# Patient Record
Sex: FEMALE | Race: BLACK OR AFRICAN AMERICAN | NOT HISPANIC OR LATINO | Employment: OTHER | URBAN - METROPOLITAN AREA
[De-identification: names, ages, dates, MRNs, and addresses within clinical notes are randomized per-mention and may not be internally consistent; named-entity substitution may affect disease eponyms.]

---

## 2017-01-03 ENCOUNTER — TELEPHONE (OUTPATIENT)
Dept: ENDOCRINOLOGY | Facility: CLINIC | Age: 67
End: 2017-01-03

## 2017-01-03 DIAGNOSIS — E05.90 HYPERTHYROIDISM: Primary | ICD-10-CM

## 2017-01-03 RX ORDER — METHIMAZOLE 10 MG/1
10 TABLET ORAL DAILY
Qty: 30 TABLET | Refills: 11 | Status: SHIPPED | OUTPATIENT
Start: 2017-01-03 | End: 2017-08-08 | Stop reason: DRUGHIGH

## 2017-01-03 NOTE — TELEPHONE ENCOUNTER
----- Message from Venu Jacob II, MD sent at 1/3/2017  3:59 PM CST -----  Contact: Robin Stein: [phone]  You saw her last week.  ----- Message -----     From: Paz Angelo LPN     Sent: 2016   3:51 PM       To: Venu Jacob II, MD    Patient has not seen you since last year in November and the medication she is requesting for refill has  so I did not want to call in the medication because did not know what dose she should be on also patient is requesting a call back from the doctor with lab results please advise   ----- Message -----     From: Lelia Higgins     Sent: 2016   2:39 PM       To: [facility] Staff    Patient is needing a refill on Methimazole.  Patient has 1 left.    Pharmacy used is [facility] [address] [phone] (Phone) [phone] (Fax).    Please call in.  Patient can be reached at [phone], patient would also like to speak with Dr. Jacob, concerns over lab work that was seen, patient stated that no one has called her.  Thanks

## 2017-01-03 NOTE — TELEPHONE ENCOUNTER
Called patient regarding labs/medication refill. Dr. Jacob left message for patient on 12/21/16 to decrease Methimazole to 10 mg daily. This was reinforced at her visit with me on 12/29/16. Refill for Methimazole sent to pharmacy per her request. Message left for her to return my call. She did not answer.

## 2017-01-04 ENCOUNTER — TELEPHONE (OUTPATIENT)
Dept: INTERNAL MEDICINE | Facility: CLINIC | Age: 67
End: 2017-01-04

## 2017-01-04 NOTE — TELEPHONE ENCOUNTER
Left message for pt requested call back regarding medicaiton concerns restarting asa 81 mg message to Dr. Sonja Wells who performed biopsy to advise if ok to restart

## 2017-01-04 NOTE — TELEPHONE ENCOUNTER
----- Message from Brissa Reese sent at 1/4/2017  8:34 AM CST -----  Contact: Self   X  1st Request  _  2nd Request  _  3rd Request        Who: LICHA CHENEY [[id number]]    Why: Pt is calling to find out when can she begin taking her low dose of Asprin 81 mg.  Pt says that she stopped taking due to a biopsy on 12/23/16. Please contact pt to further discuss and advise.    What Number to Call Back: [phone]    When to Expect a call back: (Before the end of the day)   -- if call after 3:00 call back will be tomorrow.

## 2017-01-05 NOTE — TELEPHONE ENCOUNTER
Pt has been informed of advice of ok to resume ASA per PCP but needs to await the ok from Dr. Wells's office. Pt states verbal understanding and Patient has no further questions or concerns.

## 2017-01-05 NOTE — TELEPHONE ENCOUNTER
----- Message from Shelby Powers sent at 1/4/2017  4:17 PM CST -----  Contact: pt  _  1st Request  _  2nd Request  _  3rd Request        Who: pt    Why: pt returned nurse's phone call     What Number to Call Back:[phone]    When to Expect a call back: (Before the end of the day)   -- if call after 3:00 call back will be tomorrow.

## 2017-01-25 ENCOUNTER — LAB VISIT (OUTPATIENT)
Dept: LAB | Facility: HOSPITAL | Age: 67
End: 2017-01-25
Attending: INTERNAL MEDICINE
Payer: MEDICARE

## 2017-01-25 DIAGNOSIS — E05.90 HYPERTHYROIDISM: ICD-10-CM

## 2017-01-25 LAB
T4 FREE SERPL-MCNC: 0.77 NG/DL
TSH SERPL DL<=0.005 MIU/L-ACNC: 7.26 UIU/ML

## 2017-01-25 PROCEDURE — 36415 COLL VENOUS BLD VENIPUNCTURE: CPT

## 2017-01-25 PROCEDURE — 84439 ASSAY OF FREE THYROXINE: CPT

## 2017-01-25 PROCEDURE — 84443 ASSAY THYROID STIM HORMONE: CPT

## 2017-02-17 RX ORDER — ENALAPRIL MALEATE 20 MG/1
TABLET ORAL
Qty: 90 TABLET | Refills: 3 | Status: SHIPPED | OUTPATIENT
Start: 2017-02-17 | End: 2018-02-12 | Stop reason: SDUPTHER

## 2017-03-23 ENCOUNTER — LAB VISIT (OUTPATIENT)
Dept: LAB | Facility: OTHER | Age: 67
End: 2017-03-23
Attending: NURSE PRACTITIONER
Payer: MEDICARE

## 2017-03-23 DIAGNOSIS — E10.42 TYPE 1 DIABETES MELLITUS WITH DIABETIC POLYNEUROPATHY: ICD-10-CM

## 2017-03-23 PROCEDURE — 83036 HEMOGLOBIN GLYCOSYLATED A1C: CPT

## 2017-03-23 PROCEDURE — 36415 COLL VENOUS BLD VENIPUNCTURE: CPT

## 2017-03-23 PROCEDURE — 82652 VIT D 1 25-DIHYDROXY: CPT

## 2017-03-24 LAB
ESTIMATED AVG GLUCOSE: 217 MG/DL
HBA1C MFR BLD HPLC: 9.2 %

## 2017-03-27 LAB — 1,25(OH)2D3 SERPL-MCNC: 53 PG/ML

## 2017-03-28 ENCOUNTER — TELEPHONE (OUTPATIENT)
Dept: ENDOCRINOLOGY | Facility: CLINIC | Age: 67
End: 2017-03-28

## 2017-03-28 ENCOUNTER — OFFICE VISIT (OUTPATIENT)
Dept: ENDOCRINOLOGY | Facility: CLINIC | Age: 67
End: 2017-03-28
Payer: MEDICARE

## 2017-03-28 VITALS
HEART RATE: 62 BPM | HEIGHT: 64 IN | WEIGHT: 116 LBS | SYSTOLIC BLOOD PRESSURE: 120 MMHG | DIASTOLIC BLOOD PRESSURE: 76 MMHG | BODY MASS INDEX: 19.81 KG/M2

## 2017-03-28 DIAGNOSIS — M81.0 OSTEOPOROSIS, POST-MENOPAUSAL: Chronic | ICD-10-CM

## 2017-03-28 DIAGNOSIS — E78.2 MIXED HYPERLIPIDEMIA: Chronic | ICD-10-CM

## 2017-03-28 DIAGNOSIS — E10.40 TYPE 1 DIABETES MELLITUS WITH DIABETIC NEUROPATHY: ICD-10-CM

## 2017-03-28 DIAGNOSIS — I10 ESSENTIAL HYPERTENSION: Chronic | ICD-10-CM

## 2017-03-28 DIAGNOSIS — E05.90 HYPERTHYROIDISM: ICD-10-CM

## 2017-03-28 DIAGNOSIS — E10.42 TYPE 1 DIABETES MELLITUS WITH DIABETIC POLYNEUROPATHY: Primary | ICD-10-CM

## 2017-03-28 PROCEDURE — 99999 PR PBB SHADOW E&M-EST. PATIENT-LVL III: CPT | Mod: PBBFAC,,, | Performed by: NURSE PRACTITIONER

## 2017-03-28 PROCEDURE — 1157F ADVNC CARE PLAN IN RCRD: CPT | Mod: S$GLB,,, | Performed by: NURSE PRACTITIONER

## 2017-03-28 PROCEDURE — 3078F DIAST BP <80 MM HG: CPT | Mod: S$GLB,,, | Performed by: NURSE PRACTITIONER

## 2017-03-28 PROCEDURE — 4010F ACE/ARB THERAPY RXD/TAKEN: CPT | Mod: S$GLB,,, | Performed by: NURSE PRACTITIONER

## 2017-03-28 PROCEDURE — 3046F HEMOGLOBIN A1C LEVEL >9.0%: CPT | Mod: S$GLB,,, | Performed by: NURSE PRACTITIONER

## 2017-03-28 PROCEDURE — 1159F MED LIST DOCD IN RCRD: CPT | Mod: S$GLB,,, | Performed by: NURSE PRACTITIONER

## 2017-03-28 PROCEDURE — 99214 OFFICE O/P EST MOD 30 MIN: CPT | Mod: S$GLB,,, | Performed by: NURSE PRACTITIONER

## 2017-03-28 PROCEDURE — 1160F RVW MEDS BY RX/DR IN RCRD: CPT | Mod: S$GLB,,, | Performed by: NURSE PRACTITIONER

## 2017-03-28 PROCEDURE — 1126F AMNT PAIN NOTED NONE PRSNT: CPT | Mod: S$GLB,,, | Performed by: NURSE PRACTITIONER

## 2017-03-28 PROCEDURE — 3074F SYST BP LT 130 MM HG: CPT | Mod: S$GLB,,, | Performed by: NURSE PRACTITIONER

## 2017-03-28 RX ORDER — PANTOPRAZOLE SODIUM 40 MG/1
40 TABLET, DELAYED RELEASE ORAL DAILY PRN
COMMUNITY
Start: 2017-02-26 | End: 2018-04-05 | Stop reason: SDUPTHER

## 2017-03-28 RX ORDER — INSULIN GLARGINE 100 [IU]/ML
20 INJECTION, SOLUTION SUBCUTANEOUS DAILY
Qty: 2 BOX | Refills: 3
Start: 2017-03-28 | End: 2017-10-24 | Stop reason: SDUPTHER

## 2017-03-28 NOTE — PROGRESS NOTES
CC: The primary encounter diagnosis was Type 1 diabetes mellitus with diabetic polyneuropathy. Diagnoses of Essential hypertension, Mixed hyperlipidemia, Hyperthyroidism, Osteoporosis, post-menopausal, and Type 1 diabetes mellitus with diabetic neuropathy were also pertinent to this visit.     HPI: Ms. Sarita Alvarez is a 67 y.o. Black or  female who was diagnosed with gestational DM at age 42. In 1997, she was diagnosed with T2DM, however workup revealed NILAY.     Last seen in clinic in December 2016. She had a breast biopsy in December 2016, which was benign.   Her house was affected by the [place] in [facility] this year. She is currently staying with family while her house is repaired.      Arrives alone. She brought her Relion Meter with her today. The date and time are incorrect in the meter. BG is 254 today in clinic.   Noted variability in BG readings low of 74 and high of 432. BG mostly in the 160's-200's.   14 day average- 194  30 day average-200    Monitoring readings frequently, she reports 8-10x/day. She reports that she is concerned about her BG reading being high or low. She doesn't eat anything unless her BG is <180. She reports if her BG is in the 140-150 's she tries to eat bc otherwise her BG will start to drop.    FBG- ~100-160's      Counts carbs, but does not also follow the carb ratios prescribed. She is taking 3-5 units of Novolog with meals. She reports that if she takes the Novolog prescribed with the CHO ratio, her BG readings will drop too fast because she is too active. She will only take the Novolog with meals if her BG is >150.     No interest in insulin pumps. She may be interested in a personal CGM- such as a Dexcom.      No exercise.     She admits to dietary indiscretions. Eats 3 meals per day. Denies snacking. Drinks coffee in the AM with 1 sugar packet and creamer.     CURRENT DIABETIC MEDS: Lantus 20 units QAM, Novolog 1:13 I:C ratio (3-5 units with  "Novolog), goal 100, ISF 45  Uses Vials or Pens: Pens  Type of Glucose Meter: Relion Meter    Last Eye Exam: April 2016  Last Podiatry Exam: None    REVIEW OF SYSTEMS  General: no weight changes, fatigue, or fever.   Eyes: denies blurry vision, eye pain, or redness.  Cardiac: no palpitations or chest pain.   Respiratory: no cough or dyspnea.  GI: good appetite, no nausea or abdominal pain.   Skin: no rashes; occasional bruising at insulin injection sites.   Neuro: no numbness, dizziness, or tingling.    Vital Signs  /76  Pulse 62  Ht 5' 4" (1.626 m)  Wt 52.6 kg (116 lb)  BMI 19.91 kg/m2    Hemoglobin A1C   Date Value Ref Range Status   03/23/2017 9.2 (H) 4.5 - 6.2 % Final     Comment:     According to ADA guidelines, hemoglobin A1C <7.0% represents  optimal control in non-pregnant diabetic patients.  Different  metrics may apply to specific populations.   Standards of Medical Care in Diabetes - 2016.  For the purpose of screening for the presence of diabetes:  <5.7%     Consistent with the absence of diabetes  5.7-6.4%  Consistent with increasing risk for diabetes   (prediabetes)  >or=6.5%  Consistent with diabetes  Currently no consensus exists for use of hemoglobin A1C  for diagnosis of diabetes for children.     12/21/2016 9.1 (H) 4.5 - 6.2 % Final     Comment:     According to ADA guidelines, hemoglobin A1C <7.0% represents  optimal control in non-pregnant diabetic patients.  Different  metrics may apply to specific populations.   Standards of Medical Care in Diabetes - 2016.  For the purpose of screening for the presence of diabetes:  <5.7%     Consistent with the absence of diabetes  5.7-6.4%  Consistent with increasing risk for diabetes   (prediabetes)  >or=6.5%  Consistent with diabetes  Currently no consensus exists for use of hemoglobin A1C  for diagnosis of diabetes for children.     09/22/2016 9.4 (H) 4.5 - 6.2 % Final     Comment:     According to ADA guidelines, hemoglobin A1C <7.0% " represents  optimal control in non-pregnant diabetic patients.  Different  metrics may apply to specific populations.   Standards of Medical Care in Diabetes - 2016.  For the purpose of screening for the presence of diabetes:  <5.7%     Consistent with the absence of diabetes  5.7-6.4%  Consistent with increasing risk for diabetes   (prediabetes)  >or=6.5%  Consistent with diabetes  Currently no consensus exists for use of hemoglobin A1C  for diagnosis of diabetes for children.       Lab Results   Component Value Date    CREATININE 1.2 12/21/2016      Lab Results   Component Value Date    LDLCALC 86.6 12/21/2016      Lab Results   Component Value Date    TSH 7.256 (H) 01/25/2017     Lab Results   Component Value Date    MICALBCREAT 4.3 09/29/2016     Component      Latest Ref Rng 9/28/2015   Vit D, 1,25-Dihydroxy      20-79 pg/mL 21     PHYSICAL EXAMINATION  Constitutional: Appears well, no distress  Neck: Supple, trachea midline.   Respiratory: CTA, even and unlabored.  Cardiovascular: RRR, S1 and S2 with no murmurs or extra sounds; no carotid bruits.  Lymph: DP pulses  2+ bilaterally; no edema.   Skin: warm and dry; no rash or insulin reactions observed  Diabetes Foot Exam: Appropriate footwear.     Assessment/Plan  1. Diabetic peripheral neuropathy associated with type 1 diabetes mellitus:     Uncontrolled. A1c above goal.     Continue current insulin doses as documented above. Instructed to continue to take Novolog in tandem with meals and avoid waiting until her BG are high to take insulin. Encouraged to CHO count with meals and snacking and administer inuslin based on the ICR instead of set doses of 2-5 units.     Insulin safety tips reviewed.  Skip meal insulin if meal is skipped.  Do not take meal insulin any more often than every 4 hours.  Take basal insulin at the same time every day - do not increase or decrease doses based on current blood glucose reading. Use Rule of 15 for treatment of hypoglycemia.   Maintain blood glucose logs to look for patterns and make insulin adjustments.  Lower insulin dose prior to exercise or snack prior to exercise. Call between visits if BG has been running high or low on several occasions with no known reason.      Information provided on Dexcom- believe she would benefit from a personal CGMS as she is checking her BG 8-10 x/day and waking up at 0200 to check. She is afraid of hypoglycemia and will not always administer her Novolog with meals due to this fear.     Monitor readings at least 4x/day and document. Time and date changed in meter.     DM education for CHO refresher and MNT.      Reviewed DM diet, low CHO snacks, and exercise.      2. Essential hypertension: goal <140/90. BP at goal. Continue Amlodipine, Enalapril, HCTZ, and Metoprolol      3.  Hyperlipidemia: LDL goal <100. LDL at goal. Continue Simvastatin. LFTs WNL.      4.  Hyperthyroidism: Continue Tapazole, which has been decreased to 10 mg daily. Managed by Dr. Jacob, last seen in November 2015. F/u with staff for RTC with TSH.       5.  Osteoporosis: On calcium and Vitamin D supplement.        FOLLOW UP  Return in about 3 months with staff for hyperthyroidism.         Orders Placed This Encounter   Procedures    Hemoglobin A1c     Standing Status:   Future     Standing Expiration Date:   5/27/2018    TSH     Standing Status:   Future     Standing Expiration Date:   5/27/2018    Ambulatory Referral to Diabetes Education     Referral Priority:   Routine     Referral Type:   Consultation     Referral Reason:   Specialty Services Required     Requested Specialty:   Diabetes     Number of Visits Requested:   1     Pt seen in conjunction with XUAN Krueger

## 2017-03-28 NOTE — PATIENT INSTRUCTIONS
Snacks can be an important part of a balanced, healthy meal plan. They allow you to eat more frequently, feeling full and satisfied throughout the day. Also, they allow you to spread carbohydrates evenly, which may stabilize blood sugars.  Plus, snacks are enjoyable!     The amount of carbohydrate needed at snacks varies. Generally, about 15-30 grams of carbohydrate per snack is recommended.  Below you will find some tasty treats.       0-5 gm carb   Crystal Light   Vitamin Water Zero   Herbal tea, unsweetened   2 tsp peanut butter on celery   1./2 cup sugar-free jell-o   1 sugar-free popsicle   ¼ cup blueberries   8oz Blue T[place]ka unsweetened almond milk   5 baby carrots & celery sticks, cucumbers, bell peppers dipped in ¼ cup salsa, 2Tbsp light ranch dressing or 2Tbsp plain Greek yogurt   10 Goldfish crackers   ½ oz low-fat cheese or string cheese   1 closed handful of nuts, unsalted   1 Tbsp of sunflower seeds, unsalted   1 cup Smart Pop popcorn   1 whole grain brown rice cake        15 gm carb   1 small piece of fruit or ½ banana or 1/2 cup lite canned fruit   3 orin cracker squares   3 cups Smart Pop popcorn, top spray butter, Farfan lite salt or cinnamon and Truvia   5 Vanilla Wafers   ½ cup low fat, no added sugar ice cream or frozen yogurt (Blue bell, Blue Bunny, Weight Watchers, Skinny Cow)   ½ turkey, ham, or chicken sandwich   ½ c fruit with ½ c Cottage cheese   4-6 unsalted wheat crackers with 1 oz low fat cheese or 1 tbsp peanut butter    30-45 goldfish crackers (depending on flavor)    7-8 Mandaen mini brown rice cakes (caramel, apple cinnamon, chocolate)    12 Mandaen mini brown rice cakes (cheddar, bbq, ranch)    1/3 cup hummus dip with raw veg   1/2 whole wheat katerine, 1Tbsp hummus   Mini Pizza (1/2 whole wheat English muffin, low-fat  cheese, tomato sauce)   100 calorie snack pack (Oreo, Chips Ahoy, Ritz Mix, Baked Cheetos)   4-6 oz. light or Greek Style yogurt  (Chonatyi, Yoplait, Okdianelys, [facility])   ½ cup sugar-free pudding     6 in. wheat tortilla or katerine oven toasted chips (topped with spray butter flavoring, cinnamon, Truvia OR spray butter, garlic powder, chili powder)    18 BBQ Popchips (available at [employer], Whole Foods, Fresh Market)                   Hypoglycemia (Low Blood Sugar)     Fast-acting sugar includes a cup of nonfat milk.     Too little sugar (glucose) in your blood is called hypoglycemia or low blood sugar. Low blood sugar usually means anything lower than 70 mg/dL. Talk with your healthcare provider about your target range and what level is too low for you. Diabetes itself doesnt cause low blood sugar. But some of the treatments for diabetes, such as pills or insulin, may raise your risk for it. Low blood sugar may cause you to pass out or have a seizure. So always treat low blood sugar right away, but don't overeat.  Special note: Always carry a source of fast-acting sugar and a snack in case of hypoglycemia.   What you may notice  If you have low blood sugar, you may have one or more of these symptoms:  · Shakiness or dizziness  · Cold, clammy skin or sweating  · Feelings of hunger  · Headache  · Nervousness  · A hard, fast heartbeat  · Weakness  · Confusion or irritability  · Blurred vision  · Having nightmares or waking up confused or sweating  · Numbness or tingling in the lips or tongue  What you should do  Here are tips to follow if you have hypoglycemia:   · First check your blood sugar. If it is too low (out of your target range), eat or drink 15 to 20 grams of fast-acting sugar. This may be 3 to 4 glucose tablets, 4 ounces (half a cup) of fruit juice or regular (nondiet) soda, 8 ounces (1 cup) of fat-free milk, or 1 tablespoon of honey. Dont take more than this, or your blood sugar may go too high.  · Wait 15 minutes. Then recheck your blood sugar if you can.  · If your blood sugar is still too low, repeat the steps above and check your  blood sugar again. If your blood sugar still has not returned to your target range, contact your healthcare provider or seek emergency care.  · Once your blood sugar returns to target range, eat a snack or meal.  Preventing low blood sugar  Things you can do include the following:   · If your condition needs a strict treatment plan, eat your meals and snacks at the same times each day. Dont skip meals!  · If your treatment plan lets you change when you eat and what you eat, learn how to change the time and dose of your rapid-acting insulin to match this.   · Ask your healthcare provider if it is safe for you to drink alcohol. Never drink on an empty stomach.  · Take your medicine at the prescribed times.  · Always carry a source of fast-acting sugar and a snack when youre away from home.  Other things to do  Additional tips include the following:  · Carry a medical ID card, a compact USB drive, or wear a medical alert bracelet or necklace. It should say that you have diabetes. It should also say what to do if you pass out or have a seizure.  · Make sure your family, friends, and coworkers know the signs of low blood sugar. Tell them what to do if your blood sugar falls very low and you cant treat yourself.  · Keep a glucagon emergency kit handy. Be sure your family, friends, and coworkers know how and when to use it. Check it regularly and replace the glucagon before it expires.  · Talk with your health care team about other things you can do to prevent low blood sugar.     If you have unexplained hypoglycemia or hypoglycemia several times, call your healthcare provider.   Date Last Reviewed: 5/1/2016 © 2000-2016 [employer]. [address] [place] [address], [place] [address]. All rights reserved. This information is not intended as a substitute for professional medical care. Always follow your healthcare professional's instructions.        Understanding Carbohydrates, Fats, and Protein  Food is a source of  fuel and nourishment for your body. Its also a source of pleasure. Having diabetes doesnt mean you have to eat special foods or give up desserts. Instead, your dietitian can show you how to plan meals to suit your body. To start, learn how different foods affect blood sugar.  Carbohydrates  Carbohydrates are the main source of fuel for the body. Carbohydrates raise blood sugar. Many people think carbohydrates are only found in pasta or bread. But carbohydrates are actually in many kinds of foods:  · Sugars occur naturally in foods such as fruit, milk, honey, and molasses. Sugars can also be added to many foods, from cereals and yogurt to candy and desserts. Sugars raise blood sugar.  · Starches are found in bread, cereals, pasta, and dried beans. Theyre also found in corn, peas, potatoes, yam, acorn squash, and butternut squash. Starches also raise blood sugar.   · Fiber is found in foods such as vegetables, fruits, beans, and whole grains. Unlike other carbs, fiber isnt digested or absorbed. So it doesnt raise blood sugar. In fact, fiber can help keep blood sugar from rising too fast. It also helps keep blood cholesterol at a healthy level.  Did you know?  Even though carbohydrates raise blood sugar, its best to have some in every meal. They are an important part of a healthy diet.   Fat  Fat is an energy source that can be stored until needed. Fat does not raise blood sugar. However, it can raise blood cholesterol, increasing the risk of heart disease. Fat is also high in calories, which can cause weight gain. Not all types of fat are the same.  More Healthy:  · Monounsaturated fats are mostly found in vegetable oils, such as olive, canola, and peanut oils. They are also found in avocados and some nuts. Monounsaturated fats are healthy for your heart. Thats because they lower LDL (unhealthy) cholesterol.  · Polyunsaturated fats are mostly found in vegetable oils, such as corn, safflower, and soybean oils.  They are also found in some seeds, nuts, and fish. Polyunsaturated fats lower LDL (unhealthy) cholesterol. So, choosing them instead of saturated fats is healthy for your heart. Certain unsaturated fats can help lower triglycerides.   Less Healthy:  · Saturated fats are found in animal products, such as meat, poultry, whole milk, lard, and butter. Saturated fats raise LDL cholesterol and are not healthy for your heart.  · Hydrogenated oils and trans fats are formed when vegetable oils are processed into solid fats. They are found in many processed foods. Hydrogenated oils and trans fats raise LDL cholesterol and lower HDL (healthy) cholesterol. They are not healthy for your heart.  Protein  Protein helps the body build and repair muscle and other tissue. Protein has little or no effect on blood sugar. However, many foods that contain protein also contain saturated fat. By choosing low-fat protein sources, you can get the benefits of protein without the extra fat:  · Plant protein is found in dry beans and peas, nuts, and soy products, such as tofu and soymilk. These sources tend to be cholesterol-free and low in saturated fat.  · Animal protein is found in fish, poultry, meat, cheese, milk, and eggs. These contain cholesterol and can be high in saturated fat. Aim for lean, lower-fat choices.  Date Last Reviewed: 3/1/2016  © 7234-0530 [employer]. [address] [place] [address], [place] [address]. All rights reserved. This information is not intended as a substitute for professional medical care. Always follow your healthcare professional's instructions.        Diet: Diabetes  Food is an important tool that you can use to control diabetes and stay healthy. Eating well-balanced meals in the correct amounts will help you control your blood glucose levels and prevent low blood sugar reactions. It will also help you reduce the health risks of diabetes. There is no one specific diet that is right for everyone with  diabetes. But there are general guidelines to follow. A registered dietitian (RD) will create a tailored diet approach thats just right for you. He or she will also help you plan healthy meals and snacks. If you have any questions, call your dietitian for advice.    Guidelines for success  Talk with your healthcare provider before starting a diabetes diet or weight loss program. If you haven't talked with a dietitian yet, ask your provider for a referral. The following guidelines can help you succeed:  · Select foods from the 6 food groups below. Your dietitian will help you find food choices within each group. He or she will also show you serving sizes and how many servings you can have at each meal.  ¨ Grains, beans, and starchy vegetables  ¨ Vegetables  ¨ Fruit  ¨ Milk or yogurt  ¨ Meat, poultry, fish, or tofu  ¨ Healthy fats  · Check your blood sugar levels as directed by your provider. Take any medicine as prescribed by your provider.  · Learn to read food labels and pick the right portion sizes.  · Eat only the amount of food in your meal plan. Eat about the same amount of food at regular times each day. Dont skip meals. Eat meals 4 to 5 hours apart, with snacks in between.  · Limit alcohol. It raises blood sugar levels. Drink water or calorie-free diet drinks that use safe sweeteners.  · Eat less fat to help lower your risk of heart disease. Use nonfat or low-fat dairy products and lean meats. Avoid fried foods. Use cooking oils that are unsaturated, such as olive, canola, or peanut oil.  · Talk with your dietitian about safe sugar substitutes.  · Avoid added salt. It can contribute to high blood pressure, which can cause heart disease. People with diabetes already have a risk of high blood pressure and heart disease.  · Stay at a healthy weight. If you need to lose weight, cut down on your portion sizes. But dont skip meals. Exercise is an important part of any weight management program. Talk with your  provider about an exercise program thats right for you.  · For more information about the best diet plan for you, talk with a registered dietitian (RD). To find an RD in your area, contact:  ¨ Academy of Nutrition and Dietetics www.eatright.org  ¨ The American Diabetes Association 012-651-9882 www.diabetes.org  Date Last Reviewed: 8/1/2016  © 0649-8213 [employer]. [address] [place] [address], [place] [address]. All rights reserved. This information is not intended as a substitute for professional medical care. Always follow your healthcare professional's instructions.        Eating Out When You Have Diabetes  Eating right is an important part of keeping your blood sugar in your target range. You just need to make healthy choices.    Tips for restaurant meals  When you eat away from home try these tips:  · Try to schedule your dining-out meal at your normal meal time. Make a reservation if possible, so you don't have to wait to eat. If you can't make a reservation, try to arrive at the restaurant at a less-busy time to cut down your wait time. Eat a small fruit or starch snack at your regular mealtime if your restaurant meal is going to be later than usual.   · Call ahead to see if the restaurant can meet your dietary needs if you've never been there before. Or you can go online to see the menu ahead of time.  · Carry some crackers with you in case the restaurant needs you to wait until you can be served.  · Ask how foods are prepared before you order.  · Instead of fried, sautéed, or breaded foods, choose ones that are broiled, steamed, grilled, or baked.  · Ask for sauces, gravies, and dressings on the side.  · Only eat an amount that fits your meal plan. Remember: You can take home the leftovers.  · Save dessert for special occasions. Then choose a small dessert or share one with a friend or family member.  Make healthy choices  Fast food  · Garden salad with light dressing on the side  · Baked potato  with vegetables or herbs  · Broiled, roasted, or grilled chicken sandwich  · Sliced turkey or lean roast beef sandwich  Mexican  · Chicken enchilada, without cheese or sour cream   · Small burrito with whole beans and chicken  · Whole beans (not refried) and rice  · Chicken or fish fajitas  Steakhouse  · Grilled or broiled lean cuts of beef  · Baked potato with vegetables or herbs  · Broiled or baked chicken. Dont eat the skin.  · Steamed vegetables  Asian  · Steamed dumplings or potstickers  · Broiled, boiled, or steamed meats or fish  · Sushi or sashimi  · Steamed rice or boiled noodles. One serving is equal to 1/3 cup.  Date Last Reviewed: 6/1/2016  © 1961-5435 [email or web address]. [address] [place] [address]. All rights reserved. This information is not intended as a substitute for professional medical care. Always follow your healthcare professional's instructions.

## 2017-04-12 RX ORDER — AMLODIPINE BESYLATE 5 MG/1
5 TABLET ORAL DAILY
Qty: 90 TABLET | Refills: 0 | Status: SHIPPED | OUTPATIENT
Start: 2017-04-12 | End: 2018-01-07 | Stop reason: SDUPTHER

## 2017-04-12 NOTE — TELEPHONE ENCOUNTER
----- Message from Brissa Reese sent at 4/12/2017 10:57 AM CDT -----  Contact: Self  X  1st Request  _  2nd Request  _  3rd Request    Please refill the medication(s) listed below. Please call the patient when the prescription(s) is ready for  at the phone number ([phone]) .    Medication #1 amlodipine (NORVASC) 5 MG tablet    Preferred Pharmacy:  [facility] [address] [place] [address] [address]

## 2017-04-20 ENCOUNTER — OFFICE VISIT (OUTPATIENT)
Dept: INTERNAL MEDICINE | Facility: CLINIC | Age: 67
End: 2017-04-20
Payer: MEDICARE

## 2017-04-20 VITALS
OXYGEN SATURATION: 99 % | BODY MASS INDEX: 20.06 KG/M2 | HEIGHT: 64 IN | WEIGHT: 117.5 LBS | HEART RATE: 61 BPM | DIASTOLIC BLOOD PRESSURE: 55 MMHG | SYSTOLIC BLOOD PRESSURE: 118 MMHG

## 2017-04-20 DIAGNOSIS — I10 ESSENTIAL HYPERTENSION: Chronic | ICD-10-CM

## 2017-04-20 DIAGNOSIS — M81.0 OSTEOPOROSIS, POST-MENOPAUSAL: Chronic | ICD-10-CM

## 2017-04-20 DIAGNOSIS — E06.3 HASHIMOTO'S DISEASE: ICD-10-CM

## 2017-04-20 DIAGNOSIS — I87.2 VENOUS INSUFFICIENCY (CHRONIC) (PERIPHERAL): Chronic | ICD-10-CM

## 2017-04-20 DIAGNOSIS — E05.90 HYPERTHYROIDISM: ICD-10-CM

## 2017-04-20 DIAGNOSIS — E10.42 TYPE 1 DIABETES MELLITUS WITH DIABETIC POLYNEUROPATHY: ICD-10-CM

## 2017-04-20 DIAGNOSIS — E78.2 MIXED HYPERLIPIDEMIA: Chronic | ICD-10-CM

## 2017-04-20 DIAGNOSIS — E10.42 DIABETIC PERIPHERAL NEUROPATHY ASSOCIATED WITH TYPE 1 DIABETES MELLITUS: Primary | ICD-10-CM

## 2017-04-20 DIAGNOSIS — I47.10 SVT (SUPRAVENTRICULAR TACHYCARDIA): ICD-10-CM

## 2017-04-20 PROCEDURE — 99499 UNLISTED E&M SERVICE: CPT | Mod: S$PBB,,, | Performed by: INTERNAL MEDICINE

## 2017-04-20 PROCEDURE — 1126F AMNT PAIN NOTED NONE PRSNT: CPT | Mod: S$GLB,,, | Performed by: INTERNAL MEDICINE

## 2017-04-20 PROCEDURE — 99214 OFFICE O/P EST MOD 30 MIN: CPT | Mod: S$GLB,,, | Performed by: INTERNAL MEDICINE

## 2017-04-20 PROCEDURE — 1160F RVW MEDS BY RX/DR IN RCRD: CPT | Mod: S$GLB,,, | Performed by: INTERNAL MEDICINE

## 2017-04-20 PROCEDURE — 3074F SYST BP LT 130 MM HG: CPT | Mod: S$GLB,,, | Performed by: INTERNAL MEDICINE

## 2017-04-20 PROCEDURE — 3078F DIAST BP <80 MM HG: CPT | Mod: S$GLB,,, | Performed by: INTERNAL MEDICINE

## 2017-04-20 PROCEDURE — 99999 PR PBB SHADOW E&M-EST. PATIENT-LVL III: CPT | Mod: PBBFAC,,, | Performed by: INTERNAL MEDICINE

## 2017-04-20 PROCEDURE — 4010F ACE/ARB THERAPY RXD/TAKEN: CPT | Mod: S$GLB,,, | Performed by: INTERNAL MEDICINE

## 2017-04-20 PROCEDURE — 1159F MED LIST DOCD IN RCRD: CPT | Mod: S$GLB,,, | Performed by: INTERNAL MEDICINE

## 2017-04-20 PROCEDURE — 3046F HEMOGLOBIN A1C LEVEL >9.0%: CPT | Mod: S$GLB,,, | Performed by: INTERNAL MEDICINE

## 2017-04-20 NOTE — PROGRESS NOTES
Subjective:       Patient ID: Sarita Alvarez is a 67 y.o. female.    Chief Complaint: Medication Refill (follow up)    HPI Comments: Pt here for f/u. Pt has DM1 and is followed by endocrinology. She is on lantus 20 units and novolog 3-5 units per carb counting. Her last A1C was 9.2. She has complications of diabetes that includes neuropathy in feet. This is tolerable without meds. No recent lows. Her fasting sugars are 130s-140s and prandial sugars are under 150. Most recent labs reviewed. She reports skipping insulin at times if sugar is on low side. She also waits until after eating to give insulin. We reviewed proper use of insulin. She has DM educ scheduled in 3 wks.     She has varicose veins that cause some swelling at times but manages with compression hose. Has seen vascular in past.     She also sees endocrinology for hyperthyroidism/hashimotos. She is on tapazole and monitored regularly. She is clinically euthyroid.     Pt's BP is well controlled. Tolerating meds well. Pt denies cp/sob/ha/vision or neuro changes. Checking at home and is well controlled.     HLD is tx with simvastatin which she tolerates well.     Pt has osteoporosis. She has not tolerated actonel due to CP in past, presumably esophagitis. She is not willing to try another bisphosphonate either oral or IV. We discussed prolia but she declines. She understands r/b of this. She is engaged in resistance exercises.      Pt had symptomatic SVT s/p ablation and has not had recurrence.      Medication Refill   Pertinent negatives include no abdominal pain, chest pain, fatigue, headaches or numbness.   Hypertension   Pertinent negatives include no chest pain, headaches or shortness of breath.     Review of Systems   Constitutional: Negative for fatigue and unexpected weight change.   Eyes: Negative for visual disturbance.   Respiratory: Negative for shortness of breath.    Cardiovascular: Negative for chest pain.   Gastrointestinal: Negative  for abdominal pain and blood in stool.   Endocrine: Negative for polyuria.   Genitourinary: Negative for frequency.   Neurological: Negative for dizziness, light-headedness, numbness and headaches.   Psychiatric/Behavioral: Negative for dysphoric mood.       Objective:          Assessment:       1. Diabetic peripheral neuropathy associated with type 1 diabetes mellitus    2. Type 1 diabetes mellitus with diabetic polyneuropathy    3. Essential hypertension    4. SVT (supraventricular tachycardia)    5. Venous insufficiency (chronic) (peripheral)    6. Hashimoto's disease    7. Hyperthyroidism    8. Mixed hyperlipidemia    9. Osteoporosis, post-menopausal        Plan:       1. Reviewed recent labs    2. Keep f/u with endocrinology and DM educ; keep close log of sugars  3. Pt to consider reclast or prolia for osteoporosis  4. Keep f/u with specialists  5. Optometry referral          Physical Exam   Constitutional: She is oriented to person, place, and time. She appears well-developed and well-nourished.   HENT:   Head: Normocephalic and atraumatic.   Mouth/Throat: No oropharyngeal exudate or posterior oropharyngeal erythema.   Eyes: EOM are normal. Pupils are equal, round, and reactive to light.   Neck: Neck supple. Carotid bruit is not present. No thyroid mass and no thyromegaly present.   Cardiovascular: Normal rate, regular rhythm, S1 normal, S2 normal and normal heart sounds.    No murmur heard.  Pulses:       Radial pulses are 2+ on the right side, and 2+ on the left side.   Pulmonary/Chest: Effort normal and breath sounds normal. She has no wheezes.   Abdominal: Soft. Bowel sounds are normal. She exhibits no distension and no mass. There is no hepatosplenomegaly. There is no tenderness.   Musculoskeletal: She exhibits no edema.        Right foot: Normal.        Left foot: Normal.   Lymphadenopathy:     She has no cervical adenopathy.   Neurological: She is alert and oriented to person, place, and time. No  cranial nerve deficit.   Psychiatric: She has a normal mood and affect. Her behavior is normal.

## 2017-05-02 RX ORDER — HYDROCHLOROTHIAZIDE 25 MG/1
TABLET ORAL
Qty: 90 TABLET | Refills: 3 | Status: SHIPPED | OUTPATIENT
Start: 2017-05-02 | End: 2018-04-28 | Stop reason: SDUPTHER

## 2017-05-09 ENCOUNTER — CLINICAL SUPPORT (OUTPATIENT)
Dept: DIABETES | Facility: CLINIC | Age: 67
End: 2017-05-09
Payer: MEDICARE

## 2017-05-09 DIAGNOSIS — E10.42 TYPE 1 DIABETES MELLITUS WITH DIABETIC POLYNEUROPATHY: ICD-10-CM

## 2017-05-09 PROCEDURE — G0108 DIAB MANAGE TRN  PER INDIV: HCPCS | Mod: S$GLB,,, | Performed by: DIETITIAN, REGISTERED

## 2017-05-09 NOTE — Clinical Note
See chart note for full details, but basically I gave her an ICR of 1:100 to use when she will be active following a meal because that is basically what she is doing by just not giving as much as she is calculating. If this is not okay and you want me to contact the pt I will.
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
## 2017-07-31 RX ORDER — FLUTICASONE PROPIONATE 50 MCG
SPRAY, SUSPENSION (ML) NASAL
Qty: 16 G | Refills: 1 | Status: SHIPPED | OUTPATIENT
Start: 2017-07-31 | End: 2019-02-12 | Stop reason: SDUPTHER

## 2017-08-03 ENCOUNTER — TELEPHONE (OUTPATIENT)
Dept: INTERNAL MEDICINE | Facility: CLINIC | Age: 67
End: 2017-08-03

## 2017-08-04 ENCOUNTER — LAB VISIT (OUTPATIENT)
Dept: LAB | Facility: OTHER | Age: 67
End: 2017-08-04
Attending: INTERNAL MEDICINE
Payer: MEDICARE

## 2017-08-04 ENCOUNTER — OFFICE VISIT (OUTPATIENT)
Dept: INTERNAL MEDICINE | Facility: CLINIC | Age: 67
End: 2017-08-04
Payer: MEDICARE

## 2017-08-04 VITALS
WEIGHT: 117.31 LBS | BODY MASS INDEX: 20.03 KG/M2 | OXYGEN SATURATION: 97 % | SYSTOLIC BLOOD PRESSURE: 110 MMHG | HEIGHT: 64 IN | HEART RATE: 55 BPM | DIASTOLIC BLOOD PRESSURE: 70 MMHG

## 2017-08-04 DIAGNOSIS — I47.10 SVT (SUPRAVENTRICULAR TACHYCARDIA): ICD-10-CM

## 2017-08-04 DIAGNOSIS — E10.42 TYPE 1 DIABETES MELLITUS WITH DIABETIC POLYNEUROPATHY: ICD-10-CM

## 2017-08-04 DIAGNOSIS — E10.42 DIABETIC PERIPHERAL NEUROPATHY ASSOCIATED WITH TYPE 1 DIABETES MELLITUS: ICD-10-CM

## 2017-08-04 DIAGNOSIS — I10 ESSENTIAL HYPERTENSION: Primary | Chronic | ICD-10-CM

## 2017-08-04 DIAGNOSIS — E05.90 HYPERTHYROIDISM: ICD-10-CM

## 2017-08-04 DIAGNOSIS — E06.3 HASHIMOTO'S DISEASE: ICD-10-CM

## 2017-08-04 DIAGNOSIS — E78.2 MIXED HYPERLIPIDEMIA: Chronic | ICD-10-CM

## 2017-08-04 DIAGNOSIS — I10 ESSENTIAL HYPERTENSION: Chronic | ICD-10-CM

## 2017-08-04 DIAGNOSIS — I87.2 VENOUS INSUFFICIENCY (CHRONIC) (PERIPHERAL): Chronic | ICD-10-CM

## 2017-08-04 LAB
ALBUMIN SERPL BCP-MCNC: 3.8 G/DL
ALP SERPL-CCNC: 77 U/L
ALT SERPL W/O P-5'-P-CCNC: 22 U/L
ANION GAP SERPL CALC-SCNC: 10 MMOL/L
AST SERPL-CCNC: 21 U/L
BILIRUB SERPL-MCNC: 0.3 MG/DL
BUN SERPL-MCNC: 19 MG/DL
CALCIUM SERPL-MCNC: 9.8 MG/DL
CHLORIDE SERPL-SCNC: 101 MMOL/L
CO2 SERPL-SCNC: 27 MMOL/L
CREAT SERPL-MCNC: 1.1 MG/DL
EST. GFR  (AFRICAN AMERICAN): >60 ML/MIN/1.73 M^2
EST. GFR  (NON AFRICAN AMERICAN): 52 ML/MIN/1.73 M^2
ESTIMATED AVG GLUCOSE: 186 MG/DL
GLUCOSE SERPL-MCNC: 242 MG/DL
HBA1C MFR BLD HPLC: 8.1 %
POTASSIUM SERPL-SCNC: 3.7 MMOL/L
PROT SERPL-MCNC: 7.3 G/DL
SODIUM SERPL-SCNC: 138 MMOL/L
T4 FREE SERPL-MCNC: 0.46 NG/DL
TSH SERPL DL<=0.005 MIU/L-ACNC: 14.58 UIU/ML

## 2017-08-04 PROCEDURE — 1159F MED LIST DOCD IN RCRD: CPT | Mod: S$GLB,,, | Performed by: INTERNAL MEDICINE

## 2017-08-04 PROCEDURE — 84439 ASSAY OF FREE THYROXINE: CPT

## 2017-08-04 PROCEDURE — 90670 PCV13 VACCINE IM: CPT | Mod: S$GLB,,, | Performed by: INTERNAL MEDICINE

## 2017-08-04 PROCEDURE — 83036 HEMOGLOBIN GLYCOSYLATED A1C: CPT

## 2017-08-04 PROCEDURE — 84443 ASSAY THYROID STIM HORMONE: CPT

## 2017-08-04 PROCEDURE — 99214 OFFICE O/P EST MOD 30 MIN: CPT | Mod: 25,S$GLB,, | Performed by: INTERNAL MEDICINE

## 2017-08-04 PROCEDURE — 99499 UNLISTED E&M SERVICE: CPT | Mod: S$PBB,,, | Performed by: INTERNAL MEDICINE

## 2017-08-04 PROCEDURE — 4010F ACE/ARB THERAPY RXD/TAKEN: CPT | Mod: S$GLB,,, | Performed by: INTERNAL MEDICINE

## 2017-08-04 PROCEDURE — 80053 COMPREHEN METABOLIC PANEL: CPT

## 2017-08-04 PROCEDURE — 36415 COLL VENOUS BLD VENIPUNCTURE: CPT

## 2017-08-04 PROCEDURE — 99999 PR PBB SHADOW E&M-EST. PATIENT-LVL III: CPT | Mod: PBBFAC,,, | Performed by: INTERNAL MEDICINE

## 2017-08-04 PROCEDURE — G0009 ADMIN PNEUMOCOCCAL VACCINE: HCPCS | Mod: S$GLB,,, | Performed by: INTERNAL MEDICINE

## 2017-08-04 PROCEDURE — 1126F AMNT PAIN NOTED NONE PRSNT: CPT | Mod: S$GLB,,, | Performed by: INTERNAL MEDICINE

## 2017-08-04 PROCEDURE — 3046F HEMOGLOBIN A1C LEVEL >9.0%: CPT | Mod: S$GLB,,, | Performed by: INTERNAL MEDICINE

## 2017-08-04 NOTE — PROGRESS NOTES
Patient given Prevnar 13 IM in the LD. Patient tolerated well and Band-Aid was applied. Lot#[id number] Exp:5/2018. Patient advised to wait in the lobby for 15 min to make sure no adverse reactions occur. Patient states verbal understanding and has no further questions. Patient given VIS information sheet.

## 2017-08-04 NOTE — PROGRESS NOTES
Subjective:       Patient ID: Sarita Alvarez is a 67 y.o. female.    Chief Complaint: Facial Swelling; Facial Pain; and Otalgia    Pt here for f/u. She c/o 2 wks of R ear fullness and nasal congestion. No visible swelling but felt like face was swollen. Just started flonase 2 days ago but no relief. No cough/sore throat/fever. Minimal rhinorrhea. She feels, however, that this is related to past allergy issues.     Pt has DM1 and is followed by endocrinology. She is on lantus 20 units and novolog 3-5 units per carb counting. Her last A1C was 9.2. She has complications of diabetes that includes neuropathy in feet. This is tolerable without meds. No recent lows. Her fasting sugars are 130s-140s and prandial sugars are under 150. She reports skipping insulin at times if sugar is on low side. She also waits until after eating to give insulin. We reviewed proper use of insulin. She is overdue for f/u with endocrinology.    She has varicose veins that cause some swelling at times but manages with compression hose. Has seen vascular in past.     She also sees endocrinology for hyperthyroidism/hashimotos. She is on tapazole and monitored regularly. She is clinically euthyroid.     Pt's BP is well controlled. Tolerating meds well. Pt denies cp/sob/ha/vision or neuro changes. Checking at home and is well controlled.     HLD is tx with simvastatin which she tolerates well.     Pt has osteoporosis. She has not tolerated actonel due to CP in past, presumably esophagitis. She is not willing to try another bisphosphonate either oral or IV. We discussed prolia but she declines. She understands r/b of this. She is engaged in resistance exercises.      Pt had symptomatic SVT s/p ablation and has not had recurrence.        Medication Refill   Pertinent negatives include no abdominal pain, chest pain, fatigue, headaches or numbness.   Hypertension   Pertinent negatives include no chest pain, headaches or shortness of breath.    Facial Pain   Pertinent negatives include no abdominal pain, chest pain, fatigue, headaches or numbness.   Otalgia    Pertinent negatives include no abdominal pain or headaches.     Review of Systems   Constitutional: Negative for fatigue and unexpected weight change.   HENT: Positive for ear pain.    Eyes: Negative for visual disturbance.   Respiratory: Negative for shortness of breath.    Cardiovascular: Negative for chest pain.   Gastrointestinal: Negative for abdominal pain and blood in stool.   Endocrine: Negative for polyuria.   Genitourinary: Negative for frequency.   Neurological: Negative for dizziness, light-headedness, numbness and headaches.   Psychiatric/Behavioral: Negative for dysphoric mood.       Objective:          Assessment:       1. Essential hypertension    2. Mixed hyperlipidemia    3. Hyperthyroidism    4. Hashimoto's disease    5. Diabetic peripheral neuropathy associated with type 1 diabetes mellitus    6. SVT (supraventricular tachycardia)    7. Venous insufficiency (chronic) (peripheral)        Plan:       1. Reviewed recent labs    2. Keep f/u with endocrinology; keep close log of sugars  3. Add allegra to flonase; if symptoms persist, she will let me know  4. Keep f/u with specialists  5. Pt to schedule f/u with ophtho  6. Home BP and BS monitoring  7. prevnar-13          Physical Exam   Constitutional: She is oriented to person, place, and time. She appears well-developed and well-nourished.   HENT:   Head: Normocephalic and atraumatic.   Mouth/Throat: No oropharyngeal exudate or posterior oropharyngeal erythema.   Eyes: EOM are normal. Pupils are equal, round, and reactive to light.   Neck: Neck supple. Carotid bruit is not present. No thyroid mass and no thyromegaly present.   Cardiovascular: Normal rate, regular rhythm, S1 normal, S2 normal and normal heart sounds.    No murmur heard.  Pulses:       Radial pulses are 2+ on the right side, and 2+ on the left side.    Pulmonary/Chest: Effort normal and breath sounds normal. She has no wheezes.   Abdominal: Soft. Bowel sounds are normal. She exhibits no distension and no mass. There is no hepatosplenomegaly. There is no tenderness.   Musculoskeletal: She exhibits no edema.        Right foot: Normal.        Left foot: Normal.   Lymphadenopathy:     She has no cervical adenopathy.   Neurological: She is alert and oriented to person, place, and time. No cranial nerve deficit.   Psychiatric: She has a normal mood and affect. Her behavior is normal.

## 2017-08-07 DIAGNOSIS — E05.90 HYPERTHYROIDISM: Primary | ICD-10-CM

## 2017-08-07 NOTE — TELEPHONE ENCOUNTER
----- Message from Soo Marquez NP sent at 8/4/2017  3:45 PM CDT -----  Ita Rocha,     Can you have Dr. Jacob look at her thyroid results. I need some assistance with her hyperthyroidism. Thank you!     Sincerely,  CHRISTIANO Vizcaino

## 2017-08-08 RX ORDER — METHIMAZOLE 5 MG/1
5 TABLET ORAL DAILY
Qty: 30 TABLET | Refills: 11 | Status: SHIPPED | OUTPATIENT
Start: 2017-08-08 | End: 2017-10-10 | Stop reason: SDUPTHER

## 2017-08-08 RX ORDER — METHIMAZOLE 5 MG/1
5 TABLET ORAL DAILY
COMMUNITY
End: 2017-08-08 | Stop reason: SDUPTHER

## 2017-08-08 NOTE — TELEPHONE ENCOUNTER
Spoke with patient and she stated last time she stopped she felt really bad. Dr. Jacob said to cut it in half then. She will now take 5 mg daily. She will get labs done again in 6 weeks.

## 2017-08-10 DIAGNOSIS — I47.10 SVT (SUPRAVENTRICULAR TACHYCARDIA): ICD-10-CM

## 2017-08-10 RX ORDER — METOPROLOL SUCCINATE 25 MG/1
25 TABLET, EXTENDED RELEASE ORAL DAILY
Qty: 90 TABLET | Refills: 3 | Status: SHIPPED | OUTPATIENT
Start: 2017-08-10 | End: 2018-08-06 | Stop reason: SDUPTHER

## 2017-08-10 NOTE — TELEPHONE ENCOUNTER
----- Message from Margi Paredes sent at 8/10/2017  1:02 PM CDT -----  Contact: LICHA CHENEY [[id number]]  x_  1st Request  _  2nd Request  _  3rd Request    Please refill the medication(s) listed below.     Medication #1 metoprolol succinate (TOPROL-XL) 25 MG 24 hr tablet    Preferred Pharmacy:  [address] [address]  Phone: [phone] Fax: [phone]

## 2017-08-22 ENCOUNTER — TELEPHONE (OUTPATIENT)
Dept: INTERNAL MEDICINE | Facility: CLINIC | Age: 67
End: 2017-08-22

## 2017-08-22 NOTE — TELEPHONE ENCOUNTER
----- Message from Yris Schaefer sent at 8/22/2017 11:15 AM CDT -----  Contact: Self  X   1st Request  _  2nd Request  _  3rd Request        Who: LICHA CHENEY [[id number]]    Why: Pt states she would like her A1C results. Please call pt,thanks!    What Number to Call Back: [phone]    When to Expect a call back: (With in 24 hours)

## 2017-08-22 NOTE — TELEPHONE ENCOUNTER
Pt has been informed of results.  States verbal understanding. Patient has no further questions or concerns.

## 2017-08-25 DIAGNOSIS — E78.5 HYPERLIPIDEMIA, UNSPECIFIED HYPERLIPIDEMIA TYPE: ICD-10-CM

## 2017-08-25 RX ORDER — SIMVASTATIN 20 MG/1
TABLET, FILM COATED ORAL
Qty: 90 TABLET | Refills: 0 | Status: SHIPPED | OUTPATIENT
Start: 2017-08-25 | End: 2017-10-24 | Stop reason: SDUPTHER

## 2017-08-25 NOTE — TELEPHONE ENCOUNTER
----- Message from Suzanne Hogan sent at 8/25/2017 12:29 PM CDT -----  Contact: pt  _x  1st Request  _  2nd Request  _  3rd Request    Please refill the medication(s) listed below.     Medication #1simvastatin (ZOCOR) 20 MG tablet    Medication #2      Preferred Pharmacy:[facility] [id number]

## 2017-09-14 ENCOUNTER — HOSPITAL ENCOUNTER (OUTPATIENT)
Dept: RADIOLOGY | Facility: HOSPITAL | Age: 67
Discharge: HOME | End: 2017-09-14
Attending: OBSTETRICS & GYNECOLOGY
Payer: MEDICARE

## 2017-09-14 DIAGNOSIS — R92.8 ABNORMAL MAMMOGRAM: ICD-10-CM

## 2017-09-14 PROCEDURE — 77061 BREAST TOMOSYNTHESIS UNI: CPT | Mod: 26,LT,, | Performed by: RADIOLOGY

## 2017-09-14 PROCEDURE — 77061 BREAST TOMOSYNTHESIS UNI: CPT | Mod: TC,LT

## 2017-09-14 PROCEDURE — 77065 DX MAMMO INCL CAD UNI: CPT | Mod: 26,LT,, | Performed by: RADIOLOGY

## 2017-09-19 ENCOUNTER — LAB VISIT (OUTPATIENT)
Dept: LAB | Facility: OTHER | Age: 67
End: 2017-09-19
Attending: INTERNAL MEDICINE
Payer: MEDICARE

## 2017-09-19 ENCOUNTER — OFFICE VISIT (OUTPATIENT)
Dept: INTERNAL MEDICINE | Facility: CLINIC | Age: 67
End: 2017-09-19
Payer: MEDICARE

## 2017-09-19 VITALS
SYSTOLIC BLOOD PRESSURE: 122 MMHG | OXYGEN SATURATION: 99 % | HEIGHT: 63 IN | RESPIRATION RATE: 18 BRPM | WEIGHT: 119.25 LBS | BODY MASS INDEX: 21.13 KG/M2 | HEART RATE: 60 BPM | DIASTOLIC BLOOD PRESSURE: 50 MMHG

## 2017-09-19 DIAGNOSIS — E05.90 HYPERTHYROIDISM: ICD-10-CM

## 2017-09-19 DIAGNOSIS — I10 ESSENTIAL HYPERTENSION: Chronic | ICD-10-CM

## 2017-09-19 DIAGNOSIS — E06.3 HASHIMOTO'S DISEASE: ICD-10-CM

## 2017-09-19 DIAGNOSIS — Z00.00 ENCOUNTER FOR PREVENTIVE HEALTH EXAMINATION: Primary | ICD-10-CM

## 2017-09-19 DIAGNOSIS — H25.9 AGE-RELATED CATARACT OF BOTH EYES, UNSPECIFIED AGE-RELATED CATARACT TYPE: ICD-10-CM

## 2017-09-19 DIAGNOSIS — I47.10 SVT (SUPRAVENTRICULAR TACHYCARDIA): ICD-10-CM

## 2017-09-19 DIAGNOSIS — I70.0 AORTIC ATHEROSCLEROSIS: ICD-10-CM

## 2017-09-19 DIAGNOSIS — E10.42 DIABETIC PERIPHERAL NEUROPATHY ASSOCIATED WITH TYPE 1 DIABETES MELLITUS: ICD-10-CM

## 2017-09-19 DIAGNOSIS — M81.0 OSTEOPOROSIS, POST-MENOPAUSAL: Chronic | ICD-10-CM

## 2017-09-19 DIAGNOSIS — E78.2 MIXED HYPERLIPIDEMIA: Chronic | ICD-10-CM

## 2017-09-19 LAB — TSH SERPL DL<=0.005 MIU/L-ACNC: 3.13 UIU/ML

## 2017-09-19 PROCEDURE — 99397 PER PM REEVAL EST PAT 65+ YR: CPT | Mod: S$GLB,,, | Performed by: NURSE PRACTITIONER

## 2017-09-19 PROCEDURE — 99999 PR PBB SHADOW E&M-EST. PATIENT-LVL V: CPT | Mod: PBBFAC,,, | Performed by: NURSE PRACTITIONER

## 2017-09-19 PROCEDURE — 36415 COLL VENOUS BLD VENIPUNCTURE: CPT

## 2017-09-19 PROCEDURE — 99499 UNLISTED E&M SERVICE: CPT | Mod: S$PBB,,, | Performed by: NURSE PRACTITIONER

## 2017-09-19 PROCEDURE — 84443 ASSAY THYROID STIM HORMONE: CPT

## 2017-09-19 NOTE — PROGRESS NOTES
"Sarita Alvarez presented for a  Medicare AWV and comprehensive Health Risk Assessment today. The following components were reviewed and updated:    · Medical history  · Family History  · Social history  · Allergies and Current Medications  · Health Risk Assessment  · Health Maintenance  · Care Team     ** See Completed Assessments for Annual Wellness Visit within the encounter summary.**       The following assessments were completed:  · Living Situation  · CAGE  · Depression Screening  · Timed Get Up and Go  · Whisper Test  · Cognitive Function Screening    · Nutrition Screening  · ADL Screening  · PAQ Screening    Vitals:    09/19/17 0955   BP: (!) 122/50   BP Location: Left arm   Patient Position: Sitting   BP Method: Medium (Manual)   Pulse: 60   Resp: 18   SpO2: 99%   Weight: 54.1 kg (119 lb 4.3 oz)   Height: 5' 3" (1.6 m)     Body mass index is 21.13 kg/m².  Physical Exam   Constitutional: She is oriented to person, place, and time. She appears well-developed and well-nourished.   HENT:   Head: Normocephalic.   Right Ear: External ear normal.   Left Ear: External ear normal.   Mouth/Throat: Oropharynx is clear and moist.   Eyes: Pupils are equal, round, and reactive to light.   Cardiovascular: Normal rate, regular rhythm, normal heart sounds and intact distal pulses.    Pulmonary/Chest: Effort normal and breath sounds normal.   Abdominal: Soft. Bowel sounds are normal.   Musculoskeletal: Normal range of motion.   Neurological: She is alert and oriented to person, place, and time.   Skin: Skin is warm and dry.   Vitals reviewed.        Diagnoses and health risks identified today and associated recommendations/orders:    1. Encounter for preventive health examination  Annual Health Risk Assessment (HRA) visit today.  Counseling and referral of health maintenance and preventative health measures performed.  Patient given annual wellness paperwork to take home.  Encouraged to return in 1 year for subsequent " HRA visit.  - Counseled on influenza and tetanus immunizations.  She declined both today.  Stated she is starting to get a cold/congestion and would like to wait until later.  - Eye exam: Message sent to Dr. Watters's staff to help her schedule.  - All other health maintenance measures are up to date.    2. Diabetic peripheral neuropathy associated with type 1 diabetes mellitus  Chronic.  Stable and controlled.  Monitored by PCP and Endocrinology.  She declined a foot exam today, would prefer to do it when she sees Endocrinology.       3. SVT (supraventricular tachycardia)  Chronic.  Stable and controlled.  S/p ablation.  Evaluated and monitored by Cardiology.     4. Aortic atherosclerosis  Noted on CT Renal stone study from 10/4/2011.  Chronic.  Stable.  Treated with simvastatin and aspirin.  Monitored by PCP.        5. Age-related cataract of both eyes, unspecified age-related cataract type  Chronic.  Stable and controlled. Monitored by Optometry.     6. Essential hypertension  Chronic.  Stable and controlled.  Treated with amlodipine, enalapril, HCTZ, and metoprolol.  Encouraged to continue active lifestyle, walking, and home exercises and to improve diet to heart healthy, low sodium diet.  Encouraged to continue treatment plan.  Monitored by Cardiology and PCP.      7. Mixed hyperlipidemia  Chronic.  Stable and controlled.  Treated with simvastatin.  Encouraged patient to continue treatment plan. Monitored by PCP.      8. Hyperthyroidism  Chronic.  Stable.  Treated with methimazole.  Encouraged to continue treatment plan.  Monitored by Endocrinology.  Repeat TSH done today, awaiting result.     9. Hashimoto's disease  Chronic.  Stable.   Monitored by Endocrinology.     10. Osteoporosis, post-menopausal  Noted on DXA bone density scan from 12/2016.  Chronic.  Stable.  Treated with calcium and vitamin D supplementation and resistance exercises.  Encouraged to continue treatment plan. Monitored by PCP.     Provided  Sarita WAGNER with a 5-10 year written screening schedule and personal prevention plan. Recommendations were developed using the USPSTF age appropriate recommendations. Education, counseling, and referrals were provided as needed. After Visit Summary printed and given to patient which includes a list of additional screenings\tests needed.    Return in about 1 year (around 9/19/2018) for your next Health Risk Assessment visit.    Santa Lebron NP

## 2017-09-19 NOTE — PATIENT INSTRUCTIONS
Counseling and Referral of Other Preventative  (Italic type indicates deductible and co-insurance are waived)    Patient Name: Sarita Alvarez  Today's Date: 9/19/2017      SERVICE LIMITATIONS RECOMMENDATION    Vaccines    · Pneumococcal (once after 65)    · Influenza (annually)    · Hepatitis B (if medium/high risk)    · Prevnar 13      Hepatitis B medium/high risk factors:       - End-stage renal disease       - Hemophiliacs who received Factor VII or         IX concentrates       - Clients of institutions for the mentally             retarded       - Persons who live in the same house as          a HepB carrier       - Homosexual men       - Illicit injectable drug abusers     Pneumococcal: Done, no repeat necessary     Influenza: N/A  She will call to schedule for a nurse visit, has a cold right now.     Hepatitis B: N/A     Prevnar 13: Done, no repeat necessary    Mammogram (biennial age 50-74)  Annually (age 40 or over)  Done this year, repeat every year    Pap (up to age 70 and after 70 if unknown history or abnormal study last 10 years)    Last done 11/2015, recommend to repeat every 3  years     The USPSTF recommends against screening for cervical cancer in women older than age 65 years who have had adequate prior screening and are not otherwise at high risk for cervical cancer.      Colorectal cancer screening (to age 75)    · Fecal occult blood test (annual)  · Flexible sigmoidoscopy (5y)  · Screening colonoscopy (10y)  · Barium enema   Last done 12/2016, recommend to repeat every 5-10  years    Diabetes self-management training (no USPSTF recommendations)  Requires referral by treating physician for patient with diabetes or renal disease. 10 hours of initial DSMT sessions of no less than 30 minutes each in a continuous 12-month period. 2 hours of follow-up DSMT in subsequent years.  N/A    Bone mass measurements (age 65 & older, biennial)  Requires diagnosis related to osteoporosis or estrogen  deficiency. Biennial benefit unless patient has history of long-term glucocorticoid  Last done 12/2016, recommend to repeat every 3  years    Glaucoma screening (no USPSTF recommendation)  Diabetes mellitus, family history   , age 50 or over    American, age 65 or over  N/A  Message sent for eye doc to schedule.    Medical nutrition therapy for diabetes or renal disease (no recommended schedule)  Requires referral by treating physician for patient with diabetes or renal disease or kidney transplant within the past 3 years.  Can be provided in same year as diabetes self-management training (DSMT), and CMS recommends medical nutrition therapy take place after DSMT. Up to 3 hours for initial year and 2 hours in subsequent years.  N/A    Cardiovascular screening blood tests (every 5 years)  · Fasting lipid panel  Order as a panel if possible  Last done 12/2016, recommend to repeat every 1  years    Diabetes screening tests (at least every 3 years, Medicare covers annually or at 6-month intervals for prediabetic patients)  · Fasting blood sugar (FBS) or glucose tolerance test (GTT)  Patient must be diagnosed with one of the following:       - Hypertension       - Dyslipidemia       - Obesity (BMI 30kg/m2)       - Previous elevated impaired FBS or GTT       ... or any two of the following:       - Overweight (BMI 25 but <30)       - Family history of diabetes       - Age 65 or older       - History of gestational diabetes or birth of baby weighing more than 9 pounds  N/A    HIV screening (annually for increased risk patients)  · HIV-1 and HIV-2 by EIA, or HALIE, rapid antibody test or oral mucosa transudate  Patients must be at increased risk for HIV infection per USPSTF guidelines or pregnant. Tests covered annually for patient at increased risk or as requested by the patient. Pregnant patients may receive up to 3 tests during pregnancy.  Risks discussed, screening is not recommended    Smoking  cessation counseling (up to 8 sessions per year)  Patients must be asymptomatic of tobacco-related conditions to receive as a preventative service.  Non-smoker    Subsequent annual wellness visit  At least 12 months since last AWV  Return in one year     The following information is provided to all patients.  This information is to help you find resources for any of the problems found today that may be affecting your health:                Living healthy guide: www.[facility].[place].[facility]      Understanding Diabetes: www.diabetes.org      Eating healthy: www.cdc.gov/healthyweight      [facility] home safety checklist: www.cdc.gov/steadi/patient.html      Agency on Aging: www.goea.[place].[facility]      Alcoholics anonymous (AA): www.aa.org      Physical Activity: www.stacey.nih.gov/xe8hmfe      Tobacco use: www.quitwithusla.org

## 2017-09-26 ENCOUNTER — TELEPHONE (OUTPATIENT)
Dept: ENDOCRINOLOGY | Facility: CLINIC | Age: 67
End: 2017-09-26

## 2017-09-26 ENCOUNTER — TELEPHONE (OUTPATIENT)
Dept: OPTOMETRY | Facility: CLINIC | Age: 67
End: 2017-09-26

## 2017-09-26 NOTE — TELEPHONE ENCOUNTER
Spoke to patient and let her know that they only gave her and appointment with JAZLYN Duran instead of PETERSON Gant because she wanted that date and PETERSON Gant was booked up on that day. And I told her I would be happy to schedule her today to see PETERSON Gant. She is now schedule with PETERSON Gant.

## 2017-09-26 NOTE — TELEPHONE ENCOUNTER
----- Message from Elif Watters OD sent at 9/25/2017  9:04 AM CDT -----  Regarding: RE: Follow up  Please inform Sarita Alvarez that I am no longer seeing adult patients.  Please transfer care to Dr. Jacobo  ----- Message -----  From: Rip Ellison  Sent: 9/20/2017   4:36 PM  To: Elif Watters OD  Subject: FW: Follow up                                    Let me know  ----- Message -----  From: Santa Lebron NP  Sent: 9/19/2017  10:40 AM  To: Duong London  Subject: Follow up                                        Good morning!    I saw Ms. Alvarez for a Health Risk Assessment visit today, and she wanted me to reach out to you to schedule a follow up visit.  Would you please contact her to schedule?      Gratefully,  Santa Lebron NP

## 2017-09-26 NOTE — TELEPHONE ENCOUNTER
----- Message from Ivette Sky sent at 9/26/2017  1:41 PM CDT -----  Contact: Self [phone]  PT is requesting more information in regards to her appointment on tomorrow. She wants to know if PETERSON Gant no longer wants to see her.

## 2017-09-28 ENCOUNTER — OFFICE VISIT (OUTPATIENT)
Dept: CARDIOLOGY | Facility: CLINIC | Age: 67
End: 2017-09-28
Payer: MEDICARE

## 2017-09-28 VITALS
BODY MASS INDEX: 20.25 KG/M2 | HEART RATE: 63 BPM | SYSTOLIC BLOOD PRESSURE: 141 MMHG | WEIGHT: 118.63 LBS | HEIGHT: 64 IN | DIASTOLIC BLOOD PRESSURE: 66 MMHG

## 2017-09-28 DIAGNOSIS — E78.2 MIXED HYPERLIPIDEMIA: Chronic | ICD-10-CM

## 2017-09-28 DIAGNOSIS — I10 ESSENTIAL HYPERTENSION: Primary | Chronic | ICD-10-CM

## 2017-09-28 DIAGNOSIS — E10.42 DIABETIC PERIPHERAL NEUROPATHY ASSOCIATED WITH TYPE 1 DIABETES MELLITUS: ICD-10-CM

## 2017-09-28 DIAGNOSIS — E05.90 HYPERTHYROIDISM: ICD-10-CM

## 2017-09-28 DIAGNOSIS — E06.3 HASHIMOTO'S DISEASE: ICD-10-CM

## 2017-09-28 DIAGNOSIS — Z98.890 STATUS POST CATHETER ABLATION OF SLOW PATHWAY: ICD-10-CM

## 2017-09-28 DIAGNOSIS — Z86.79 STATUS POST CATHETER ABLATION OF SLOW PATHWAY: ICD-10-CM

## 2017-09-28 PROCEDURE — 99999 PR PBB SHADOW E&M-EST. PATIENT-LVL III: CPT | Mod: PBBFAC,,, | Performed by: INTERNAL MEDICINE

## 2017-09-28 PROCEDURE — 99499 UNLISTED E&M SERVICE: CPT | Mod: S$PBB,,, | Performed by: INTERNAL MEDICINE

## 2017-09-28 PROCEDURE — 3078F DIAST BP <80 MM HG: CPT | Mod: S$GLB,,, | Performed by: INTERNAL MEDICINE

## 2017-09-28 PROCEDURE — 1159F MED LIST DOCD IN RCRD: CPT | Mod: S$GLB,,, | Performed by: INTERNAL MEDICINE

## 2017-09-28 PROCEDURE — 1126F AMNT PAIN NOTED NONE PRSNT: CPT | Mod: S$GLB,,, | Performed by: INTERNAL MEDICINE

## 2017-09-28 PROCEDURE — 3077F SYST BP >= 140 MM HG: CPT | Mod: S$GLB,,, | Performed by: INTERNAL MEDICINE

## 2017-09-28 PROCEDURE — 99214 OFFICE O/P EST MOD 30 MIN: CPT | Mod: S$GLB,,, | Performed by: INTERNAL MEDICINE

## 2017-09-28 PROCEDURE — 3008F BODY MASS INDEX DOCD: CPT | Mod: S$GLB,,, | Performed by: INTERNAL MEDICINE

## 2017-09-28 NOTE — PROGRESS NOTES
Subjective:    Patient ID:  Sarita Alvarez is a 67 y.o. female who presents for follow-up of Hypertension (Annual exam )      HPI   67 Year old female followed with hypertension, hyperlipidemia and post ablation of AVNRT in 2014..She also has diabetes on insulin. She has been dong well and reports no chest pain or THMOAS. She is walking more regularly. Her A1C has improved and BPs stable [see BP graft]  Lab Results   Component Value Date     08/04/2017    K 3.7 08/04/2017     08/04/2017    CO2 27 08/04/2017    BUN 19 08/04/2017    CREATININE 1.1 08/04/2017     (H) 08/04/2017    HGBA1C 8.1 (H) 08/04/2017    MG 2.3 04/26/2014    AST 21 08/04/2017    ALT 22 08/04/2017    ALBUMIN 3.8 08/04/2017    PROT 7.3 08/04/2017    BILITOT 0.3 08/04/2017    WBC 6.80 01/22/2016    HGB 14.6 01/22/2016    HCT 46.5 01/22/2016    MCV 82 01/22/2016     01/22/2016    INR 0.9 06/13/2014    TSH 3.129 09/19/2017         Lab Results   Component Value Date    CHOL 164 12/21/2016    HDL 70 12/21/2016    TRIG 37 12/21/2016       Lab Results   Component Value Date    LDLCALC 86.6 12/21/2016       Past Medical History:   Diagnosis Date    Diabetes mellitus type I     Diabetic peripheral neuropathy 7/9/2012    HTN (hypertension) 7/9/2012    Hyperlipidemia 7/9/2012    Osteoporosis, post-menopausal 7/9/2012    Poorly controlled type 1 diabetes mellitus with peripheral neuropathy 7/9/2012    Supraventricular tachycardia 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    SVT (supraventricular tachycardia) 7/9/2012    Thyroid disease     hyperthyroidism    Venous insufficiency (chronic) (peripheral) 7/9/2012       Current Outpatient Prescriptions:     amlodipine (NORVASC) 5 MG tablet, Take 1 tablet (5 mg total) by mouth once daily., Disp: 90 tablet, Rfl: 0    aspirin 81 mg Tab, Take 81 mg by mouth. 1 Tablet Oral Every day, Disp: , Rfl:     blood sugar diagnostic Strp, 1 each by Misc.(Non-Drug; Combo Route) route 4 (four) times daily., Disp:  "400 strip, Rfl: 3    CALCIUM CARBONATE/VITAMIN D3 (CALCIUM+D ORAL), Take by mouth. 1 Capsule Oral Twice a day , Disp: , Rfl:     enalapril (VASOTEC) 20 MG tablet, TAKE ONE TABLET BY MOUTH ONCE DAILY, Disp: 90 tablet, Rfl: 3    fluticasone (FLONASE) 50 mcg/actuation nasal spray, USE TWO SPRAYS IN EACH NOSTRIL ONCE DAILY (Patient taking differently: USE TWO SPRAYS IN EACH NOSTRIL ONCE DAILY PRN), Disp: 16 g, Rfl: 1    hydrochlorothiazide (HYDRODIURIL) 25 MG tablet, TAKE ONE TABLET BY MOUTH ONCE DAILY, Disp: 90 tablet, Rfl: 3    insulin aspart (NOVOLOG FLEXPEN) 100 unit/mL InPn pen, INJECT 6 UNITS INTO SKIN 3 TIMES DAILY WITH MEALS or ICR 1:13 max daily dose of 30-35 units, Disp: 2 Box, Rfl: 3    insulin glargine (LANTUS SOLOSTAR) 100 unit/mL (3 mL) InPn pen, Inject 20 Units into the skin once daily., Disp: 2 Box, Rfl: 3    insulin needles, disposable, (BD ULTRA-FINE MARIA D PEN NEEDLES) 32 x 5/32 " Ndle, 4 Sticks by Misc.(Non-Drug; Combo Route) route Daily., Disp: 400 each, Rfl: 1    lancets Misc, 1 each by Misc.(Non-Drug; Combo Route) route 4 (four) times daily., Disp: 400 each, Rfl: 3    methimazole (TAPAZOLE) 5 MG Tab, Take 1 tablet (5 mg total) by mouth once daily., Disp: 30 tablet, Rfl: 11    metoprolol succinate (TOPROL-XL) 25 MG 24 hr tablet, Take 1 tablet (25 mg total) by mouth once daily., Disp: 90 tablet, Rfl: 3    pantoprazole (PROTONIX) 40 MG tablet, Take 40 mg by mouth daily as needed. , Disp: , Rfl:     simvastatin (ZOCOR) 20 MG tablet, TAKE ONE TABLET BY MOUTH ONCE DAILY, Disp: 90 tablet, Rfl: 0    blood-glucose meter kit, Use as instructed, Disp: 1 each, Rfl: 0        Review of Systems   Constitution: Negative for decreased appetite, diaphoresis, fever, weakness, malaise/fatigue, weight gain and weight loss.   HENT: Negative for congestion, ear discharge, ear pain and nosebleeds.    Eyes: Negative for blurred vision, double vision and visual disturbance.   Cardiovascular: Negative for chest " "pain, claudication, cyanosis, dyspnea on exertion, irregular heartbeat, leg swelling, near-syncope, orthopnea, palpitations, paroxysmal nocturnal dyspnea and syncope.   Respiratory: Negative for cough, hemoptysis, shortness of breath, sleep disturbances due to breathing, snoring, sputum production and wheezing.    Endocrine: Negative for polydipsia, polyphagia and polyuria.   Hematologic/Lymphatic: Negative for adenopathy and bleeding problem. Does not bruise/bleed easily.   Skin: Negative for color change, nail changes, poor wound healing and rash.   Musculoskeletal: Negative for muscle cramps and muscle weakness.   Gastrointestinal: Negative for abdominal pain, anorexia, change in bowel habit, hematochezia, nausea and vomiting.   Genitourinary: Negative for dysuria, frequency and hematuria.   Neurological: Negative for brief paralysis, difficulty with concentration, excessive daytime sleepiness, dizziness, focal weakness, headaches, light-headedness, seizures and vertigo.   Psychiatric/Behavioral: Negative for altered mental status and depression.   Allergic/Immunologic: Negative for persistent infections.        Objective:BP (!) 141/66 (BP Location: Left arm, Patient Position: Sitting, BP Method: Medium (Automatic))   Pulse 63   Ht 5' 4" (1.626 m)   Wt 53.8 kg (118 lb 9.7 oz)   BMI 20.36 kg/m²           Physical Exam   Constitutional: She is oriented to person, place, and time. She appears well-developed and well-nourished.   HENT:   Head: Normocephalic.   Right Ear: External ear normal.   Left Ear: External ear normal.   Nose: Nose normal.   Inspection of lips, teeth and gums normal   Eyes: Conjunctivae and EOM are normal. Pupils are equal, round, and reactive to light. No scleral icterus.   Neck: Normal range of motion. No JVD present. No tracheal deviation present. No thyromegaly present.   Cardiovascular: Normal rate, regular rhythm, normal heart sounds and intact distal pulses.  Exam reveals no gallop " and no friction rub.    No murmur heard.  Pulses:       Dorsalis pedis pulses are 2+ on the right side, and 2+ on the left side.        Posterior tibial pulses are 2+ on the right side, and 2+ on the left side.   Pulmonary/Chest: Effort normal and breath sounds normal. No respiratory distress. She has no wheezes. She has no rales. She exhibits no tenderness.   Abdominal: Soft. Bowel sounds are normal. She exhibits no distension. There is no hepatosplenomegaly. There is no tenderness. There is no guarding.   Musculoskeletal: Normal range of motion. She exhibits no edema or tenderness.   Lymphadenopathy:   Palpation of lymph nodes of neck and groin normal   Neurological: She is oriented to person, place, and time. No cranial nerve deficit. She exhibits normal muscle tone. Coordination normal.   Skin: Skin is warm and dry. No rash noted. No erythema. No pallor.   Palpation of skin normal   Psychiatric: She has a normal mood and affect. Her behavior is normal. Judgment and thought content normal.         Assessment:       1. Essential hypertension    2. Mixed hyperlipidemia    3. Status post catheter ablation of slow pathway    4. Hashimoto's disease    5. Hyperthyroidism    6. Diabetic peripheral neuropathy associated with type 1 diabetes mellitus         Plan:       Sarita WAGNER was seen today for hypertension.    Diagnoses and all orders for this visit:    Essential hypertension    Mixed hyperlipidemia  -     Lipid panel; Future; Expected date: 09/28/2017  -     Lipid panel; Future; Expected date: 09/28/2018    Status post catheter ablation of slow pathway    Hashimoto's disease    Hyperthyroidism    Diabetic peripheral neuropathy associated with type 1 diabetes mellitus  -     Lipid panel; Future; Expected date: 09/28/2017  -     Lipid panel; Future; Expected date: 09/28/2018

## 2017-10-10 ENCOUNTER — OFFICE VISIT (OUTPATIENT)
Dept: ENDOCRINOLOGY | Facility: CLINIC | Age: 67
End: 2017-10-10
Payer: MEDICARE

## 2017-10-10 VITALS
HEIGHT: 64 IN | WEIGHT: 119.19 LBS | BODY MASS INDEX: 20.35 KG/M2 | DIASTOLIC BLOOD PRESSURE: 60 MMHG | HEART RATE: 70 BPM | RESPIRATION RATE: 16 BRPM | SYSTOLIC BLOOD PRESSURE: 110 MMHG

## 2017-10-10 DIAGNOSIS — E05.90 HYPERTHYROIDISM: ICD-10-CM

## 2017-10-10 DIAGNOSIS — E10.42 DIABETIC PERIPHERAL NEUROPATHY ASSOCIATED WITH TYPE 1 DIABETES MELLITUS: Primary | ICD-10-CM

## 2017-10-10 DIAGNOSIS — M81.0 OSTEOPOROSIS, POST-MENOPAUSAL: Chronic | ICD-10-CM

## 2017-10-10 DIAGNOSIS — E78.2 MIXED HYPERLIPIDEMIA: Chronic | ICD-10-CM

## 2017-10-10 DIAGNOSIS — I10 ESSENTIAL HYPERTENSION: Chronic | ICD-10-CM

## 2017-10-10 LAB
CREAT UR-MCNC: 150 MG/DL
MICROALBUMIN UR DL<=1MG/L-MCNC: 8 UG/ML
MICROALBUMIN/CREATININE RATIO: 5.3 UG/MG

## 2017-10-10 PROCEDURE — 82570 ASSAY OF URINE CREATININE: CPT

## 2017-10-10 PROCEDURE — 99214 OFFICE O/P EST MOD 30 MIN: CPT | Mod: S$GLB,,, | Performed by: NURSE PRACTITIONER

## 2017-10-10 PROCEDURE — 99999 PR PBB SHADOW E&M-EST. PATIENT-LVL III: CPT | Mod: PBBFAC,,, | Performed by: NURSE PRACTITIONER

## 2017-10-10 RX ORDER — METHIMAZOLE 5 MG/1
5 TABLET ORAL DAILY
Qty: 90 TABLET | Refills: 3 | Status: SHIPPED | OUTPATIENT
Start: 2017-10-10 | End: 2018-09-06 | Stop reason: SDUPTHER

## 2017-10-10 NOTE — PROGRESS NOTES
"CC: Management of Type 1 Diabetes    HPI: Ms. Sarita Alvarez is a 67 y.o. Black or  female who was diagnosed with gestational DM at age 42. In 1997, she was diagnosed with T2DM, however workup revealed NILAY.     Last seen with me in March 2017. At that time we discussed use of a personal CGM. Not currently on CGM therapy.     Checks BG 10x/day. Reports average readings are 90s-110s in the morning, 150s or lower before meals.     States when BGs are at 150s she becomes concerned about hypoglycemia.     Eats breakfast, lunch, and dinner. Counts carbs for meals/snacks.     Doesn't miss any insulin doses.     No interest in insulin pumps. She may be interested in a personal CGM- such as a Dexcom.      CURRENT DIABETIC MEDS: Lantus 20 units QAM, Novolog 1:13 I:C ratio (3-5 units with Novolog), goal 100, ISF 45  Uses Vials or Pens: Pens  Type of Glucose Meter: Relion Meter    Last Eye Exam: April 2016 (states she called to make an exam but the appointment has not been made)  Last Podiatry Exam: None    REVIEW OF SYSTEMS  General: no weight changes, fatigue, or fever.   Eyes: denies blurry vision, eye pain, or redness.  Cardiac: no palpitations or chest pain.   Respiratory: no cough or dyspnea.  GI: good appetite, no nausea or abdominal pain.   Skin: no rashes; occasional bruising at insulin injection sites.   Neuro: no numbness, dizziness, or tingling.    Vital Signs  /60 (BP Location: Left arm, Patient Position: Sitting, BP Method: Medium (Manual))   Pulse 70   Resp 16   Ht 5' 4" (1.626 m)   Wt 54.1 kg (119 lb 2.5 oz)   BMI 20.45 kg/m²     Hemoglobin A1C   Date Value Ref Range Status   08/04/2017 8.1 (H) 4.0 - 5.6 % Final     Comment:     According to ADA guidelines, hemoglobin A1c <7.0% represents  optimal control in non-pregnant diabetic patients. Different  metrics may apply to specific patient populations.   Standards of Medical Care in Diabetes-2016.  For the purpose of screening for " the presence of diabetes:  <5.7%     Consistent with the absence of diabetes  5.7-6.4%  Consistent with increasing risk for diabetes   (prediabetes)  >or=6.5%  Consistent with diabetes  Currently, no consensus exists for use of hemoglobin A1c  for diagnosis of diabetes for children.  This Hemoglobin A1c assay has significant interference with fetal   hemoglobin   (HbF). The results are invalid for patients with abnormal amounts of   HbF,   including those with known Hereditary Persistence   of Fetal Hemoglobin. Heterozygous hemoglobin variants (HbAS, HbAC,   HbAD, HbAE, HbA2) do not significantly interfere with this assay;   however, presence of multiple variants in a sample may impact the %   interference.     03/23/2017 9.2 (H) 4.5 - 6.2 % Final     Comment:     According to ADA guidelines, hemoglobin A1C <7.0% represents  optimal control in non-pregnant diabetic patients.  Different  metrics may apply to specific populations.   Standards of Medical Care in Diabetes - 2016.  For the purpose of screening for the presence of diabetes:  <5.7%     Consistent with the absence of diabetes  5.7-6.4%  Consistent with increasing risk for diabetes   (prediabetes)  >or=6.5%  Consistent with diabetes  Currently no consensus exists for use of hemoglobin A1C  for diagnosis of diabetes for children.     12/21/2016 9.1 (H) 4.5 - 6.2 % Final     Comment:     According to ADA guidelines, hemoglobin A1C <7.0% represents  optimal control in non-pregnant diabetic patients.  Different  metrics may apply to specific populations.   Standards of Medical Care in Diabetes - 2016.  For the purpose of screening for the presence of diabetes:  <5.7%     Consistent with the absence of diabetes  5.7-6.4%  Consistent with increasing risk for diabetes   (prediabetes)  >or=6.5%  Consistent with diabetes  Currently no consensus exists for use of hemoglobin A1C  for diagnosis of diabetes for children.       Lab Results   Component Value Date     CREATININE 1.1 08/04/2017      Lab Results   Component Value Date    LDLCALC 86.6 12/21/2016      Lab Results   Component Value Date    TSH 3.129 09/19/2017     Lab Results   Component Value Date    MICALBCREAT 4.3 09/29/2016     PHYSICAL EXAMINATION  Constitutional: Appears well, no distress  Neck: Supple, trachea midline.   Respiratory: CTA, even and unlabored.  Cardiovascular: RRR, S1 and S2 with no murmurs or extra sounds; no carotid bruits.  Lymph: DP pulses  2+ bilaterally; no edema.   Skin: warm and dry; no rash or insulin reactions observed  Diabetes Foot Exam:  Protective Sensation (w/ 10 gram monofilament):  Right: Intact  Left: Intact    Visual Inspection:  Normal -  Bilateral    Pedal Pulses:   Right: Present  Left: Present    Posterior tibialis:   Right:Present  Left: Present     Assessment/Plan  Diabetic peripheral neuropathy associated with type 1 diabetes mellitus:   Uncontrolled. A1C trending down.   Continue current insulin doses as documented above.   Continue frequent BG monitoring  Information provided on Dexcom- believe she would benefit from a personal CGMS as she is checking her BG 8-10 x/day and waking up overnight to check as well. Patient would like to think the device before we proceed with ordering. Website information given for her information.  **Patient to call Opthalmology for appointment**    Essential hypertension  BP at goal  Continue Amlodipine, Enalapril, HCTZ, and Metoprolol     Hyperlipidemia  LDL goal <100  Continue Simvastatin--max dose of 20 mg while on Amlodipine  Lipids today pending--per Dr. Santana    Hyperthyroidism  Continue Tapazole, which has been decreased to 5 mg daily per Dr. Jacob from August 2017.     Osteoporosis  On calcium and Vitamin D supplement.   Last BMD December 2016    FOLLOW UP  Return in about 3 months with staff for hyperthyroidism.

## 2017-10-24 DIAGNOSIS — E10.40 TYPE 1 DIABETES MELLITUS WITH DIABETIC NEUROPATHY: ICD-10-CM

## 2017-10-24 DIAGNOSIS — E78.5 HYPERLIPIDEMIA, UNSPECIFIED HYPERLIPIDEMIA TYPE: ICD-10-CM

## 2017-10-24 RX ORDER — SIMVASTATIN 20 MG/1
TABLET, FILM COATED ORAL
Qty: 90 TABLET | Refills: 3 | Status: SHIPPED | OUTPATIENT
Start: 2017-10-24 | End: 2018-11-19 | Stop reason: SDUPTHER

## 2017-10-24 RX ORDER — INSULIN GLARGINE 100 [IU]/ML
20 INJECTION, SOLUTION SUBCUTANEOUS DAILY
Qty: 2 BOX | Refills: 3 | Status: SHIPPED | OUTPATIENT
Start: 2017-10-24 | End: 2018-01-08

## 2017-10-24 NOTE — TELEPHONE ENCOUNTER
----- Message from Bret Huertas sent at 10/24/2017  8:50 AM CDT -----  Contact: Pt       x_  1st Request  _  2nd Request  _  3rd Request    Please refill the medication(s) listed below. Please call the patient when the prescription(s) is ready for  at this phone number      [phone]      Medication #1:  simvastatin (ZOCOR) 20 MG tablet     Medication #2: insulin glargine (LANTUS SOLOSTAR) 100 unit/mL (3 mL) InPn pen      Preferred Pharmacy:[facility] [address] [place] [address] [address]     Cheiloplasty (Complex) Text: A decision was made to reconstruct the defect with a  cheiloplasty.  The defect was undermined extensively.  Additional obicularis oris muscle was excised with a 15 blade scalpel.  The defect was converted into a full thickness wedge to facilite a better cosmetic result.  Small vessels were then tied off with 5-0 monocyrl. The obicularis oris, superficial fascia, adipose and dermis were then reapproximated.  After the deeper layers were approximated the epidermis was reapproximated with particular care given to realign the vermillion border.

## 2017-10-25 RX ORDER — INSULIN GLARGINE 100 [IU]/ML
INJECTION, SOLUTION SUBCUTANEOUS
Qty: 2 BOX | Refills: 3 | Status: SHIPPED | OUTPATIENT
Start: 2017-10-25 | End: 2018-01-08

## 2017-12-19 ENCOUNTER — TELEPHONE (OUTPATIENT)
Dept: OBSTETRICS AND GYNECOLOGY | Facility: CLINIC | Age: 67
End: 2017-12-19

## 2017-12-19 DIAGNOSIS — Z12.31 ENCOUNTER FOR SCREENING MAMMOGRAM FOR BREAST CANCER: Primary | ICD-10-CM

## 2017-12-19 NOTE — TELEPHONE ENCOUNTER
----- Message from Mai Bee sent at 12/19/2017  9:34 AM CST -----  Contact: self  _x  1st Request  _  2nd Request  _  3rd Request    Who: pt    Why: pt needs mammo ordered and scheduled.. Please advise    What Number to Call Back: [phone]  When to Expect a call back: (Before the end of the day)   -- if call after 3:00 call back will be tomorrow.

## 2017-12-26 DIAGNOSIS — E10.40 DIABETIC NEUROPATHY, TYPE I DIABETES MELLITUS: ICD-10-CM

## 2017-12-26 RX ORDER — INSULIN ASPART 100 [IU]/ML
INJECTION, SOLUTION INTRAVENOUS; SUBCUTANEOUS
Qty: 30 ML | Refills: 3 | Status: SHIPPED | OUTPATIENT
Start: 2017-12-26 | End: 2019-02-11 | Stop reason: SDUPTHER

## 2017-12-26 RX ORDER — INSULIN ASPART 100 [IU]/ML
INJECTION, SOLUTION INTRAVENOUS; SUBCUTANEOUS
Qty: 30 ML | Refills: 3 | Status: SHIPPED | OUTPATIENT
Start: 2017-12-26 | End: 2017-12-26 | Stop reason: SDUPTHER

## 2017-12-26 NOTE — TELEPHONE ENCOUNTER
----- Message from Julia Vick sent at 12/26/2017  1:34 PM CST -----  Contact: Patient herself  X  1st Request  _  2nd Request  _  3rd Request    Please refill the medication(s) listed below. Please call the patient when the prescription(s) is ready for  at the phone number [phone] .    Medication #1  NOVOLOG  Insulin Flexpen    Preferred Pharmacy:  [id number] - [place] - [address]  # [phone]  /  Fax# [phone]

## 2018-01-07 RX ORDER — AMLODIPINE BESYLATE 5 MG/1
TABLET ORAL
Qty: 90 TABLET | Refills: 3 | Status: SHIPPED | OUTPATIENT
Start: 2018-01-07 | End: 2019-01-04 | Stop reason: SDUPTHER

## 2018-01-08 ENCOUNTER — TELEPHONE (OUTPATIENT)
Dept: ENDOCRINOLOGY | Facility: CLINIC | Age: 68
End: 2018-01-08

## 2018-01-08 DIAGNOSIS — E10.9 TYPE 1 DIABETES MELLITUS NOT AT GOAL: Primary | ICD-10-CM

## 2018-01-08 RX ORDER — INSULIN DEGLUDEC 100 U/ML
20 INJECTION, SOLUTION SUBCUTANEOUS NIGHTLY
Qty: 6 ML | Refills: 11 | Status: SHIPPED | OUTPATIENT
Start: 2018-01-08 | End: 2019-01-29 | Stop reason: SDUPTHER

## 2018-01-08 NOTE — TELEPHONE ENCOUNTER
Spoke to pharmacy and they said that we would have to just send in a new Rx to see if it was covered. I spoke to patient asking if she would like me to start a PA on her Rx to see which Rx was covered. She said she only wants to talk to Edouard BELTRAN. That she would know what was covered and which she should take. I tried to explaine to the her that we won't know until we do a PA. She didn't want to hear that she only wanted to talk to PETERSON Gant NP. I told her I would send PETERSON Gant NP the message to call her.

## 2018-01-08 NOTE — TELEPHONE ENCOUNTER
Called and spoke with pt re: cost of Lantus. States when she went to  her Lantus her cost was $240 and she was told by pharmacy to call insurance company who then stated Lantus will be discontinued the first of this month. The preferred basals are Basaglar, Levemir, and Tresiba.     Switch to Tresiba in same starting dose as Lantus. Rx sent to pharmacy.

## 2018-01-08 NOTE — TELEPHONE ENCOUNTER
----- Message from Saranya Ellison sent at 1/8/2018  1:29 PM CST -----  Contact: Pt   [phone]  [place]  /   Patient prescription for Miguel tus is $240.00 , pt calling to get another medication or brand name  for an affordable price call the pt back with advice

## 2018-01-11 ENCOUNTER — LAB VISIT (OUTPATIENT)
Dept: LAB | Facility: HOSPITAL | Age: 68
End: 2018-01-11
Payer: MEDICARE

## 2018-01-11 ENCOUNTER — OFFICE VISIT (OUTPATIENT)
Dept: ENDOCRINOLOGY | Facility: CLINIC | Age: 68
End: 2018-01-11
Payer: MEDICARE

## 2018-01-11 VITALS
BODY MASS INDEX: 20.85 KG/M2 | DIASTOLIC BLOOD PRESSURE: 77 MMHG | SYSTOLIC BLOOD PRESSURE: 119 MMHG | HEIGHT: 64 IN | HEART RATE: 60 BPM | WEIGHT: 122.13 LBS

## 2018-01-11 DIAGNOSIS — E78.2 MIXED HYPERLIPIDEMIA: Chronic | ICD-10-CM

## 2018-01-11 DIAGNOSIS — I10 ESSENTIAL HYPERTENSION: Chronic | ICD-10-CM

## 2018-01-11 DIAGNOSIS — E05.90 HYPERTHYROIDISM: ICD-10-CM

## 2018-01-11 DIAGNOSIS — E10.42 DIABETIC PERIPHERAL NEUROPATHY ASSOCIATED WITH TYPE 1 DIABETES MELLITUS: Primary | ICD-10-CM

## 2018-01-11 DIAGNOSIS — E10.42 DIABETIC PERIPHERAL NEUROPATHY ASSOCIATED WITH TYPE 1 DIABETES MELLITUS: ICD-10-CM

## 2018-01-11 DIAGNOSIS — M81.0 OSTEOPOROSIS, POST-MENOPAUSAL: Chronic | ICD-10-CM

## 2018-01-11 LAB
ESTIMATED AVG GLUCOSE: 197 MG/DL
HBA1C MFR BLD HPLC: 8.5 %

## 2018-01-11 PROCEDURE — 99214 OFFICE O/P EST MOD 30 MIN: CPT | Mod: S$GLB,,, | Performed by: NURSE PRACTITIONER

## 2018-01-11 PROCEDURE — 83036 HEMOGLOBIN GLYCOSYLATED A1C: CPT

## 2018-01-11 PROCEDURE — 36415 COLL VENOUS BLD VENIPUNCTURE: CPT

## 2018-01-11 PROCEDURE — 99999 PR PBB SHADOW E&M-EST. PATIENT-LVL IV: CPT | Mod: PBBFAC,,, | Performed by: NURSE PRACTITIONER

## 2018-01-11 NOTE — PATIENT INSTRUCTIONS
What Is a Hiatal Hernia?    Hiatal hernia is when the area where the stomach and esophagus meet bulges up through the diaphragm into the chest cavity. In some cases, part of the stomach may bulge above the diaphragm. Stomach acid may move up into the esophagus and cause symptoms. The symptoms are often blamed on gastroesophageal reflux disease (GERD). You may only know about the hernia when it shows up on an X-ray taken for other reasons.   What you may feel  The hiatus is a normal hole in the diaphragm. The esophagus passes through this hole and leads to the stomach. In some cases, part of the stomach may bulge above the diaphragm. This bulge is called a hernia. Stomach acid may move up into the esophagus and cause symptoms.  When you eat, the muscle at the hiatus relaxes to allow food to pass into the stomach. It tightens again to keep food and digestive acids in the stomach.  Many people with hiatal hernias have mild symptoms. You may notice the following GERD symptoms:  · Heartburn or other chest discomfort  · A feeling of chest fullness after a meal  · Frequent burping  · Acid taste in the mouth  · Trouble swallowing  Treating symptoms  If you have been diagnosed with hiatal hernia, these suggestions may help improve symptoms:  · Lose excess weight. Extra weight puts pressure on the stomach and esophagus.  · Dont lie down after eating. Sit up for at least an hour after eating. Lying down after eating can increase symptoms.  · Avoid certain foods and drinks. These include fatty foods, chocolate, coffee, mint, and other foods that cause symptoms for you.  · Dont smoke or drink alcohol. These can worsen symptoms.  · Look at your medicines. Discuss your medicines with your healthcare provider. Many medicines can cause symptoms.  · Consider an antacid medicine. Ask your healthcare provider about over-the-counter and prescription medicines that may help.  · Ask about surgery, if needed. Surgery is a treatment  choice for some people. Your healthcare provider can determine if surgery is an option for you.    Date Last Reviewed: 10/1/2016  © 4421-3850 [employer]. [address] [place] [address], [place] [address]. All rights reserved. This information is not intended as a substitute for professional medical care. Always follow your healthcare professional's instructions.

## 2018-01-11 NOTE — PROGRESS NOTES
"CC: Management of Type 1 Diabetes    HPI: Ms. Sarita Alvarez is a 67 y.o. female who was diagnosed with gestational DM at age 42. In 1997, she was diagnosed with T2DM, however workup revealed NILAY.     Switched to Tresiba on 1/8/18 related to insurance formulary change.     No hospitalizations or illnesses since last visit with me.     No current A1C on file.     Checks BG 10x/day. Reports average readings are "as close to 110 and 96 as possible before I eat". After meals, BG up to 280, averaging 100s 3 hours postprandial.     No hypoglycemia.     Eats breakfast, lunch, and dinner. Counts carbs for meals/snacks.     Doesn't miss any insulin doses.     No interest in insulin pumps. She has not been interested in a personal CGM- such as a Dexcom.      CURRENT DIABETIC MEDS: Tresiba 20 units QAM, Novolog 1:13 I:C ratio (3-5 units with Novolog), goal 100, ISF 45  Uses Vials or Pens: Pens  Type of Glucose Meter: Relion Meter    Last Eye Exam: April 2016  Last Podiatry Exam: None  DM Education: May 2017    REVIEW OF SYSTEMS  General: no weight changes, fatigue, or fever.   Eyes: denies blurry vision, eye pain, or redness.  Cardiac: no palpitations or chest pain.   Respiratory: no cough or dyspnea.  GI: good appetite, no nausea or abdominal pain.   Skin: no rashes; occasional bruising at insulin injection sites.   Neuro: no numbness, dizziness, or tingling.    Vital Signs  /77 (BP Location: Right arm, Patient Position: Sitting)   Pulse 60   Ht 5' 4" (1.626 m)   Wt 55.4 kg (122 lb 1.6 oz)   BMI 20.96 kg/m²     Hemoglobin A1C   Date Value Ref Range Status   08/04/2017 8.1 (H) 4.0 - 5.6 % Final     Comment:     According to ADA guidelines, hemoglobin A1c <7.0% represents  optimal control in non-pregnant diabetic patients. Different  metrics may apply to specific patient populations.   Standards of Medical Care in Diabetes-2016.  For the purpose of screening for the presence of diabetes:  <5.7%     Consistent " with the absence of diabetes  5.7-6.4%  Consistent with increasing risk for diabetes   (prediabetes)  >or=6.5%  Consistent with diabetes  Currently, no consensus exists for use of hemoglobin A1c  for diagnosis of diabetes for children.  This Hemoglobin A1c assay has significant interference with fetal   hemoglobin   (HbF). The results are invalid for patients with abnormal amounts of   HbF,   including those with known Hereditary Persistence   of Fetal Hemoglobin. Heterozygous hemoglobin variants (HbAS, HbAC,   HbAD, HbAE, HbA2) do not significantly interfere with this assay;   however, presence of multiple variants in a sample may impact the %   interference.     03/23/2017 9.2 (H) 4.5 - 6.2 % Final     Comment:     According to ADA guidelines, hemoglobin A1C <7.0% represents  optimal control in non-pregnant diabetic patients.  Different  metrics may apply to specific populations.   Standards of Medical Care in Diabetes - 2016.  For the purpose of screening for the presence of diabetes:  <5.7%     Consistent with the absence of diabetes  5.7-6.4%  Consistent with increasing risk for diabetes   (prediabetes)  >or=6.5%  Consistent with diabetes  Currently no consensus exists for use of hemoglobin A1C  for diagnosis of diabetes for children.     12/21/2016 9.1 (H) 4.5 - 6.2 % Final     Comment:     According to ADA guidelines, hemoglobin A1C <7.0% represents  optimal control in non-pregnant diabetic patients.  Different  metrics may apply to specific populations.   Standards of Medical Care in Diabetes - 2016.  For the purpose of screening for the presence of diabetes:  <5.7%     Consistent with the absence of diabetes  5.7-6.4%  Consistent with increasing risk for diabetes   (prediabetes)  >or=6.5%  Consistent with diabetes  Currently no consensus exists for use of hemoglobin A1C  for diagnosis of diabetes for children.       Lab Results   Component Value Date    CREATININE 1.1 08/04/2017      Lab Results   Component  Value Date    LDLCALC 75.6 10/10/2017      Lab Results   Component Value Date    MICALBCREAT 5.3 10/10/2017     PHYSICAL EXAMINATION  Constitutional: Appears well, no distress  Neck: Supple, trachea midline.   Respiratory: CTA, even and unlabored.  Cardiovascular: RRR, S1 and S2 with no murmurs or extra sounds; no carotid bruits.  Lymph: DP pulses  2+ bilaterally; no edema.   Skin: warm and dry; no rash or insulin reactions observed  Diabetes Foot Exam: deferred; see foot exam from note dated 10/10/17; appropriate footwear     Assessment/Plan  Diabetic peripheral neuropathy associated with type 1 diabetes mellitus:   A1C today  Continue current insulin doses as documented above.   Continue frequent BG monitoring  Not interested in insulin pump or CGM.     Essential hypertension  BP stable  Continue Amlodipine, Enalapril, HCTZ, and Metoprolol     Hyperlipidemia  LDL goal <100  Continue Simvastatin--max dose of 20 mg while on Amlodipine  Managed by Cardiology    Hyperthyroidism  Continue Tapazole 5 mg daily per Dr. Jacob from August 2017.   Lab Results   Component Value Date    TSH 3.129 09/19/2017      Osteoporosis  On calcium and Vitamin D supplement.   Last BMD December 2016    FOLLOW UP  Follow-up in about 3 months (around 4/11/2018).

## 2018-01-25 ENCOUNTER — HOSPITAL ENCOUNTER (OUTPATIENT)
Dept: RADIOLOGY | Facility: HOSPITAL | Age: 68
Discharge: HOME | End: 2018-01-25
Attending: OBSTETRICS & GYNECOLOGY
Payer: MEDICARE

## 2018-01-25 DIAGNOSIS — Z12.31 ENCOUNTER FOR SCREENING MAMMOGRAM FOR BREAST CANCER: ICD-10-CM

## 2018-01-25 PROCEDURE — 77067 SCR MAMMO BI INCL CAD: CPT | Mod: TC,PO

## 2018-01-25 PROCEDURE — 77067 SCR MAMMO BI INCL CAD: CPT | Mod: 26,,, | Performed by: RADIOLOGY

## 2018-01-25 PROCEDURE — 77063 BREAST TOMOSYNTHESIS BI: CPT | Mod: 26,,, | Performed by: RADIOLOGY

## 2018-01-26 ENCOUNTER — TELEPHONE (OUTPATIENT)
Dept: OBSTETRICS AND GYNECOLOGY | Facility: CLINIC | Age: 68
End: 2018-01-26

## 2018-01-26 NOTE — TELEPHONE ENCOUNTER
Called pt and informed her that the results of her recent mammogram are normal. Pt communicated understanding and confirmed that she will make her 11:00 AM appt on 02/06/2018 with Dr. Bravo.

## 2018-02-06 ENCOUNTER — OFFICE VISIT (OUTPATIENT)
Dept: OBSTETRICS AND GYNECOLOGY | Facility: CLINIC | Age: 68
End: 2018-02-06
Attending: OBSTETRICS & GYNECOLOGY
Payer: MEDICARE

## 2018-02-06 VITALS
DIASTOLIC BLOOD PRESSURE: 54 MMHG | HEIGHT: 64 IN | WEIGHT: 122.81 LBS | BODY MASS INDEX: 20.97 KG/M2 | SYSTOLIC BLOOD PRESSURE: 120 MMHG

## 2018-02-06 DIAGNOSIS — Z01.419 WELL WOMAN EXAM WITH ROUTINE GYNECOLOGICAL EXAM: Primary | ICD-10-CM

## 2018-02-06 DIAGNOSIS — Z12.4 PAP SMEAR FOR CERVICAL CANCER SCREENING: ICD-10-CM

## 2018-02-06 DIAGNOSIS — Z78.0 POSTMENOPAUSAL STATUS: ICD-10-CM

## 2018-02-06 PROCEDURE — 99999 PR PBB SHADOW E&M-EST. PATIENT-LVL III: CPT | Mod: PBBFAC,,, | Performed by: OBSTETRICS & GYNECOLOGY

## 2018-02-06 PROCEDURE — 88175 CYTOPATH C/V AUTO FLUID REDO: CPT

## 2018-02-06 PROCEDURE — G0101 CA SCREEN;PELVIC/BREAST EXAM: HCPCS | Mod: S$GLB,,, | Performed by: OBSTETRICS & GYNECOLOGY

## 2018-02-06 NOTE — PROGRESS NOTES
Sarita Alvarez is a 68 y.o. year old  female who presents for routine GYN exam.  She is postmenopausal, not on HRT.  No bleeding.  Reports very infrequent hot flashes.  Denies any significant changes in her medical or surgical history over the past year.  Recent hemoglobin A1c 2018 was 8.5.  Mammogram last month was negative.  No GYN complaints.    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   SKIN: Denies rash or lesions.   HEAD: Denies head injury or headache.   NODES: Denies enlarged lymph nodes.   CHEST: Denies chest pain or shortness of breath.   CARDIOVASCULAR: Denies palpitations or left sided chest pain.   ABDOMEN: No abdominal pain, nausea, vomiting or rectal bleeding.   URINARY: No dysuria or hematuria.  REPRODUCTIVE: See HPI.   BREASTS: Denies pain, lumps, or nipple discharge.   HEMATOLOGIC: No easy bruisability or excessive bleeding.   MUSCULOSKELETAL: Denies joint pain.  NEUROLOGIC: Denies syncope or weakness.   PSYCHIATRIC: Denies depression.     PE:   (chaperone present during entire exam)  APPEARANCE: Well  nourished, well developed, in no acute distress.  BREASTS: Symmetrical, no skin changes or visible lesions. No palpable masses, nipple discharge or adenopathy bilaterally.  ABDOMEN: Soft. No tenderness or masses. No hernias. No CVA tenderness.  VULVA: Atrophic. No lesions. Normal female genitalia.  URETHRAL MEATUS: Normal size and location, no lesions, no prolapse.  URETHRA: No masses, tenderness, prolapse or scarring.  VAGINA: Atrophic.  No lesions, no discharge, no significant cystocele or rectocele.  CERVIX: No lesions and discharge. PAP done.  UTERUS: Normal size, regular shape, mobile, non-tender, bladder base nontender.  ADNEXA: No masses, tenderness or CDS nodularity.  ANUS PERINEUM: Normal.    Diagnosis:  1. Well woman exam with routine gynecological exam    2. Postmenopausal status    3. Pap smear for cervical cancer screening          PLAN:    Orders Placed This Encounter    Liquid-based pap smear, screening       Patient was counseled today on postmenopausal issues.  She was encouraged to try to improve her glycemic control.    Follow-up in 1 year.

## 2018-02-12 ENCOUNTER — TELEPHONE (OUTPATIENT)
Dept: OBSTETRICS AND GYNECOLOGY | Facility: CLINIC | Age: 68
End: 2018-02-12

## 2018-02-12 DIAGNOSIS — I10 HYPERTENSION, UNSPECIFIED TYPE: Primary | ICD-10-CM

## 2018-02-12 RX ORDER — ENALAPRIL MALEATE 20 MG/1
TABLET ORAL
Qty: 90 TABLET | Refills: 3 | Status: SHIPPED | OUTPATIENT
Start: 2018-02-12 | End: 2019-02-10 | Stop reason: SDUPTHER

## 2018-02-12 RX ORDER — METRONIDAZOLE 500 MG/1
500 TABLET ORAL 2 TIMES DAILY
Qty: 14 TABLET | Refills: 0 | Status: SHIPPED | OUTPATIENT
Start: 2018-02-12 | End: 2018-02-19

## 2018-02-12 NOTE — TELEPHONE ENCOUNTER
----- Message from Mai Bee sent at 2/12/2018  1:13 PM CST -----  Contact: self  x_  1st Request  _  2nd Request  _  3rd Request    Who: pt    Why: pt needs enalapril (VASOTEC) 20 MG tablet refilled.. Please advise    What Number to Call Back: [phone]    When to Expect a call back: (Before the end of the day)   -- if call after 3:00 call back will be tomorrow.

## 2018-02-12 NOTE — TELEPHONE ENCOUNTER
Called patient:    Discussed results of Pap:    FINAL DIAGNOSTIC INTERPRETATION  Negative for intraepithelial lesion or malignancy.  Shift in cisco suggestive of bacterial vaginosis.  Endocervical component is present.  FINAL DIAGNOSTIC COMMENTS    Reports noting an increased discharge.    Rx: Flagyl 500 mg bid x 7 days, no alcohol.

## 2018-03-06 ENCOUNTER — TELEPHONE (OUTPATIENT)
Dept: INTERNAL MEDICINE | Facility: CLINIC | Age: 68
End: 2018-03-06

## 2018-03-06 DIAGNOSIS — E10.42 DIABETIC PERIPHERAL NEUROPATHY ASSOCIATED WITH TYPE 1 DIABETES MELLITUS: Primary | ICD-10-CM

## 2018-03-06 NOTE — TELEPHONE ENCOUNTER
Spoke with patient , scheduled DM f/u with labs at the end of March per patient request. Appointment reminder mailed. Patient had no questions or concerns at this time.

## 2018-03-15 ENCOUNTER — TELEPHONE (OUTPATIENT)
Dept: OBSTETRICS AND GYNECOLOGY | Facility: CLINIC | Age: 68
End: 2018-03-15

## 2018-03-15 NOTE — TELEPHONE ENCOUNTER
----- Message from Olu Lacey sent at 3/15/2018 10:14 AM CDT -----  PLEASE CALL PT SHE HAS A QUESTION [phone]

## 2018-03-15 NOTE — TELEPHONE ENCOUNTER
Patient's questions concerning BV and how it is acquired were answered. Patient verbalized understanding

## 2018-03-27 ENCOUNTER — OFFICE VISIT (OUTPATIENT)
Dept: INTERNAL MEDICINE | Facility: CLINIC | Age: 68
End: 2018-03-27
Payer: MEDICARE

## 2018-03-27 ENCOUNTER — LAB VISIT (OUTPATIENT)
Dept: LAB | Facility: OTHER | Age: 68
End: 2018-03-27
Attending: INTERNAL MEDICINE
Payer: MEDICARE

## 2018-03-27 ENCOUNTER — TELEPHONE (OUTPATIENT)
Dept: INTERNAL MEDICINE | Facility: CLINIC | Age: 68
End: 2018-03-27

## 2018-03-27 VITALS
DIASTOLIC BLOOD PRESSURE: 74 MMHG | HEIGHT: 64 IN | SYSTOLIC BLOOD PRESSURE: 122 MMHG | BODY MASS INDEX: 21 KG/M2 | WEIGHT: 123 LBS | HEART RATE: 57 BPM

## 2018-03-27 DIAGNOSIS — I10 ESSENTIAL HYPERTENSION: Chronic | ICD-10-CM

## 2018-03-27 DIAGNOSIS — I87.2 VENOUS INSUFFICIENCY (CHRONIC) (PERIPHERAL): Chronic | ICD-10-CM

## 2018-03-27 DIAGNOSIS — E05.90 HYPERTHYROIDISM: ICD-10-CM

## 2018-03-27 DIAGNOSIS — Z86.79 STATUS POST CATHETER ABLATION OF SLOW PATHWAY: ICD-10-CM

## 2018-03-27 DIAGNOSIS — I47.10 SUPRAVENTRICULAR TACHYCARDIA: ICD-10-CM

## 2018-03-27 DIAGNOSIS — E10.42 DIABETIC PERIPHERAL NEUROPATHY ASSOCIATED WITH TYPE 1 DIABETES MELLITUS: ICD-10-CM

## 2018-03-27 DIAGNOSIS — I70.0 AORTIC ATHEROSCLEROSIS: ICD-10-CM

## 2018-03-27 DIAGNOSIS — E06.3 HASHIMOTO'S DISEASE: ICD-10-CM

## 2018-03-27 DIAGNOSIS — Z98.890 STATUS POST CATHETER ABLATION OF SLOW PATHWAY: ICD-10-CM

## 2018-03-27 DIAGNOSIS — M81.0 OSTEOPOROSIS, POST-MENOPAUSAL: Chronic | ICD-10-CM

## 2018-03-27 DIAGNOSIS — E78.2 MIXED HYPERLIPIDEMIA: Chronic | ICD-10-CM

## 2018-03-27 DIAGNOSIS — E10.42 DIABETIC PERIPHERAL NEUROPATHY ASSOCIATED WITH TYPE 1 DIABETES MELLITUS: Primary | ICD-10-CM

## 2018-03-27 LAB
ESTIMATED AVG GLUCOSE: 197 MG/DL
HBA1C MFR BLD HPLC: 8.5 %

## 2018-03-27 PROCEDURE — 36415 COLL VENOUS BLD VENIPUNCTURE: CPT

## 2018-03-27 PROCEDURE — 3045F PR MOST RECENT HEMOGLOBIN A1C LEVEL 7.0-9.0%: CPT | Mod: CPTII,S$GLB,, | Performed by: INTERNAL MEDICINE

## 2018-03-27 PROCEDURE — 99214 OFFICE O/P EST MOD 30 MIN: CPT | Mod: S$GLB,,, | Performed by: INTERNAL MEDICINE

## 2018-03-27 PROCEDURE — 3078F DIAST BP <80 MM HG: CPT | Mod: CPTII,S$GLB,, | Performed by: INTERNAL MEDICINE

## 2018-03-27 PROCEDURE — 99999 PR PBB SHADOW E&M-EST. PATIENT-LVL III: CPT | Mod: PBBFAC,,, | Performed by: INTERNAL MEDICINE

## 2018-03-27 PROCEDURE — 83036 HEMOGLOBIN GLYCOSYLATED A1C: CPT

## 2018-03-27 PROCEDURE — 3074F SYST BP LT 130 MM HG: CPT | Mod: CPTII,S$GLB,, | Performed by: INTERNAL MEDICINE

## 2018-03-27 NOTE — TELEPHONE ENCOUNTER
Pt has been informed of results and advice.  States verbal understanding.  Patient has no further questions or concerns.

## 2018-03-27 NOTE — TELEPHONE ENCOUNTER
Inform pt that A1c is 8.5 which is unchanged from prior. She should f/u with endocrinology to discuss next best steps in optimizing sugars.

## 2018-03-27 NOTE — PROGRESS NOTES
Subjective:       Patient ID: Sarita Alvarez is a 68 y.o. female.    Chief Complaint: Diabetes    Pt here for f/u. Pt has DM1 and is followed by endocrinology. She is on Tresiba 20 units and novolog 6 units plus SSI per carb counting. Her last A1C was 8.5. She has complications of diabetes that includes neuropathy in feet. This is tolerable without meds. No recent lows. Her fasting sugars are 130s-140s and prandial sugars are under 150. She reports skipping insulin at times if sugar is on low side. She also waits until after eating to give insulin. We again reviewed proper use of insulin.     She has varicose veins that cause some swelling at times but manages with compression hose. Has seen vascular in past.     She also sees endocrinology for hyperthyroidism/hashimotos. She is on tapazole and monitored regularly. She is clinically euthyroid.     Pt's BP is well controlled. Tolerating meds well. Pt denies cp/sob/ha/vision or neuro changes. Checking at home and is well controlled.     HLD is tx with simvastatin which she tolerates well.     Pt has osteoporosis. She has not tolerated actonel due to CP in past, presumably esophagitis. She is not willing to try another bisphosphonate either oral or IV. We discussed prolia but she declines. She understands r/b of this. She is engaged in resistance exercises.      Pt had symptomatic SVT s/p ablation and has not had recurrence.        Medication Refill   Pertinent negatives include no chest pain or headaches.   Hypertension   Pertinent negatives include no chest pain, headaches or shortness of breath.   Diabetes   Pertinent negatives for hypoglycemia include no dizziness or headaches. Pertinent negatives for diabetes include no chest pain and no polyuria.     Review of Systems   Constitutional: Negative for unexpected weight change.   Eyes: Negative for visual disturbance.   Respiratory: Negative for shortness of breath.    Cardiovascular: Negative for chest pain.    Gastrointestinal: Negative for blood in stool.   Endocrine: Negative for polyuria.   Genitourinary: Negative for frequency.   Neurological: Negative for dizziness, light-headedness and headaches.   Psychiatric/Behavioral: Negative for dysphoric mood.       Objective:          Assessment:       1. Diabetic peripheral neuropathy associated with type 1 diabetes mellitus    2. Aortic atherosclerosis    3. Essential hypertension    4. Mixed hyperlipidemia    5. Hashimoto's disease    6. Hyperthyroidism    7. Venous insufficiency (chronic) (peripheral)    8. Osteoporosis, post-menopausal    9. Supraventricular tachycardia    10. Status post catheter ablation of slow pathway        Plan:       1. Reviewed recent labs    2. Keep f/u with endocrinology; keep close log of sugars  3. Keep f/u with specialists  4. Pt to schedule f/u with ophtho  5. Home BP and BS monitoring            Physical Exam   Constitutional: She is oriented to person, place, and time. She appears well-developed and well-nourished.   HENT:   Head: Normocephalic and atraumatic.   Mouth/Throat: No oropharyngeal exudate or posterior oropharyngeal erythema.   Eyes: EOM are normal. Pupils are equal, round, and reactive to light.   Neck: Neck supple. Carotid bruit is not present. No thyroid mass and no thyromegaly present.   Cardiovascular: Normal rate, regular rhythm, S1 normal, S2 normal and normal heart sounds.    No murmur heard.  Pulses:       Radial pulses are 2+ on the right side, and 2+ on the left side.   Pulmonary/Chest: Effort normal and breath sounds normal. She has no wheezes.   Abdominal: Soft. Bowel sounds are normal. She exhibits no distension and no mass. There is no hepatosplenomegaly. There is no tenderness.   Musculoskeletal: She exhibits no edema.        Right foot: Normal.        Left foot: Normal.   Lymphadenopathy:     She has no cervical adenopathy.   Neurological: She is alert and oriented to person, place, and time. No cranial  nerve deficit.   Psychiatric: She has a normal mood and affect. Her behavior is normal.

## 2018-04-05 RX ORDER — PANTOPRAZOLE SODIUM 40 MG/1
40 TABLET, DELAYED RELEASE ORAL DAILY PRN
Qty: 30 TABLET | Refills: 1 | Status: SHIPPED | OUTPATIENT
Start: 2018-04-05 | End: 2019-09-17 | Stop reason: SDUPTHER

## 2018-04-05 NOTE — TELEPHONE ENCOUNTER
Requested info from pt regarding request for Protonix. Informed pt rx wasn't approved by PCP in the past. Pt states she has a hx of Acid reflux and  hiatal hernia dx by Dr. Manzano.states an EGD was performed.  Pt states Protonix rx is taken as needed. Pt states she is in need of refill. Pt would like a call from PCP to discuss need for rx and if rx is appropriate. Please advise/authorize?

## 2018-04-05 NOTE — TELEPHONE ENCOUNTER
Pt informed of rx approval.  States verbal understanding.  Patient has no further questions or concerns.

## 2018-04-05 NOTE — TELEPHONE ENCOUNTER
----- Message from Mai Bee sent at 4/5/2018 11:49 AM CDT -----  Contact: LICHA CHENEY [[id number]]  Can the clinic reply in [facility]: no    Please refill the medication(s) listed below. Please call the patient when the prescription(s) is ready for  at this phone number          Medication #1  pantoprazole (PROTONIX) 40 MG tablet  Medication #2    Preferred Pharmacy:[facility] - [place] [address] [address]

## 2018-04-25 ENCOUNTER — OFFICE VISIT (OUTPATIENT)
Dept: OPTOMETRY | Facility: CLINIC | Age: 68
End: 2018-04-25
Payer: MEDICARE

## 2018-04-25 DIAGNOSIS — H52.13 MYOPIA WITH ASTIGMATISM AND PRESBYOPIA, BILATERAL: ICD-10-CM

## 2018-04-25 DIAGNOSIS — H52.4 MYOPIA WITH ASTIGMATISM AND PRESBYOPIA, BILATERAL: ICD-10-CM

## 2018-04-25 DIAGNOSIS — H25.13 NUCLEAR SCLEROSIS OF BOTH EYES: ICD-10-CM

## 2018-04-25 DIAGNOSIS — E10.9 TYPE 1 DIABETES MELLITUS WITHOUT RETINOPATHY: Primary | ICD-10-CM

## 2018-04-25 DIAGNOSIS — H52.203 MYOPIA WITH ASTIGMATISM AND PRESBYOPIA, BILATERAL: ICD-10-CM

## 2018-04-25 PROCEDURE — 92014 COMPRE OPH EXAM EST PT 1/>: CPT | Mod: S$GLB,,, | Performed by: OPTOMETRIST

## 2018-04-25 PROCEDURE — 99999 PR PBB SHADOW E&M-EST. PATIENT-LVL II: CPT | Mod: PBBFAC,,, | Performed by: OPTOMETRIST

## 2018-04-25 NOTE — LETTER
April 25, 2018      Venu Ford MD  [address]  [place] [address]           [facility]  [address]  [place] [address]  Phone: [phone]  Fax: [phone]          Patient: Sarita Alvarez   MR Number: [id number]   YOB: 1950   Date of Visit: 4/25/2018       Dear Dr. Venu Ford:    Thank you for referring Sarita Alvarez to me for evaluation. Attached you will find relevant portions of my assessment and plan of care.    If you have questions, please do not hesitate to call me. I look forward to following Sarita Alvarez along with you.    Sincerely,    Leidy Rosas, OD    Enclosure  CC:  No Recipients    If you would like to receive this communication electronically, please contact [email or web address] or [phone] to request more information on [facility] access.    For providers and/or their staff who would like to refer a patient to [facility], please contact us through our one-stop-shop provider referral line, [facility], at [phone].    If you feel you have received this communication in error or would no longer like to receive these types of communications, please e-mail [email or web address]

## 2018-04-25 NOTE — PROGRESS NOTES
HPI     Patient's last dilated exam was: 4/27/2016 w/ Dr. Watters    Pt here for diabetic eye exam. Denies changes in vision. Did not bring her   glasses with her today. Patient denies flashes, floaters, pain and double   vision. Not using any gtts, no other ocular complaints. Very sensitive to   car headlight glare when other cars have their high beams on, otherwise   she says she sees fine at night. Blood sugar was 113 this morning. She   thinks she has a Fm Hx of glaucoma     Hemoglobin A1C       Date                     Value               Ref Range             Status                03/27/2018               8.5 (H)             4.0 - 5.6 %           Final                  01/11/2018               8.5 (H)             4.0 - 5.6 %           Final                 08/04/2017               8.1 (H)             4.0 - 5.6 %           Final                  Last edited by Razia Ward, PCT on 4/25/2018 10:30 AM.   (History)            Assessment /Plan     For exam results, see Encounter Report.    Type 1 diabetes mellitus without retinopathy    Nuclear sclerosis of both eyes    Myopia with astigmatism and presbyopia, bilateral          1.  No retinopathy--monitor yearly.  BS control.  2.  Educated on cataracts and affects on vision.  Patient happy with vision.  Monitor.  3.  Continue w/ current rx

## 2018-04-30 RX ORDER — HYDROCHLOROTHIAZIDE 25 MG/1
TABLET ORAL
Qty: 90 TABLET | Refills: 3 | Status: SHIPPED | OUTPATIENT
Start: 2018-04-30 | End: 2019-07-25

## 2018-04-30 RX ORDER — HYDROCHLOROTHIAZIDE 25 MG/1
25 TABLET ORAL DAILY
Qty: 90 TABLET | Refills: 3 | Status: SHIPPED | OUTPATIENT
Start: 2018-04-30 | End: 2018-08-31 | Stop reason: SDUPTHER

## 2018-05-17 ENCOUNTER — TELEPHONE (OUTPATIENT)
Dept: INTERNAL MEDICINE | Facility: CLINIC | Age: 68
End: 2018-05-17

## 2018-05-17 NOTE — TELEPHONE ENCOUNTER
----- Message from Morteza Day sent at 5/17/2018  1:34 PM CDT -----  Contact: Vianey ([employer])  Name of Who is Calling: Vianey ([employer])      What is the request in detail: Needs a medical necessity form, doctors orders, and clinical notes fax to [phone]; to start paying for patient diabetic supply      Can the clinic reply by [facility]: no      What Number to Call Back if not in [facility]: [phone]

## 2018-07-27 NOTE — TELEPHONE ENCOUNTER
----- Message from Jojo Harman sent at 7/27/2018  2:55 PM CDT -----  Name of Who is Calling:LICHA CHENEY [[id number]]      What is the request in detail: Pt would like to speak with staff about her diabetes test strips      Can the clinic reply by [facility]:   No       What Number to Call Back if not in [facility]:[phone]

## 2018-08-06 DIAGNOSIS — I47.10 SVT (SUPRAVENTRICULAR TACHYCARDIA): ICD-10-CM

## 2018-08-06 RX ORDER — METOPROLOL SUCCINATE 25 MG/1
25 TABLET, EXTENDED RELEASE ORAL DAILY
Qty: 90 TABLET | Refills: 3 | Status: SHIPPED | OUTPATIENT
Start: 2018-08-06 | End: 2018-08-31 | Stop reason: SDUPTHER

## 2018-08-06 RX ORDER — METOPROLOL SUCCINATE 25 MG/1
TABLET, EXTENDED RELEASE ORAL
Qty: 90 TABLET | Refills: 3 | Status: SHIPPED | OUTPATIENT
Start: 2018-08-06 | End: 2019-08-01

## 2018-08-06 NOTE — TELEPHONE ENCOUNTER
----- Message from Merry Santana sent at 8/6/2018  9:58 AM CDT -----  Can the clinic reply in [facility]: No      Please refill the medication(s) listed below. The patient can be reached at this phone number [phone] once it is called into the pharmacy.      Medication #1 metoprolol succinate (TOPROL-XL) 25 MG 24 hr tablet 90 tablet           Preferred Pharmacy:  On File

## 2018-08-06 NOTE — TELEPHONE ENCOUNTER
Last office visit 3/27/2018. Patient request refill of Toprol-XL 25 mgm q day. Pended prescription to dispense 90 tabs with 3 refills.  Agnieszka Oropeza RN

## 2018-08-31 ENCOUNTER — LAB VISIT (OUTPATIENT)
Dept: LAB | Facility: HOSPITAL | Age: 68
End: 2018-08-31
Payer: MEDICARE

## 2018-08-31 ENCOUNTER — OFFICE VISIT (OUTPATIENT)
Dept: ENDOCRINOLOGY | Facility: CLINIC | Age: 68
End: 2018-08-31
Payer: MEDICARE

## 2018-08-31 VITALS
BODY MASS INDEX: 20.85 KG/M2 | SYSTOLIC BLOOD PRESSURE: 119 MMHG | WEIGHT: 122.13 LBS | HEART RATE: 80 BPM | HEIGHT: 64 IN | DIASTOLIC BLOOD PRESSURE: 70 MMHG

## 2018-08-31 DIAGNOSIS — E10.42 DIABETIC PERIPHERAL NEUROPATHY ASSOCIATED WITH TYPE 1 DIABETES MELLITUS: ICD-10-CM

## 2018-08-31 DIAGNOSIS — E05.90 HYPERTHYROIDISM: ICD-10-CM

## 2018-08-31 DIAGNOSIS — E78.2 MIXED HYPERLIPIDEMIA: Chronic | ICD-10-CM

## 2018-08-31 DIAGNOSIS — M81.0 OSTEOPOROSIS, POST-MENOPAUSAL: Chronic | ICD-10-CM

## 2018-08-31 DIAGNOSIS — I10 ESSENTIAL HYPERTENSION: Chronic | ICD-10-CM

## 2018-08-31 LAB
ESTIMATED AVG GLUCOSE: 200 MG/DL
HBA1C MFR BLD HPLC: 8.6 %
TSH SERPL DL<=0.005 MIU/L-ACNC: 2.73 UIU/ML

## 2018-08-31 PROCEDURE — 3074F SYST BP LT 130 MM HG: CPT | Mod: CPTII,S$GLB,, | Performed by: NURSE PRACTITIONER

## 2018-08-31 PROCEDURE — 83036 HEMOGLOBIN GLYCOSYLATED A1C: CPT

## 2018-08-31 PROCEDURE — 3045F PR MOST RECENT HEMOGLOBIN A1C LEVEL 7.0-9.0%: CPT | Mod: CPTII,S$GLB,, | Performed by: NURSE PRACTITIONER

## 2018-08-31 PROCEDURE — 3078F DIAST BP <80 MM HG: CPT | Mod: CPTII,S$GLB,, | Performed by: NURSE PRACTITIONER

## 2018-08-31 PROCEDURE — 36415 COLL VENOUS BLD VENIPUNCTURE: CPT

## 2018-08-31 PROCEDURE — 99214 OFFICE O/P EST MOD 30 MIN: CPT | Mod: S$GLB,,, | Performed by: NURSE PRACTITIONER

## 2018-08-31 PROCEDURE — 99499 UNLISTED E&M SERVICE: CPT | Mod: S$GLB,,, | Performed by: NURSE PRACTITIONER

## 2018-08-31 PROCEDURE — 99999 PR PBB SHADOW E&M-EST. PATIENT-LVL IV: CPT | Mod: PBBFAC,,, | Performed by: NURSE PRACTITIONER

## 2018-08-31 PROCEDURE — 84443 ASSAY THYROID STIM HORMONE: CPT

## 2018-08-31 NOTE — PATIENT INSTRUCTIONS
Snacks can be an important part of a balanced, healthy meal plan. They allow you to eat more frequently, feeling full and satisfied throughout the day. Also, they allow you to spread carbohydrates evenly, which may stabilize blood sugars.  Plus, snacks are enjoyable!     The amount of carbohydrate needed at snacks varies. Generally, about 15-30 grams of carbohydrate per snack is recommended.  Below you will find some tasty treats.       0-5 gm carb   Crystal Light   Vitamin Water Zero   Herbal tea, unsweetened   2 tsp peanut butter on celery   1./2 cup sugar-free jell-o   1 sugar-free popsicle   ¼ cup blueberries   8oz Blue Tameka unsweetened almond milk   5 baby carrots & celery sticks, cucumbers, bell peppers dipped in ¼ cup salsa, 2Tbsp light ranch dressing or 2Tbsp plain Greek yogurt   10 Goldfish crackers   ½ oz low-fat cheese or string cheese   1 closed handful of nuts, unsalted   1 Tbsp of sunflower seeds, unsalted   1 cup Smart Pop popcorn   1 whole grain brown rice cake        15 gm carb   1 small piece of fruit or ½ banana or 1/2 cup lite canned fruit   3 orin cracker squares   3 cups Smart Pop popcorn, top spray butter, Farfan lite salt or cinnamon and Truvia   5 Vanilla Wafers   ½ cup low fat, no added sugar ice cream or frozen yogurt (Blue bell, Blue Bunny, Weight Watchers, Skinny Cow)   ½ turkey, ham, or chicken sandwich   ½ c fruit with ½ c Cottage cheese   4-6 unsalted wheat crackers with 1 oz low fat cheese or 1 tbsp peanut butter    30-45 goldfish crackers (depending on flavor)    7-8 Church mini brown rice cakes (caramel, apple cinnamon, chocolate)    12 Church mini brown rice cakes (cheddar, bbq, ranch)    1/3 cup hummus dip with raw veg   1/2 whole wheat katerine, 1Tbsp hummus   Mini Pizza (1/2 whole wheat English muffin, low-fat  cheese, tomato sauce)   100 calorie snack pack (Oreo, Chips Ahoy, Ritz Mix, Baked Cheetos)   4-6 oz. light or Greek Style yogurt  (Aranza Sanchez, Sangeetha, [facility])   ½ cup sugar-free pudding     6 in. wheat tortilla or katerine oven toasted chips (topped with spray butter flavoring, cinnamon, Truvia OR spray butter, garlic powder, chili powder)    18 BBQ Popchips (available at [employer], [employer] Foods, Fresh Market)                   Diabetes Support Group Meetings         Date: Topic:   February 8 Health Promotion/Cooking Demo   March 8 Taking Care of Your Kidneys   April 12 Taking Care of Your Feet   May 10 Ease Your Mind with Diabetes   Jo 14 Summer Treats/Cooking Demo   July 12 Super Market Sweep   August 9 Taking Care of Your Eyes   Sept 13 Technology/ADA updates   October 11 Recipes & Treats/Cooking Demo   November 8 Heart Health/Pump it up!   December 13 Year-End Close Out        Meetings are held in the [facility] (A) of the [facility] located at [address] [place] [address]. Please call [phone] for additional information.    Free service, offered every 2nd Thursday of every month! Family members and/or friends are welcome as well!  Support group is for patients with type 1 or type 2 diabetes.    From 3:30p to 4:30p

## 2018-09-06 DIAGNOSIS — E05.90 HYPERTHYROIDISM: ICD-10-CM

## 2018-09-06 RX ORDER — METHIMAZOLE 5 MG/1
5 TABLET ORAL DAILY
Qty: 90 TABLET | Refills: 3 | Status: SHIPPED | OUTPATIENT
Start: 2018-09-06 | End: 2019-09-03 | Stop reason: SDUPTHER

## 2018-09-06 NOTE — TELEPHONE ENCOUNTER
----- Message from Morteza Day sent at 9/6/2018  9:10 AM CDT -----  Contact: LICHA CHENEY [[id number]]      Can the clinic reply in [facility]: no      Please refill the medication(s) listed below. Please call the patient when the prescription(s) is ready for  at this phone number   [phone]      Medication #1 methimazole (TAPAZOLE) 5 MG Tab (out of meds)     Preferred Pharmacy: [facility] - [place] [address]

## 2018-09-11 ENCOUNTER — OFFICE VISIT (OUTPATIENT)
Dept: INTERNAL MEDICINE | Facility: CLINIC | Age: 68
End: 2018-09-11
Payer: MEDICARE

## 2018-09-11 ENCOUNTER — LAB VISIT (OUTPATIENT)
Dept: LAB | Facility: OTHER | Age: 68
End: 2018-09-11
Attending: INTERNAL MEDICINE
Payer: MEDICARE

## 2018-09-11 VITALS
SYSTOLIC BLOOD PRESSURE: 122 MMHG | HEART RATE: 59 BPM | BODY MASS INDEX: 20.82 KG/M2 | DIASTOLIC BLOOD PRESSURE: 78 MMHG | WEIGHT: 121.94 LBS | HEIGHT: 64 IN

## 2018-09-11 DIAGNOSIS — Z86.79 STATUS POST CATHETER ABLATION OF SLOW PATHWAY: ICD-10-CM

## 2018-09-11 DIAGNOSIS — Z98.890 STATUS POST CATHETER ABLATION OF SLOW PATHWAY: ICD-10-CM

## 2018-09-11 DIAGNOSIS — M81.0 OSTEOPOROSIS, POST-MENOPAUSAL: Chronic | ICD-10-CM

## 2018-09-11 DIAGNOSIS — E78.2 MIXED HYPERLIPIDEMIA: Chronic | ICD-10-CM

## 2018-09-11 DIAGNOSIS — E05.90 HYPERTHYROIDISM: ICD-10-CM

## 2018-09-11 DIAGNOSIS — I47.10 SUPRAVENTRICULAR TACHYCARDIA: ICD-10-CM

## 2018-09-11 DIAGNOSIS — I87.2 VENOUS INSUFFICIENCY (CHRONIC) (PERIPHERAL): Chronic | ICD-10-CM

## 2018-09-11 DIAGNOSIS — E06.3 HASHIMOTO'S DISEASE: ICD-10-CM

## 2018-09-11 DIAGNOSIS — I70.0 AORTIC ATHEROSCLEROSIS: ICD-10-CM

## 2018-09-11 DIAGNOSIS — I10 ESSENTIAL HYPERTENSION: Chronic | ICD-10-CM

## 2018-09-11 DIAGNOSIS — E10.42 DIABETIC PERIPHERAL NEUROPATHY ASSOCIATED WITH TYPE 1 DIABETES MELLITUS: Primary | ICD-10-CM

## 2018-09-11 LAB
ALBUMIN SERPL BCP-MCNC: 3.9 G/DL
ALP SERPL-CCNC: 78 U/L
ALT SERPL W/O P-5'-P-CCNC: 13 U/L
ANION GAP SERPL CALC-SCNC: 11 MMOL/L
AST SERPL-CCNC: 14 U/L
BILIRUB SERPL-MCNC: 0.5 MG/DL
BUN SERPL-MCNC: 21 MG/DL
CALCIUM SERPL-MCNC: 10.1 MG/DL
CHLORIDE SERPL-SCNC: 104 MMOL/L
CHOLEST SERPL-MCNC: 163 MG/DL
CHOLEST/HDLC SERPL: 2.3 {RATIO}
CO2 SERPL-SCNC: 25 MMOL/L
CREAT SERPL-MCNC: 1 MG/DL
EST. GFR  (AFRICAN AMERICAN): >60 ML/MIN/1.73 M^2
EST. GFR  (NON AFRICAN AMERICAN): 58 ML/MIN/1.73 M^2
GLUCOSE SERPL-MCNC: 147 MG/DL
HDLC SERPL-MCNC: 72 MG/DL
HDLC SERPL: 44.2 %
LDLC SERPL CALC-MCNC: 81.2 MG/DL
NONHDLC SERPL-MCNC: 91 MG/DL
POTASSIUM SERPL-SCNC: 4 MMOL/L
PROT SERPL-MCNC: 7.3 G/DL
SODIUM SERPL-SCNC: 140 MMOL/L
TRIGL SERPL-MCNC: 49 MG/DL

## 2018-09-11 PROCEDURE — 80053 COMPREHEN METABOLIC PANEL: CPT

## 2018-09-11 PROCEDURE — 99999 PR PBB SHADOW E&M-EST. PATIENT-LVL III: CPT | Mod: PBBFAC,,, | Performed by: INTERNAL MEDICINE

## 2018-09-11 PROCEDURE — 36415 COLL VENOUS BLD VENIPUNCTURE: CPT

## 2018-09-11 PROCEDURE — 99214 OFFICE O/P EST MOD 30 MIN: CPT | Mod: S$PBB,,, | Performed by: INTERNAL MEDICINE

## 2018-09-11 PROCEDURE — 3078F DIAST BP <80 MM HG: CPT | Mod: CPTII,,, | Performed by: INTERNAL MEDICINE

## 2018-09-11 PROCEDURE — 3045F PR MOST RECENT HEMOGLOBIN A1C LEVEL 7.0-9.0%: CPT | Mod: CPTII,,, | Performed by: INTERNAL MEDICINE

## 2018-09-11 PROCEDURE — 1101F PT FALLS ASSESS-DOCD LE1/YR: CPT | Mod: CPTII,,, | Performed by: INTERNAL MEDICINE

## 2018-09-11 PROCEDURE — 3074F SYST BP LT 130 MM HG: CPT | Mod: CPTII,,, | Performed by: INTERNAL MEDICINE

## 2018-09-11 PROCEDURE — 99499 UNLISTED E&M SERVICE: CPT | Mod: S$GLB,,, | Performed by: INTERNAL MEDICINE

## 2018-09-11 PROCEDURE — 80061 LIPID PANEL: CPT

## 2018-09-11 PROCEDURE — 99213 OFFICE O/P EST LOW 20 MIN: CPT | Mod: PBBFAC | Performed by: INTERNAL MEDICINE

## 2018-09-11 NOTE — PROGRESS NOTES
Subjective:       Patient ID: Sarita Alvarez is a 68 y.o. female.    Chief Complaint: Diabetes (6 month f/u)    Pt here for f/u. Pt has DM1 and is followed by endocrinology. She is on Tresiba 20 units and novolog 6 units plus SSI per carb counting. Her last A1C was 8.6. She has complications of diabetes that includes neuropathy in feet. This is tolerable without meds. No recent lows. Her fasting sugars are 130s-140s and prandial sugars are under 150. She reports skipping insulin at times if sugar is on low side. She also waits until after eating to give insulin. We again reviewed proper use of insulin.     She has varicose veins that cause some swelling at times but manages with compression hose. Has seen vascular in past.     She also sees endocrinology for hyperthyroidism/hashimotos. She is on tapazole and monitored regularly. She is clinically euthyroid.     Pt's BP is well controlled. Tolerating meds well. Pt denies cp/sob/ha/vision or neuro changes. Checking at home and is well controlled.     HLD is tx with simvastatin which she tolerates well.     Pt has osteoporosis. She has not tolerated actonel due to CP in past, presumably esophagitis. She is not willing to try another bisphosphonate either oral or IV. We discussed prolia but she declines. She understands r/b of this. She is engaged in resistance exercises.      Pt had symptomatic SVT s/p ablation and has not had recurrence.        Diabetes   Pertinent negatives for hypoglycemia include no dizziness or headaches. Pertinent negatives for diabetes include no chest pain and no polyuria.   Medication Refill   Pertinent negatives include no chest pain or headaches.   Hypertension   Pertinent negatives include no chest pain, headaches or shortness of breath.     Review of Systems   Constitutional: Negative for unexpected weight change.   Eyes: Negative for visual disturbance.   Respiratory: Negative for shortness of breath.    Cardiovascular: Negative for  chest pain.   Gastrointestinal: Negative for blood in stool.   Endocrine: Negative for polyuria.   Genitourinary: Negative for frequency.   Neurological: Negative for dizziness, light-headedness and headaches.   Psychiatric/Behavioral: Negative for dysphoric mood.       Objective:          Assessment:       1. Diabetic peripheral neuropathy associated with type 1 diabetes mellitus    2. Aortic atherosclerosis    3. Essential hypertension    4. Mixed hyperlipidemia    5. Supraventricular tachycardia    6. Status post catheter ablation of slow pathway    7. Venous insufficiency (chronic) (peripheral)    8. Hashimoto's disease    9. Hyperthyroidism    10. Osteoporosis, post-menopausal        Plan:       1. Reviewed recent labs; additional labs ordered    2. Keep f/u with endocrinology  3. Keep f/u with specialists  4. Home BP and BS monitoring            Physical Exam   Constitutional: She is oriented to person, place, and time. She appears well-developed and well-nourished.   HENT:   Head: Normocephalic and atraumatic.   Right Ear: Hearing, tympanic membrane and ear canal normal.   Left Ear: Hearing, tympanic membrane and ear canal normal.   Mouth/Throat: No oropharyngeal exudate or posterior oropharyngeal erythema.   Eyes: EOM are normal. Pupils are equal, round, and reactive to light.   Neck: Neck supple. Carotid bruit is not present. No thyroid mass and no thyromegaly present.   Cardiovascular: Normal rate, regular rhythm, S1 normal, S2 normal and normal heart sounds.   No murmur heard.  Pulses:       Radial pulses are 2+ on the right side, and 2+ on the left side.        Posterior tibial pulses are 2+ on the right side, and 2+ on the left side.   Pulmonary/Chest: Effort normal and breath sounds normal. She has no wheezes.   Abdominal: Soft. Bowel sounds are normal. She exhibits no distension and no mass. There is no hepatosplenomegaly. There is no tenderness.   Musculoskeletal: She exhibits no edema.        Right  foot: Normal. There is no deformity.        Left foot: Normal. There is no deformity.   Feet:   Right Foot:   Protective Sensation: 6 sites tested. 6 sites sensed.   Skin Integrity: Negative for ulcer, blister or skin breakdown.   Left Foot:   Protective Sensation: 6 sites tested. 6 sites sensed.   Skin Integrity: Negative for ulcer, blister or skin breakdown.   Lymphadenopathy:     She has no cervical adenopathy.   Neurological: She is alert and oriented to person, place, and time. She has normal strength. No cranial nerve deficit or sensory deficit. She displays a negative Romberg sign. Coordination and gait normal.   Psychiatric: She has a normal mood and affect. Her behavior is normal.

## 2018-09-12 ENCOUNTER — TELEPHONE (OUTPATIENT)
Dept: ENDOCRINOLOGY | Facility: CLINIC | Age: 68
End: 2018-09-12

## 2018-09-12 ENCOUNTER — TELEPHONE (OUTPATIENT)
Dept: INTERNAL MEDICINE | Facility: CLINIC | Age: 68
End: 2018-09-12

## 2018-09-12 NOTE — TELEPHONE ENCOUNTER
Spoke to Ms. Antonio and informed her that her labs looked good and no changes are needed at this time.  Instructed patient to call the office with any questions or concerns.

## 2018-09-12 NOTE — TELEPHONE ENCOUNTER
----- Message from Ivette Sky sent at 9/12/2018  1:57 PM CDT -----  Contact: Self [phone]   PT received test results in the mail and has some questions.

## 2018-09-20 ENCOUNTER — DOCUMENTATION ONLY (OUTPATIENT)
Dept: SURGERY | Facility: CLINIC | Age: 68
End: 2018-09-20

## 2018-09-20 NOTE — PROGRESS NOTES
Spoke with patient and advised of the noted below. She stated she wouldn't have been able to make the appt anyway because she has to get grandchild from school. Will need an earlier appt (after 8A but before noon). Will call back to reschedule.       Greetings,     It is with regret that we inform you of an emergent change to the clinic schedule of Dr. Ronald Koch on Monday, September 24, 2018. Dr. Koch will be unable to see patients in the clinic during your appointed time. Our office will be calling you to reschedule your appointment at a time convenient for you. Since you are scheduled in a specialized clinic (Multidisciplinary [facility]), which is held on Mondays at [facility], we will be rescheduling you into that same clinic on one of the upcoming Mondays, over the next couple of weeks.     We apologize for any inconvenience this may have caused you.     Sincerely,     [facility] Staff   [phone]

## 2018-09-21 ENCOUNTER — TELEPHONE (OUTPATIENT)
Dept: ENDOCRINOLOGY | Facility: CLINIC | Age: 68
End: 2018-09-21

## 2018-09-21 NOTE — TELEPHONE ENCOUNTER
Called pt to reschedule her to see Dr. Koch as a Consult. She advised she's a pt of Dr. Jacob, Endocrinology. Her IM MD, Dr. Ford advised her to see Endo but he wasn't sure if Dr. Jacob was still practicing. The pt was referred to Dr. Koch. She said prefers seeing Dr. Jacob, if he's still practicing and would call his office to schedule. She did not want me to send a message to his office to request an appt. She wanted to call. She was being seen for her thyroid, but she also have DM that's being managed by NPs.

## 2018-11-08 ENCOUNTER — TELEPHONE (OUTPATIENT)
Dept: ENDOCRINOLOGY | Facility: CLINIC | Age: 68
End: 2018-11-08

## 2018-11-08 NOTE — TELEPHONE ENCOUNTER
Called and spoke with patient about her concerns with Tresiba. Patient stated that she can't afford Tresiba because total came up to 136 a month. Patient would like to know can you send a Rx to one pen vs a box til the beginning of the year. I not sure if the pharmacy can do that or not.

## 2018-11-08 NOTE — TELEPHONE ENCOUNTER
----- Message from Ivette Sky sent at 11/8/2018 11:40 AM CST -----  Contact: Self [phone]  PT is not able to afford insulin degludec (TRESIBA FLEXTOUCH U-100) 100 unit/mL (3 mL) InPn. Her insurance company is sending her some forms to get assistance. She wants to know if we have any samples or coupon cards until next month.

## 2018-11-19 DIAGNOSIS — E78.5 HYPERLIPIDEMIA, UNSPECIFIED HYPERLIPIDEMIA TYPE: ICD-10-CM

## 2018-11-19 RX ORDER — SIMVASTATIN 20 MG/1
TABLET, FILM COATED ORAL
Qty: 90 TABLET | Refills: 3 | Status: SHIPPED | OUTPATIENT
Start: 2018-11-19 | End: 2019-11-14 | Stop reason: SDUPTHER

## 2019-01-04 RX ORDER — AMLODIPINE BESYLATE 5 MG/1
5 TABLET ORAL DAILY
Qty: 90 TABLET | Refills: 3 | Status: SHIPPED | OUTPATIENT
Start: 2019-01-04 | End: 2019-12-30 | Stop reason: SDUPTHER

## 2019-01-04 NOTE — TELEPHONE ENCOUNTER
----- Message from Mireya Lamar sent at 1/4/2019  9:32 AM CST -----  Contact: pt   Can the clinic reply in [facility]: no       Please refill the medication(s) listed below. Please call the patient when the prescription(s) is ready for  at the phone number [phone]    Medication #1: amLODIPine (NORVASC) 5 MG tablet    Medication #2:       Preferred Pharmacy:[facility] - [place] [address]

## 2019-01-29 ENCOUNTER — TELEPHONE (OUTPATIENT)
Dept: ENDOCRINOLOGY | Facility: CLINIC | Age: 69
End: 2019-01-29

## 2019-01-29 ENCOUNTER — TELEPHONE (OUTPATIENT)
Dept: OBSTETRICS AND GYNECOLOGY | Facility: CLINIC | Age: 69
End: 2019-01-29

## 2019-01-29 ENCOUNTER — TELEPHONE (OUTPATIENT)
Dept: CARDIOLOGY | Facility: CLINIC | Age: 69
End: 2019-01-29

## 2019-01-29 DIAGNOSIS — E10.9 TYPE 1 DIABETES MELLITUS NOT AT GOAL: ICD-10-CM

## 2019-01-29 DIAGNOSIS — R00.2 PALPITATIONS: Primary | ICD-10-CM

## 2019-01-29 RX ORDER — INSULIN DEGLUDEC 100 U/ML
20 INJECTION, SOLUTION SUBCUTANEOUS NIGHTLY
Qty: 6 ML | Refills: 0 | Status: SHIPPED | OUTPATIENT
Start: 2019-01-29 | End: 2019-01-29

## 2019-01-29 RX ORDER — INSULIN DEGLUDEC 100 U/ML
20 INJECTION, SOLUTION SUBCUTANEOUS NIGHTLY
Qty: 6 ML | Refills: 11 | OUTPATIENT
Start: 2019-01-29

## 2019-01-29 NOTE — TELEPHONE ENCOUNTER
----- Message from Olena Correa sent at 1/29/2019 11:25 AM CST -----  Contact: pt  OUT OF INSULIN       Just  Wants 1 Flextouch at a time     Cannot afford a box       insulin degludec (TRESIBA FLEXTOUCH U-100) 100 unit/mL (3 mL) InPn    [facility]      [place]        [place]     Thanks

## 2019-01-29 NOTE — TELEPHONE ENCOUNTER
----- Message from Maame Pendleton sent at 1/29/2019 11:37 AM CST -----  Contact: Pt   Can the clinic reply in [facility]: No     Please refill the medication(s) listed below. Please call the patient when the prescription(s) is ready for  at the phone number [phone]    Medication #1: insulin degludec (TRESIBA FLEXTOUCH U-100) 100 unit/mL (3 mL) InPn    Medication #2:    Preferred Pharmacy: [facility] [address]

## 2019-01-29 NOTE — TELEPHONE ENCOUNTER
----- Message from Leyda Moscoso sent at 1/29/2019 11:11 AM CST -----  Please put EKG order in the computer and schedule.Advised the pt to come @ 9:00 for EKG on 4/16/19. Thank you.

## 2019-01-29 NOTE — TELEPHONE ENCOUNTER
----- Message from Quin Harman MA sent at 1/29/2019  4:29 PM CST -----  Contact: pt      ----- Message -----  From: Olena Correa  Sent: 1/29/2019  11:25 AM  To: Priy[facility] Staff    OUT OF INSULIN       Just  Wants 1 Flextouch at a time     Cannot afford a box       insulin degludec (TRESIBA FLEXTOUCH U-100) 100 unit/mL (3 mL) In    [facility]      [place]        [place]     Thanks

## 2019-02-01 NOTE — TELEPHONE ENCOUNTER
----- Message from Olena Corera sent at 2/1/2019  4:20 PM CST -----  Contact: pt  In regards to insulin  levemir    Said she's not on levemir     Please call   [phone]    Thanks        _Below is a message sent on 1/29___  Olena Correa sent to IRASEMA [facility] Staff  Caller: pt (3 days ago, 11:25 AM)         OUT OF INSULIN       Just  Wants 1 Flextouch at a time     Cannot afford a box       insulin degludec (TRESIBA FLEXTOUCH U-100) 100 unit/mL (3 mL) In     [facility]      [place]        [place]     Thanks

## 2019-02-04 RX ORDER — INSULIN DEGLUDEC 100 U/ML
INJECTION, SOLUTION SUBCUTANEOUS
Qty: 9 ML | Refills: 6 | Status: SHIPPED | OUTPATIENT
Start: 2019-02-04 | End: 2019-03-11

## 2019-02-06 ENCOUNTER — LAB VISIT (OUTPATIENT)
Dept: LAB | Facility: OTHER | Age: 69
End: 2019-02-06
Attending: INTERNAL MEDICINE
Payer: MEDICARE

## 2019-02-06 ENCOUNTER — TELEPHONE (OUTPATIENT)
Dept: INTERNAL MEDICINE | Facility: CLINIC | Age: 69
End: 2019-02-06

## 2019-02-06 ENCOUNTER — OFFICE VISIT (OUTPATIENT)
Dept: INTERNAL MEDICINE | Facility: CLINIC | Age: 69
End: 2019-02-06
Payer: MEDICARE

## 2019-02-06 VITALS
WEIGHT: 121.94 LBS | SYSTOLIC BLOOD PRESSURE: 108 MMHG | BODY MASS INDEX: 20.82 KG/M2 | HEIGHT: 64 IN | DIASTOLIC BLOOD PRESSURE: 60 MMHG | HEART RATE: 63 BPM

## 2019-02-06 DIAGNOSIS — I10 ESSENTIAL HYPERTENSION: Primary | Chronic | ICD-10-CM

## 2019-02-06 DIAGNOSIS — I47.10 SUPRAVENTRICULAR TACHYCARDIA: ICD-10-CM

## 2019-02-06 DIAGNOSIS — I87.2 VENOUS INSUFFICIENCY (CHRONIC) (PERIPHERAL): Chronic | ICD-10-CM

## 2019-02-06 DIAGNOSIS — E10.42 DIABETIC PERIPHERAL NEUROPATHY ASSOCIATED WITH TYPE 1 DIABETES MELLITUS: ICD-10-CM

## 2019-02-06 DIAGNOSIS — E78.2 MIXED HYPERLIPIDEMIA: Chronic | ICD-10-CM

## 2019-02-06 DIAGNOSIS — M81.0 OSTEOPOROSIS, POST-MENOPAUSAL: Chronic | ICD-10-CM

## 2019-02-06 DIAGNOSIS — E05.90 HYPERTHYROIDISM: ICD-10-CM

## 2019-02-06 DIAGNOSIS — E06.3 HASHIMOTO'S DISEASE: ICD-10-CM

## 2019-02-06 DIAGNOSIS — I70.0 AORTIC ATHEROSCLEROSIS: ICD-10-CM

## 2019-02-06 LAB
ESTIMATED AVG GLUCOSE: 217 MG/DL
HBA1C MFR BLD HPLC: 9.2 %

## 2019-02-06 PROCEDURE — 83036 HEMOGLOBIN GLYCOSYLATED A1C: CPT

## 2019-02-06 PROCEDURE — 3074F SYST BP LT 130 MM HG: CPT | Mod: CPTII,S$GLB,, | Performed by: INTERNAL MEDICINE

## 2019-02-06 PROCEDURE — 1101F PR PT FALLS ASSESS DOC 0-1 FALLS W/OUT INJ PAST YR: ICD-10-PCS | Mod: CPTII,S$GLB,, | Performed by: INTERNAL MEDICINE

## 2019-02-06 PROCEDURE — 99999 PR PBB SHADOW E&M-EST. PATIENT-LVL III: CPT | Mod: PBBFAC,,, | Performed by: INTERNAL MEDICINE

## 2019-02-06 PROCEDURE — 36415 COLL VENOUS BLD VENIPUNCTURE: CPT

## 2019-02-06 PROCEDURE — 3074F PR MOST RECENT SYSTOLIC BLOOD PRESSURE < 130 MM HG: ICD-10-PCS | Mod: CPTII,S$GLB,, | Performed by: INTERNAL MEDICINE

## 2019-02-06 PROCEDURE — 3078F PR MOST RECENT DIASTOLIC BLOOD PRESSURE < 80 MM HG: ICD-10-PCS | Mod: CPTII,S$GLB,, | Performed by: INTERNAL MEDICINE

## 2019-02-06 PROCEDURE — 1101F PT FALLS ASSESS-DOCD LE1/YR: CPT | Mod: CPTII,S$GLB,, | Performed by: INTERNAL MEDICINE

## 2019-02-06 PROCEDURE — 3045F PR MOST RECENT HEMOGLOBIN A1C LEVEL 7.0-9.0%: ICD-10-PCS | Mod: CPTII,S$GLB,, | Performed by: INTERNAL MEDICINE

## 2019-02-06 PROCEDURE — 99214 OFFICE O/P EST MOD 30 MIN: CPT | Mod: S$GLB,,, | Performed by: INTERNAL MEDICINE

## 2019-02-06 PROCEDURE — 99499 UNLISTED E&M SERVICE: CPT | Mod: S$GLB,,, | Performed by: INTERNAL MEDICINE

## 2019-02-06 PROCEDURE — 3078F DIAST BP <80 MM HG: CPT | Mod: CPTII,S$GLB,, | Performed by: INTERNAL MEDICINE

## 2019-02-06 PROCEDURE — 99214 PR OFFICE/OUTPT VISIT, EST, LEVL IV, 30-39 MIN: ICD-10-PCS | Mod: S$GLB,,, | Performed by: INTERNAL MEDICINE

## 2019-02-06 PROCEDURE — 3045F PR MOST RECENT HEMOGLOBIN A1C LEVEL 7.0-9.0%: CPT | Mod: CPTII,S$GLB,, | Performed by: INTERNAL MEDICINE

## 2019-02-06 PROCEDURE — 99999 PR PBB SHADOW E&M-EST. PATIENT-LVL III: ICD-10-PCS | Mod: PBBFAC,,, | Performed by: INTERNAL MEDICINE

## 2019-02-06 PROCEDURE — 99499 RISK ADDL DX/OHS AUDIT: ICD-10-PCS | Mod: S$GLB,,, | Performed by: INTERNAL MEDICINE

## 2019-02-06 NOTE — PROGRESS NOTES
Subjective:       Patient ID: Sarita Alvarez is a 69 y.o. female.    Chief Complaint: Diabetes    Pt here for f/u. Pt has DM1 and is followed by endocrinology. She is on Tresiba 15 units and novolog 6 units plus SSI per carb counting. Her last A1C was 8.6. She has complications of diabetes that includes neuropathy in feet. This is tolerable without meds. No recent lows. Her fasting sugars are 120s and prandial sugars are under 150. She reports skipping insulin at times if sugar is on low side.      She has varicose veins that cause some swelling at times but manages with compression hose. Has seen vascular in past.     She also sees endocrinology for hyperthyroidism/hashimotos. She is on tapazole and monitored regularly. She is clinically euthyroid.     Pt's BP is well controlled. Tolerating meds well. Pt denies cp/sob/ha/vision or neuro changes. Checking at home and is well controlled.     HLD is tx with simvastatin which she tolerates well. She has aortic atherosclerosis and is on appropriate risk mitigation tx.    Pt has osteoporosis. She has not tolerated actonel due to CP in past, presumably esophagitis. She is not willing to try another bisphosphonate either oral or IV. We discussed prolia but she declines. She understands r/b of this. She is engaged in resistance exercises.      Pt had symptomatic SVT s/p ablation and has not had recurrence.        Diabetes   Pertinent negatives for hypoglycemia include no dizziness or headaches. Pertinent negatives for diabetes include no chest pain and no polyuria.   Medication Refill   Pertinent negatives include no chest pain or headaches.   Hypertension   Pertinent negatives include no chest pain, headaches or shortness of breath.     Review of Systems   Constitutional: Negative for unexpected weight change.   Eyes: Negative for visual disturbance.   Respiratory: Negative for shortness of breath.    Cardiovascular: Negative for chest pain.   Gastrointestinal:  Negative for blood in stool.   Endocrine: Negative for polyuria.   Genitourinary: Negative for frequency.   Neurological: Negative for dizziness, light-headedness and headaches.   Psychiatric/Behavioral: Negative for dysphoric mood.       Objective:          Assessment:       1. Essential hypertension    2. Aortic atherosclerosis    3. Diabetic peripheral neuropathy associated with type 1 diabetes mellitus    4. Mixed hyperlipidemia    5. Supraventricular tachycardia    6. Venous insufficiency (chronic) (peripheral)    7. Hyperthyroidism    8. Hashimoto's disease    9. Osteoporosis, post-menopausal        Plan:       1. Reviewed recent labs; update A1c     2. Keep f/u with endocrinology  3. Keep f/u with specialists  4. Home BP and BS monitoring            Physical Exam   Constitutional: She is oriented to person, place, and time. She appears well-developed and well-nourished.   HENT:   Head: Normocephalic and atraumatic.   Right Ear: Hearing, tympanic membrane and ear canal normal.   Left Ear: Hearing, tympanic membrane and ear canal normal.   Mouth/Throat: No oropharyngeal exudate or posterior oropharyngeal erythema.   Eyes: EOM are normal. Pupils are equal, round, and reactive to light.   Neck: Neck supple. Carotid bruit is not present. No thyroid mass and no thyromegaly present.   Cardiovascular: Normal rate, regular rhythm, S1 normal, S2 normal and normal heart sounds.   No murmur heard.  Pulses:       Radial pulses are 2+ on the right side, and 2+ on the left side.   Pulmonary/Chest: Effort normal and breath sounds normal. She has no wheezes.   Abdominal: Soft. Bowel sounds are normal. She exhibits no distension and no mass. There is no hepatosplenomegaly. There is no tenderness.   Musculoskeletal: She exhibits no edema.        Right foot: Normal.        Left foot: Normal.   Lymphadenopathy:     She has no cervical adenopathy.   Neurological: She is alert and oriented to person, place, and time. She has normal  strength. No cranial nerve deficit or sensory deficit. She displays a negative Romberg sign. Coordination and gait normal.   Psychiatric: She has a normal mood and affect. Her behavior is normal.

## 2019-02-07 NOTE — TELEPHONE ENCOUNTER
Pt expressed verbal understanding concerning lab results. Scheduled 2-3 week appointment with pt. Pt also advised that she will f/u with Bucky Yost in [facility]. CF

## 2019-02-07 NOTE — TELEPHONE ENCOUNTER
Inform pt that A1c increased. It is imperative that she keep a written sugar log and f/u with me in 2-3 weeks with log. She also needs to see endocrinology.

## 2019-02-08 ENCOUNTER — IMMUNIZATION (OUTPATIENT)
Dept: PHARMACY | Facility: CLINIC | Age: 69
End: 2019-02-08
Payer: MEDICARE

## 2019-02-10 DIAGNOSIS — I10 HYPERTENSION, UNSPECIFIED TYPE: ICD-10-CM

## 2019-02-10 DIAGNOSIS — E10.40 DIABETIC NEUROPATHY, TYPE I DIABETES MELLITUS: ICD-10-CM

## 2019-02-11 RX ORDER — INSULIN ASPART 100 [IU]/ML
INJECTION, SOLUTION INTRAVENOUS; SUBCUTANEOUS
Qty: 30 ML | Refills: 3 | Status: SHIPPED | OUTPATIENT
Start: 2019-02-11 | End: 2020-02-14 | Stop reason: SDUPTHER

## 2019-02-11 RX ORDER — ENALAPRIL MALEATE 20 MG/1
TABLET ORAL
Qty: 90 TABLET | Refills: 3 | Status: SHIPPED | OUTPATIENT
Start: 2019-02-11 | End: 2020-02-06

## 2019-02-11 NOTE — TELEPHONE ENCOUNTER
----- Message from Julia Vick sent at 2/11/2019  9:43 AM CST -----  Contact: LICHA CHENEY [[id number]]                                                   MEDICATION  REFILL  REQUEST      Please refill the medication(s) listed below. Please call the patient when the prescription(s) is ready for  at this phone number   LICHA CHENEY / # [phone]       Medication #1  enalapril (VASOTEC) 20 MG tablet    Medication #2  insulin aspart (NOVOLOG FLEXPEN) 100 unit/mL InPn pen      Preferred Pharmacy:  [facility] [address] [place] [address] [address]     [phone] (Phone)  [phone] (Fax)

## 2019-02-12 RX ORDER — FLUTICASONE PROPIONATE 50 MCG
SPRAY, SUSPENSION (ML) NASAL
Qty: 16 G | Refills: 1 | Status: SHIPPED | OUTPATIENT
Start: 2019-02-12

## 2019-02-15 ENCOUNTER — OFFICE VISIT (OUTPATIENT)
Dept: OBSTETRICS AND GYNECOLOGY | Facility: CLINIC | Age: 69
End: 2019-02-15
Attending: OBSTETRICS & GYNECOLOGY
Payer: MEDICARE

## 2019-02-15 VITALS
DIASTOLIC BLOOD PRESSURE: 64 MMHG | WEIGHT: 121.25 LBS | HEIGHT: 64 IN | SYSTOLIC BLOOD PRESSURE: 106 MMHG | BODY MASS INDEX: 20.7 KG/M2

## 2019-02-15 DIAGNOSIS — Z01.419 WELL WOMAN EXAM WITH ROUTINE GYNECOLOGICAL EXAM: Primary | ICD-10-CM

## 2019-02-15 DIAGNOSIS — Z12.31 ENCOUNTER FOR SCREENING MAMMOGRAM FOR BREAST CANCER: ICD-10-CM

## 2019-02-15 DIAGNOSIS — Z78.0 POSTMENOPAUSAL STATUS: ICD-10-CM

## 2019-02-15 PROCEDURE — G0101 PR CA SCREEN;PELVIC/BREAST EXAM: ICD-10-PCS | Mod: S$GLB,,, | Performed by: OBSTETRICS & GYNECOLOGY

## 2019-02-15 PROCEDURE — 99999 PR PBB SHADOW E&M-EST. PATIENT-LVL III: ICD-10-PCS | Mod: PBBFAC,,, | Performed by: OBSTETRICS & GYNECOLOGY

## 2019-02-15 PROCEDURE — G0101 CA SCREEN;PELVIC/BREAST EXAM: HCPCS | Mod: S$GLB,,, | Performed by: OBSTETRICS & GYNECOLOGY

## 2019-02-15 PROCEDURE — 99999 PR PBB SHADOW E&M-EST. PATIENT-LVL III: CPT | Mod: PBBFAC,,, | Performed by: OBSTETRICS & GYNECOLOGY

## 2019-02-15 NOTE — PROGRESS NOTES
Sarita Alvarez is a 69 y.o. year old  female who presents for routine GYN exam.  She is postmenopausal, not on HRT.  Denies bleeding.  Reports occasional night sweats.  She is trying to improve her glycemic control - last HgbA1c on 19 was 9.2.  No GYN complaints.    Pap 2018: Negative     Past Medical History:   Diagnosis Date    Diabetes mellitus type I     Diabetic peripheral neuropathy 2012    HTN (hypertension) 2012    Hyperlipidemia 2012    Osteoporosis, post-menopausal 2012    Poorly controlled type 1 diabetes mellitus with peripheral neuropathy 2012    Supraventricular tachycardia 14    [facility]    SVT (supraventricular tachycardia) 2012    Thyroid disease     hyperthyroidism    Venous insufficiency (chronic) (peripheral) 2012       Past Surgical History:   Procedure Laterality Date    ABLATION Additional Codes: 76917 N/A 2014    Performed by [facility] at [facility]    BREAST BIOPSY Left 2016    COLONOSCOPY  2016    DILATION AND CURETTAGE OF UTERUS      RADIOFREQUENCY ABLATION  14    [facility]    WISDOM TOOTH EXTRACTION         OB History      Para Term  AB Living    3 3 3     3    SAB TAB Ectopic Multiple Live Births                         ROS:  GENERAL: Feeling well overall.   SKIN: Denies rash or lesions.   HEAD: Reports nasal congestion.  NODES: Denies enlarged lymph nodes.   CHEST: Denies chest pain or shortness of breath.   CARDIOVASCULAR: Denies palpitations or left sided chest pain.   ABDOMEN: No abdominal pain, nausea, vomiting or rectal bleeding.   URINARY: No dysuria or hematuria.  REPRODUCTIVE: See HPI.   BREASTS: Denies pain, lumps, or nipple discharge.   HEMATOLOGIC: No easy bruisability or excessive bleeding.   MUSCULOSKELETAL: Denies joint pain.  NEUROLOGIC: Denies syncope or weakness.   PSYCHIATRIC: Denies depression.     PE:   (chaperone present during entire exam)  APPEARANCE: Well  nourished, well developed, in no acute distress.  BREASTS: Symmetrical, no skin changes or visible lesions. No palpable masses, nipple discharge or adenopathy bilaterally.  ABDOMEN: Soft. No tenderness or masses. No CVA tenderness.  VULVA: No lesions. Normal female genitalia.  URETHRAL MEATUS: Normal size and location, no lesions, no prolapse.  URETHRA: No masses, tenderness, prolapse or scarring.  VAGINA: Atrophic.  No lesions, no discharge, no significant cystocele or rectocele.  CERVIX: No lesions and discharge. PAP done.  UTERUS: Normal size, regular shape, mobile, non-tender, bladder base nontender.  ADNEXA: No masses, tenderness or CDS nodularity.  ANUS PERINEUM: Normal.    Diagnosis:  1. Well woman exam with routine gynecological exam    2. Postmenopausal status    3. Encounter for screening mammogram for breast cancer          PLAN:    Orders Placed This Encounter    Mammo Digital Screening Bilat w/ Aaron       Patient was counseled today on postmenopausal issues.    Follow-up in 1 year.

## 2019-02-19 ENCOUNTER — HOSPITAL ENCOUNTER (OUTPATIENT)
Dept: RADIOLOGY | Facility: HOSPITAL | Age: 69
Discharge: HOME | End: 2019-02-19
Attending: OBSTETRICS & GYNECOLOGY
Payer: MEDICARE

## 2019-02-19 DIAGNOSIS — Z12.31 ENCOUNTER FOR SCREENING MAMMOGRAM FOR BREAST CANCER: ICD-10-CM

## 2019-02-19 PROCEDURE — 77063 MAMMO DIGITAL SCREENING BILAT WITH TOMOSYNTHESIS_CAD: ICD-10-PCS | Mod: 26,,, | Performed by: RADIOLOGY

## 2019-02-19 PROCEDURE — 77067 MAMMO DIGITAL SCREENING BILAT WITH TOMOSYNTHESIS_CAD: ICD-10-PCS | Mod: 26,,, | Performed by: RADIOLOGY

## 2019-02-19 PROCEDURE — 77067 SCR MAMMO BI INCL CAD: CPT | Mod: TC

## 2019-02-19 PROCEDURE — 77063 BREAST TOMOSYNTHESIS BI: CPT | Mod: 26,,, | Performed by: RADIOLOGY

## 2019-02-19 PROCEDURE — 77067 SCR MAMMO BI INCL CAD: CPT | Mod: 26,,, | Performed by: RADIOLOGY

## 2019-02-27 ENCOUNTER — OFFICE VISIT (OUTPATIENT)
Dept: INTERNAL MEDICINE | Facility: CLINIC | Age: 69
End: 2019-02-27
Payer: MEDICARE

## 2019-02-27 VITALS
WEIGHT: 120.81 LBS | HEART RATE: 75 BPM | HEIGHT: 64 IN | SYSTOLIC BLOOD PRESSURE: 120 MMHG | DIASTOLIC BLOOD PRESSURE: 70 MMHG | BODY MASS INDEX: 20.63 KG/M2

## 2019-02-27 DIAGNOSIS — E10.42 DIABETIC PERIPHERAL NEUROPATHY ASSOCIATED WITH TYPE 1 DIABETES MELLITUS: Primary | ICD-10-CM

## 2019-02-27 PROCEDURE — 3078F DIAST BP <80 MM HG: CPT | Mod: CPTII,S$GLB,, | Performed by: INTERNAL MEDICINE

## 2019-02-27 PROCEDURE — 3074F SYST BP LT 130 MM HG: CPT | Mod: CPTII,S$GLB,, | Performed by: INTERNAL MEDICINE

## 2019-02-27 PROCEDURE — 99499 RISK ADDL DX/OHS AUDIT: ICD-10-PCS | Mod: S$GLB,,, | Performed by: INTERNAL MEDICINE

## 2019-02-27 PROCEDURE — 1101F PR PT FALLS ASSESS DOC 0-1 FALLS W/OUT INJ PAST YR: ICD-10-PCS | Mod: CPTII,S$GLB,, | Performed by: INTERNAL MEDICINE

## 2019-02-27 PROCEDURE — 99999 PR PBB SHADOW E&M-EST. PATIENT-LVL III: ICD-10-PCS | Mod: PBBFAC,,, | Performed by: INTERNAL MEDICINE

## 2019-02-27 PROCEDURE — 1101F PT FALLS ASSESS-DOCD LE1/YR: CPT | Mod: CPTII,S$GLB,, | Performed by: INTERNAL MEDICINE

## 2019-02-27 PROCEDURE — 99999 PR PBB SHADOW E&M-EST. PATIENT-LVL III: CPT | Mod: PBBFAC,,, | Performed by: INTERNAL MEDICINE

## 2019-02-27 PROCEDURE — 99213 OFFICE O/P EST LOW 20 MIN: CPT | Mod: S$GLB,,, | Performed by: INTERNAL MEDICINE

## 2019-02-27 PROCEDURE — 3074F PR MOST RECENT SYSTOLIC BLOOD PRESSURE < 130 MM HG: ICD-10-PCS | Mod: CPTII,S$GLB,, | Performed by: INTERNAL MEDICINE

## 2019-02-27 PROCEDURE — 99213 PR OFFICE/OUTPT VISIT, EST, LEVL III, 20-29 MIN: ICD-10-PCS | Mod: S$GLB,,, | Performed by: INTERNAL MEDICINE

## 2019-02-27 PROCEDURE — 3046F HEMOGLOBIN A1C LEVEL >9.0%: CPT | Mod: CPTII,S$GLB,, | Performed by: INTERNAL MEDICINE

## 2019-02-27 PROCEDURE — 99499 UNLISTED E&M SERVICE: CPT | Mod: S$GLB,,, | Performed by: INTERNAL MEDICINE

## 2019-02-27 PROCEDURE — 3078F PR MOST RECENT DIASTOLIC BLOOD PRESSURE < 80 MM HG: ICD-10-PCS | Mod: CPTII,S$GLB,, | Performed by: INTERNAL MEDICINE

## 2019-02-27 PROCEDURE — 3046F PR MOST RECENT HEMOGLOBIN A1C LEVEL > 9.0%: ICD-10-PCS | Mod: CPTII,S$GLB,, | Performed by: INTERNAL MEDICINE

## 2019-02-27 NOTE — PROGRESS NOTES
Subjective:       Patient ID: Sarita Alvarez is a 69 y.o. female.    Chief Complaint: Diabetes (f/u)    Pt here for f/u. Pt has DM1 and is followed by endocrinology. She is on Tresiba 15 units and novolog 4-6 units plus SSI per carb counting. Her last A1C was 9.2. She has complications of diabetes that includes neuropathy in feet. This is tolerable without meds. No recent lows. Her fasting sugars are 120s and prandial sugars are under 150 with excursions higher but no lows. She is still fearful of hypoglycemia so underdoses insulin at times.       Diabetes   Pertinent negatives for hypoglycemia include no dizziness. Pertinent negatives for diabetes include no chest pain and no polyuria.     Review of Systems   Constitutional: Negative for unexpected weight change.   Eyes: Negative for visual disturbance.   Respiratory: Negative for shortness of breath.    Cardiovascular: Negative for chest pain.   Gastrointestinal: Negative for blood in stool.   Endocrine: Negative for polyuria.   Genitourinary: Negative for frequency.   Neurological: Negative for dizziness and light-headedness.   Psychiatric/Behavioral: Negative for dysphoric mood.       Objective:          Assessment:       1. Diabetic peripheral neuropathy associated with type 1 diabetes mellitus        Plan:       1. Encouraged pt to follow carb-counting rules given by endocrinology and dose insulin appropriately         2. Keep f/u with endocrinology  3. Home BS monitoring  4. Reviewed hypoglycemia plan            Physical Exam   Constitutional: She is oriented to person, place, and time. She appears well-developed and well-nourished.   Neck: Neck supple. No thyromegaly present.   Cardiovascular: Normal rate, regular rhythm and normal heart sounds.   Pulmonary/Chest: Effort normal and breath sounds normal.   Musculoskeletal: She exhibits no edema.   Lymphadenopathy:     She has no cervical adenopathy.   Neurological: She is alert and oriented to person,  place, and time. No cranial nerve deficit.   Psychiatric: She has a normal mood and affect. Her behavior is normal.

## 2019-03-11 DIAGNOSIS — E10.9 TYPE 1 DIABETES MELLITUS NOT AT GOAL: ICD-10-CM

## 2019-03-11 RX ORDER — INSULIN DEGLUDEC 100 U/ML
15 INJECTION, SOLUTION SUBCUTANEOUS NIGHTLY
Qty: 6 ML | Refills: 2 | Status: SHIPPED | OUTPATIENT
Start: 2019-03-11 | End: 2019-07-10 | Stop reason: SDUPTHER

## 2019-03-14 ENCOUNTER — TELEPHONE (OUTPATIENT)
Dept: INTERNAL MEDICINE | Facility: CLINIC | Age: 69
End: 2019-03-14

## 2019-03-14 NOTE — TELEPHONE ENCOUNTER
----- Message from Yris Schaefer sent at 3/14/2019  1:22 PM CDT -----  Contact: Self            Name of Who is Calling: LICHA CHENEY [[id number]]      What is the request in detail: Pt states she is experiencing the feeling of a pulled muscle in her shoulder. Pt also believes her sinuses are dripping into her ear. Pt states when she moves her neck she gets some pain. Pt is requesting to be advised by the clinical staff.       Can the clinic reply by [facility]: N      What Number to Call Back if not in [facility]: [phone]

## 2019-03-15 ENCOUNTER — TELEPHONE (OUTPATIENT)
Dept: INTERNAL MEDICINE | Facility: CLINIC | Age: 69
End: 2019-03-15

## 2019-03-15 NOTE — TELEPHONE ENCOUNTER
Pt c/o shoulder and ear paint, right side. Pt has s scheduled  appointment with Dr. Ibarra @1:40 am. Pt requesting to speak with Dr. Ford concerning symptoms.  CF

## 2019-03-16 ENCOUNTER — OFFICE VISIT (OUTPATIENT)
Dept: INTERNAL MEDICINE | Facility: CLINIC | Age: 69
End: 2019-03-16
Payer: MEDICARE

## 2019-03-16 VITALS
WEIGHT: 118.63 LBS | SYSTOLIC BLOOD PRESSURE: 120 MMHG | DIASTOLIC BLOOD PRESSURE: 68 MMHG | HEART RATE: 64 BPM | OXYGEN SATURATION: 99 % | TEMPERATURE: 98 F | BODY MASS INDEX: 20.36 KG/M2

## 2019-03-16 DIAGNOSIS — M81.0 OSTEOPOROSIS, POST-MENOPAUSAL: Chronic | ICD-10-CM

## 2019-03-16 DIAGNOSIS — S16.1XXA STRAIN OF NECK MUSCLE, INITIAL ENCOUNTER: Primary | ICD-10-CM

## 2019-03-16 DIAGNOSIS — G89.29 CHRONIC RIGHT SHOULDER PAIN: ICD-10-CM

## 2019-03-16 DIAGNOSIS — J00 ACUTE NASOPHARYNGITIS: ICD-10-CM

## 2019-03-16 DIAGNOSIS — I10 ESSENTIAL HYPERTENSION: Chronic | ICD-10-CM

## 2019-03-16 DIAGNOSIS — M25.511 CHRONIC RIGHT SHOULDER PAIN: ICD-10-CM

## 2019-03-16 PROBLEM — H92.09 OTALGIA: Status: ACTIVE | Noted: 2019-03-16

## 2019-03-16 PROBLEM — M54.2 NECK PAIN: Status: ACTIVE | Noted: 2019-03-16

## 2019-03-16 PROCEDURE — 1101F PT FALLS ASSESS-DOCD LE1/YR: CPT | Mod: CPTII,S$GLB,, | Performed by: INTERNAL MEDICINE

## 2019-03-16 PROCEDURE — 1101F PR PT FALLS ASSESS DOC 0-1 FALLS W/OUT INJ PAST YR: ICD-10-PCS | Mod: CPTII,S$GLB,, | Performed by: INTERNAL MEDICINE

## 2019-03-16 PROCEDURE — 99999 PR PBB SHADOW E&M-EST. PATIENT-LVL IV: ICD-10-PCS | Mod: PBBFAC,,, | Performed by: INTERNAL MEDICINE

## 2019-03-16 PROCEDURE — 3078F PR MOST RECENT DIASTOLIC BLOOD PRESSURE < 80 MM HG: ICD-10-PCS | Mod: CPTII,S$GLB,, | Performed by: INTERNAL MEDICINE

## 2019-03-16 PROCEDURE — 99499 UNLISTED E&M SERVICE: CPT | Mod: S$GLB,,, | Performed by: INTERNAL MEDICINE

## 2019-03-16 PROCEDURE — 3078F DIAST BP <80 MM HG: CPT | Mod: CPTII,S$GLB,, | Performed by: INTERNAL MEDICINE

## 2019-03-16 PROCEDURE — 99215 OFFICE O/P EST HI 40 MIN: CPT | Mod: S$GLB,,, | Performed by: INTERNAL MEDICINE

## 2019-03-16 PROCEDURE — 99215 PR OFFICE/OUTPT VISIT, EST, LEVL V, 40-54 MIN: ICD-10-PCS | Mod: S$GLB,,, | Performed by: INTERNAL MEDICINE

## 2019-03-16 PROCEDURE — 99999 PR PBB SHADOW E&M-EST. PATIENT-LVL IV: CPT | Mod: PBBFAC,,, | Performed by: INTERNAL MEDICINE

## 2019-03-16 PROCEDURE — 3074F PR MOST RECENT SYSTOLIC BLOOD PRESSURE < 130 MM HG: ICD-10-PCS | Mod: CPTII,S$GLB,, | Performed by: INTERNAL MEDICINE

## 2019-03-16 PROCEDURE — 3074F SYST BP LT 130 MM HG: CPT | Mod: CPTII,S$GLB,, | Performed by: INTERNAL MEDICINE

## 2019-03-16 PROCEDURE — 99499 RISK ADDL DX/OHS AUDIT: ICD-10-PCS | Mod: S$GLB,,, | Performed by: INTERNAL MEDICINE

## 2019-03-16 RX ORDER — MELOXICAM 7.5 MG/1
7.5 TABLET ORAL DAILY
Qty: 7 TABLET | Refills: 0 | Status: SHIPPED | OUTPATIENT
Start: 2019-03-16 | End: 2019-03-23

## 2019-03-16 NOTE — PROGRESS NOTES
Subjective:       Patient ID: Sarita Alvarez is a 69 y.o. female who  has a past medical history of Diabetes mellitus type I, Diabetic peripheral neuropathy (7/9/2012), HTN (hypertension) (7/9/2012), Hyperlipidemia (7/9/2012), Osteoporosis, post-menopausal (7/9/2012), Poorly controlled type 1 diabetes mellitus with peripheral neuropathy (7/9/2012), Supraventricular tachycardia (4/26/14), SVT (supraventricular tachycardia) (7/9/2012), Thyroid disease, and Venous insufficiency (chronic) (peripheral) (7/9/2012).    Chief Complaint: Follow-up and Neck Pain (spreads from face around ear to shoulder (started monday))      HPI    History was obtained from the patient and supplemented through chart review  She is a patient of my colleague, Dr. Ford.  Was last seen last month for DM 1    Neck pain:  About a week ago, started to have achy pain at the right posterior occiput along the trapezius into her right shoulder.  Is worsened with head movement, flexion, extension, and turning side to side.  Denies paresthesias.  Denies head, neck injury.  Has worsened right shoulder pain when walking her dog and it tugs on the leash.  In addition, has worsened pain when manually rolling down her non automatic car window to open the door since her door handle broke.  She has also been told in the past to avoid wearing a heavy purse.  Took Tylenol b.i.d. yesterday and felt better this morning.    URI:  Four days ago, Has had right-sided nasal congestion with clear rhinorrhea.  Has postnasal drip and slight sore throat.  Reports posterior occipital head pressure.  Denies headache, photophobia, nausea, vomiting, recent travel, fever, body aches.  Was prescribed Flonase next last month but has not been using it correctly.    HTN:  Pt's BP is well controlled on Norvasc 5, HCTZ 25, Toprol 25. Tolerating meds well.     Osteoporosis:  Vitamin-D level normal.  She is taking a vitamin-D and calcium supplement.  Is doing resistance  exercises and did not want to take a bisphosphonate.    Review of Systems   Constitutional: Negative for fever and unexpected weight change.   HENT: Positive for congestion, postnasal drip, rhinorrhea, sinus pressure and sore throat.    Eyes: Negative for redness and itching.   Respiratory: Negative for cough, shortness of breath and wheezing.    Cardiovascular: Negative for chest pain and palpitations.   Gastrointestinal: Negative for abdominal pain and blood in stool.   Genitourinary: Negative for dysuria and hematuria.   Musculoskeletal: Positive for arthralgias and neck pain.   Skin: Negative for color change and rash.   Neurological: Negative for numbness and headaches.   Hematological: Negative for adenopathy.   Psychiatric/Behavioral: Negative for self-injury. The patient is not nervous/anxious.        Past Medical History:   Diagnosis Date    Diabetes mellitus type I     Diabetic peripheral neuropathy 7/9/2012    HTN (hypertension) 7/9/2012    Hyperlipidemia 7/9/2012    Osteoporosis, post-menopausal 7/9/2012    Poorly controlled type 1 diabetes mellitus with peripheral neuropathy 7/9/2012    Supraventricular tachycardia 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    SVT (supraventricular tachycardia) 7/9/2012    Thyroid disease     hyperthyroidism    Venous insufficiency (chronic) (peripheral) 7/9/2012     Past Surgical History:   Procedure Laterality Date    ABLATION Additional Codes: 33922 N/A 6/17/2014    Performed by Eliezer Walter MD at [facility] CATH LAB    BREAST BIOPSY Left 12/23/2016    core    COLONOSCOPY  11/28/2016    DILATION AND CURETTAGE OF UTERUS      RADIOFREQUENCY ABLATION  6/17/14    AVNRT    WISDOM TOOTH EXTRACTION       Family History   Problem Relation Age of Onset    Heart disease Mother         valvular heart disease at 91    Hypertension Mother     Cataracts Mother     Diabetes Mother     COPD Father     Glaucoma Father     Glaucoma Sister     No Known Problems Brother     Hypertension  Daughter     No Known Problems Sister     No Known Problems Sister     Hypertension Daughter     No Known Problems Daughter     Diabetes Maternal Aunt     Cataracts Maternal Aunt     No Known Problems Maternal Uncle     No Known Problems Paternal Aunt     No Known Problems Paternal Uncle     No Known Problems Maternal Grandmother     No Known Problems Maternal Grandfather     No Known Problems Paternal Grandmother     No Known Problems Paternal Grandfather     Amblyopia Neg Hx     Blindness Neg Hx     Cancer Neg Hx     Macular degeneration Neg Hx     Retinal detachment Neg Hx     Strabismus Neg Hx     Stroke Neg Hx     Thyroid disease Neg Hx     Ovarian cancer Neg Hx     Colon cancer Neg Hx     Breast cancer Neg Hx      Social History     Socioeconomic History    Marital status:      Spouse name: Not on file    Number of children: Not on file    Years of education: Not on file    Highest education level: Not on file   Social Needs    Financial resource strain: Not on file    Food insecurity - worry: Not on file    Food insecurity - inability: Not on file    Transportation needs - medical: Not on file    Transportation needs - non-medical: Not on file   Occupational History    Not on file   Tobacco Use    Smoking status: Never Smoker    Smokeless tobacco: Never Used   Substance and Sexual Activity    Alcohol use: No    Drug use: No    Sexual activity: No   Other Topics Concern    Not on file   Social History Narrative    , 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 No distress.   HENT:   Head: Normocephalic and atraumatic.   Nose: Mucosal  edema present. Right sinus exhibits no maxillary sinus tenderness and no frontal sinus tenderness. Left sinus exhibits no maxillary sinus tenderness and no frontal sinus tenderness.   Mouth/Throat: Uvula is midline and mucous membranes are normal. Posterior oropharyngeal erythema present. No oropharyngeal exudate or posterior oropharyngeal edema.   Eyes: EOM are normal. Pupils are equal, round, and reactive to light. Right eye exhibits no discharge. Left eye exhibits no discharge. Right conjunctiva is not injected. Left conjunctiva is not injected. No scleral icterus.   Neck: Neck supple. No tracheal deviation present. No thyromegaly present.   Cardiovascular: Normal rate, regular rhythm, normal heart sounds and intact distal pulses.   No murmur heard.  Pulmonary/Chest: Effort normal and breath sounds normal. No stridor. No respiratory distress. She has no wheezes.   Abdominal: Soft. Bowel sounds are normal. There is no tenderness.   Musculoskeletal: Normal range of motion. She exhibits no edema or deformity.   Spine and back NTTP.  No bony step-offs, crepitus.  FROM cervical spine.  No point TTP along R trapezius, scapula, shoulder.   Lymphadenopathy:     She has no cervical adenopathy.   Neurological: She is alert. Gait normal.   Skin: Skin is warm and dry. Capillary refill takes less than 2 seconds. She is not diaphoretic. No erythema.   Psychiatric: She has a normal mood and affect. Her behavior is normal.         Lab Results   Component Value Date    WBC 6.80 01/22/2016    HGB 14.6 01/22/2016    HCT 46.5 01/22/2016     01/22/2016    CHOL 163 09/11/2018    TRIG 49 09/11/2018    HDL 72 09/11/2018    ALT 13 09/11/2018    AST 14 09/11/2018     09/11/2018    K 4.0 09/11/2018     09/11/2018    CREATININE 1.0 09/11/2018    BUN 21 09/11/2018    CO2 25 09/11/2018    TSH 2.728 08/31/2018    INR 0.9 06/13/2014    HGBA1C 9.2 (H) 02/06/2019       The 10-year ASCVD risk score (Ramakrishnaelsa LIMON Jr., et al., 2013)  is: 17.7%    Values used to calculate the score:      Age: 69 years      Sex: Female      Is Non- : Yes      Diabetic: Yes      Tobacco smoker: No      Systolic Blood Pressure: 120 mmHg      Is BP treated: Yes      HDL Cholesterol: 72 mg/dL      Total Cholesterol: 163 mg/dL    (Imaging have been independently reviewed)  DEXA with osteoporosis    Assessment:       1. Strain of neck muscle, initial encounter    2. Chronic right shoulder pain    3. Acute nasopharyngitis    4. Essential hypertension    5. Osteoporosis, post-menopausal          Plan:       Sarita WAGNER was seen today for follow-up and neck pain.    Diagnoses and all orders for this visit:    Strain of neck muscle, initial encounter  Comments:  provided home exercises. Mobic x 1 week; advised not to take addtl NSAIDs with Mobic. PT referral  Orders:  -     Ambulatory Referral to Physical/Occupational Therapy  -     meloxicam (MOBIC) 7.5 MG tablet; Take 1 tablet (7.5 mg total) by mouth once daily. for 7 days    Chronic right shoulder pain  Comments:  likely from overuse. as above  Orders:  -     Ambulatory Referral to Physical/Occupational Therapy  -     meloxicam (MOBIC) 7.5 MG tablet; Take 1 tablet (7.5 mg total) by mouth once daily. for 7 days    Acute nasopharyngitis  Comments:  Discussed correct Flonase use.    Essential hypertension  Comments:  At goal.  Continue Norvasc 5, HCTZ 25, Toprol 25    Osteoporosis, post-menopausal  Comments:  Is taking calcium, vitamin-D supplement.  Is doing resistance exercises.  Patient declined bisphosphonate.         Side effects of medication(s) were discussed in detail and patient voiced understanding.  Patient will call back for any issues or complications.     RTC p.r.n. with PCP, Dr. Ford

## 2019-03-16 NOTE — PATIENT INSTRUCTIONS
I recommend rest and hydration.  Tylenol and NSAIDs, such as ibuprofen, Motrin, naproxen can be helpful for sore throat, headache, ear pain, muscle and joint pain, sneezing, fatigue.  Chloroseptic spray for sore throat.  Over-the-counter antihistamines (Allegra, Claritin, Zyrtec) for runny nose and decongestants (Sudafed) can also be helpful.  Nasal saline once to twice a day.  Hand washing can help prevent the spread of respiratory illnesses, especially from younger children.    Please do not take over-the-counter NSAIDs such as Aleve, ibuprofen, naproxen, Motrin, goodies powder with the Mobic.

## 2019-03-19 ENCOUNTER — OFFICE VISIT (OUTPATIENT)
Dept: INTERNAL MEDICINE | Facility: CLINIC | Age: 69
End: 2019-03-19
Payer: MEDICARE

## 2019-03-19 VITALS
DIASTOLIC BLOOD PRESSURE: 62 MMHG | HEART RATE: 55 BPM | WEIGHT: 121.69 LBS | HEIGHT: 64 IN | SYSTOLIC BLOOD PRESSURE: 118 MMHG | BODY MASS INDEX: 20.78 KG/M2 | OXYGEN SATURATION: 98 %

## 2019-03-19 DIAGNOSIS — M54.2 CERVICALGIA: Primary | ICD-10-CM

## 2019-03-19 PROCEDURE — 99999 PR PBB SHADOW E&M-EST. PATIENT-LVL III: CPT | Mod: PBBFAC,,, | Performed by: INTERNAL MEDICINE

## 2019-03-19 PROCEDURE — 3078F PR MOST RECENT DIASTOLIC BLOOD PRESSURE < 80 MM HG: ICD-10-PCS | Mod: CPTII,S$GLB,, | Performed by: INTERNAL MEDICINE

## 2019-03-19 PROCEDURE — 99213 PR OFFICE/OUTPT VISIT, EST, LEVL III, 20-29 MIN: ICD-10-PCS | Mod: S$GLB,,, | Performed by: INTERNAL MEDICINE

## 2019-03-19 PROCEDURE — 3074F PR MOST RECENT SYSTOLIC BLOOD PRESSURE < 130 MM HG: ICD-10-PCS | Mod: CPTII,S$GLB,, | Performed by: INTERNAL MEDICINE

## 2019-03-19 PROCEDURE — 99999 PR PBB SHADOW E&M-EST. PATIENT-LVL III: ICD-10-PCS | Mod: PBBFAC,,, | Performed by: INTERNAL MEDICINE

## 2019-03-19 PROCEDURE — 3078F DIAST BP <80 MM HG: CPT | Mod: CPTII,S$GLB,, | Performed by: INTERNAL MEDICINE

## 2019-03-19 PROCEDURE — 1101F PR PT FALLS ASSESS DOC 0-1 FALLS W/OUT INJ PAST YR: ICD-10-PCS | Mod: CPTII,S$GLB,, | Performed by: INTERNAL MEDICINE

## 2019-03-19 PROCEDURE — 3074F SYST BP LT 130 MM HG: CPT | Mod: CPTII,S$GLB,, | Performed by: INTERNAL MEDICINE

## 2019-03-19 PROCEDURE — 1101F PT FALLS ASSESS-DOCD LE1/YR: CPT | Mod: CPTII,S$GLB,, | Performed by: INTERNAL MEDICINE

## 2019-03-19 PROCEDURE — 99213 OFFICE O/P EST LOW 20 MIN: CPT | Mod: S$GLB,,, | Performed by: INTERNAL MEDICINE

## 2019-03-19 NOTE — PATIENT INSTRUCTIONS
Reach and Hold Exercise       Do this exercise on your hands and knees. Keep your knees under your hips and your hands under your shoulders. Keep your spine in a neutral position (not arched or sagging). Keep your ears in line with your shoulders. Hold for a few seconds before starting the exercise:  1. Tighten your abdominal muscles and raise one arm straight in front of you, palm down. Hold for 5 seconds, then lower. Repeat 5 times.  2. Do the exercise again, this time lifting your arm to the side. Repeat 5 times.  3. Do the exercise again, this time lifting your arm backward, palm up. Repeat 5 times.  Switch sides and do each exercise with the other arm.  Date Last Reviewed: 8/16/2015  © 4248-4910 [employer]. [address] [place] [address]. All rights reserved. This information is not intended as a substitute for professional medical care. Always follow your healthcare professional's instructions.        Shoulder and Upper Back Stretch  To start, stand tall with your ears, shoulders, and hips in line. Your feet should be slightly apart, positioned just under your hips. Focus your eyes directly in front of you.  this position for a few seconds before starting your exercise. This helps increase your awareness of proper posture.          Reach overhead and slightly back with both arms. Keep your shoulders and neck aligned and your elbows behind your shoulders:  · With your palms facing the ceiling, turn your fingers inward.  · Take a deep breath. Breathe out, and lower your elbows toward your buttocks. Hold for 5 seconds, then return to starting position.  · Repeat 3 times.  Date Last Reviewed: 8/16/2015  © 8614-4318 [employer]. [address] [place] [address]. All rights reserved. This information is not intended as a substitute for professional medical care. Always follow your healthcare professional's instructions.        Shoulder Clock Exercise    To  start, stand tall with your ears, shoulders, and hips in line. Your feet should be slightly apart, positioned just under your hips. Focus your eyes directly in front of you.  this position for a few seconds before starting your exercise. This helps increase your awareness of proper posture.  · Imagine that your right shoulder is the center of a clock. With the outer point of your shoulder, roll it around to slowly trace the outer edge of the clock.  · Move clockwise first, then counterclockwise.  · Repeat 3 to 5 times. Switch shoulders.   Date Last Reviewed: 10/2/2015  © 8484-4092 [employer]. [address] [place] [address]. All rights reserved. This information is not intended as a substitute for professional medical care. Always follow your healthcare professional's instructions.        Shoulder Girdle Stretch    To start, sit in a chair with your feet flat on the floor. Your weight should be slightly forward so that youre balanced evenly on your buttocks. Relax your shoulders and keep your head level. Using a chair with arms may help you keep your balance:  · Place 1 hand on the outside elbow of the other arm.  · Pull the arm across your body. Hold for 30 to 60 seconds. Repeat once.  · Switch sides.  For your safety, check with your healthcare provider before starting an exercise program.   Date Last Reviewed: 8/16/2015  © 4022-0989 [employer]. [address] [place] [address]. All rights reserved. This information is not intended as a substitute for professional medical care. Always follow your healthcare professional's instructions.        Shoulder Exercises    To start, sit in a chair with your feet flat on the floor. Your weight should be slightly forward so that youre balanced evenly on your buttocks. Relax your shoulders and keep your head level. Avoid arching your back or rounding your shoulders. Using a chair with arms may help you keep your  balance.  · Raise your arms, elbows bent, to shoulder height.  · Slowly move your forearms together. Hold for 5 seconds.  · Return to starting position. Repeat 5 times.  Date Last Reviewed: 10/1/2015  © 5199-8578 [employer]. [address] [place] [address]. All rights reserved. This information is not intended as a substitute for professional medical care. Always follow your healthcare professional's instructions.        Shoulder Shrug Exercise    To start, sit in a chair with your feet flat on the floor. Shift your weight slightly forward to avoid rounding your back. Relax. Keep your ears, shoulders, and hips aligned:  · Raise both of your shoulders as high as you can, as if you were trying to touch them to your ears. Keep your head and neck still and relaxed.  · Hold for a count of 10. Release.  · Repeat 5 times.  For your safety, check with your healthcare provider before starting an exercise program.   Date Last Reviewed: 8/16/2015 © 2000-2017 [employer]. [address] [place] [address]. All rights reserved. This information is not intended as a substitute for professional medical care. Always follow your healthcare professional's instructions.        Shoulder Squeeze Exercise    To start, sit in a chair with your feet flat on the floor. Shift your weight slightly forward to avoid rounding your back. Relax. Keep your ears, shoulders, and hips aligned:  · Raise your arms to shoulder height, elbows bent and palms forward.  · Move your arms back, squeezing your shoulder blades together.  · Hold for 10 seconds. Return to starting position.   · Repeat 5 times.   For your safety, check with your healthcare provider before starting an exercise program.   Date Last Reviewed: 8/16/2015 © 2000-2017 [employer]. [address] [place] [address]. All rights reserved. This information is not intended as a substitute for professional medical care.  Always follow your healthcare professional's instructions.

## 2019-03-19 NOTE — PROGRESS NOTES
Subjective:       Patient ID: Sarita Alvarez is a 69 y.o. female.    Chief Complaint: Neck Pain    Pt c/o neck pain that radiates to R shoulder. This has been occurring for 1-2 weeks. No associated weakness. Worse in AM upon awakening so thinks she may be sleeping wrong.       Review of Systems   Constitutional: Negative for fever.   Respiratory: Negative for shortness of breath.    Cardiovascular: Negative for chest pain.   Musculoskeletal: Positive for neck pain. Negative for back pain.   Neurological: Negative for weakness and numbness.       Objective:      Physical Exam   Constitutional: She is oriented to person, place, and time. She appears well-developed and well-nourished.   Neck: Normal range of motion. Neck supple. No thyromegaly present.   Cardiovascular: Normal rate, regular rhythm and normal heart sounds.   Pulmonary/Chest: Effort normal and breath sounds normal.   Musculoskeletal:        Cervical back: She exhibits normal range of motion, no tenderness and no bony tenderness.   Lymphadenopathy:     She has no cervical adenopathy.   Neurological: She is alert and oriented to person, place, and time. She has normal strength. No sensory deficit.   Reflex Scores:       Bicep reflexes are 2+ on the right side and 2+ on the left side.       Brachioradialis reflexes are 2+ on the right side and 2+ on the left side.  Psychiatric: She has a normal mood and affect. Her behavior is normal.       Assessment:       1. Cervicalgia        Plan:       1. mobic prn--she has rx already  2. Neck exercises  3. Call/rtc if not improving over next 4 weeks

## 2019-04-01 ENCOUNTER — TELEPHONE (OUTPATIENT)
Dept: INTERNAL MEDICINE | Facility: CLINIC | Age: 69
End: 2019-04-01

## 2019-04-01 DIAGNOSIS — H92.01 OTALGIA OF RIGHT EAR: Primary | ICD-10-CM

## 2019-04-01 NOTE — TELEPHONE ENCOUNTER
----- Message from Lelia Banks sent at 4/1/2019  9:21 AM CDT -----  Contact: pt   Name of Who is Calling: LICHA CHENEY [[id number]]    What is the request in detail: Patient is requesting a call back from Dr. Ford in regards to seeing an ENT doctor or going ton the ER for  Neck and back pain.....Please contact to further discuss and advise      Can the clinic reply by [facility]:     What Number to Call Back if not [facility]:  [phone]

## 2019-04-01 NOTE — TELEPHONE ENCOUNTER
ent for ear pain. Will need to be seen to address neck pain as this is not likely related to ear pain.

## 2019-04-02 ENCOUNTER — CLINICAL SUPPORT (OUTPATIENT)
Dept: OTOLARYNGOLOGY | Facility: CLINIC | Age: 69
End: 2019-04-02
Payer: MEDICARE

## 2019-04-02 ENCOUNTER — OFFICE VISIT (OUTPATIENT)
Dept: INTERNAL MEDICINE | Facility: CLINIC | Age: 69
End: 2019-04-02
Payer: MEDICARE

## 2019-04-02 VITALS
HEART RATE: 56 BPM | WEIGHT: 119.25 LBS | DIASTOLIC BLOOD PRESSURE: 78 MMHG | BODY MASS INDEX: 20.36 KG/M2 | HEIGHT: 64 IN | SYSTOLIC BLOOD PRESSURE: 124 MMHG

## 2019-04-02 DIAGNOSIS — H93.8X1 SENSATION OF FULLNESS IN RIGHT EAR: ICD-10-CM

## 2019-04-02 DIAGNOSIS — J30.9 ALLERGIC RHINITIS, UNSPECIFIED SEASONALITY, UNSPECIFIED TRIGGER: Primary | ICD-10-CM

## 2019-04-02 DIAGNOSIS — M54.2 CERVICALGIA: ICD-10-CM

## 2019-04-02 DIAGNOSIS — H90.3 BILATERAL SENSORINEURAL HEARING LOSS: Primary | ICD-10-CM

## 2019-04-02 DIAGNOSIS — R29.2 ABNORMAL ACOUSTIC REFLEX: ICD-10-CM

## 2019-04-02 PROCEDURE — 99999 PR PBB SHADOW E&M-EST. PATIENT-LVL III: ICD-10-PCS | Mod: PBBFAC,,, | Performed by: INTERNAL MEDICINE

## 2019-04-02 PROCEDURE — 1101F PT FALLS ASSESS-DOCD LE1/YR: CPT | Mod: CPTII,S$GLB,, | Performed by: INTERNAL MEDICINE

## 2019-04-02 PROCEDURE — 92557 COMPREHENSIVE HEARING TEST: CPT | Mod: S$GLB,,, | Performed by: AUDIOLOGIST-HEARING AID FITTER

## 2019-04-02 PROCEDURE — 92550 PR TYMPANOMETRY AND REFLEX THRESHOLD MEASUREMENTS: ICD-10-PCS | Mod: S$GLB,,, | Performed by: AUDIOLOGIST-HEARING AID FITTER

## 2019-04-02 PROCEDURE — 99213 OFFICE O/P EST LOW 20 MIN: CPT | Mod: S$GLB,,, | Performed by: INTERNAL MEDICINE

## 2019-04-02 PROCEDURE — 99999 PR PBB SHADOW E&M-EST. PATIENT-LVL III: CPT | Mod: PBBFAC,,, | Performed by: INTERNAL MEDICINE

## 2019-04-02 PROCEDURE — 92550 TYMPANOMETRY & REFLEX THRESH: CPT | Mod: S$GLB,,, | Performed by: AUDIOLOGIST-HEARING AID FITTER

## 2019-04-02 PROCEDURE — 3078F PR MOST RECENT DIASTOLIC BLOOD PRESSURE < 80 MM HG: ICD-10-PCS | Mod: CPTII,S$GLB,, | Performed by: INTERNAL MEDICINE

## 2019-04-02 PROCEDURE — 3078F DIAST BP <80 MM HG: CPT | Mod: CPTII,S$GLB,, | Performed by: INTERNAL MEDICINE

## 2019-04-02 PROCEDURE — 3074F SYST BP LT 130 MM HG: CPT | Mod: CPTII,S$GLB,, | Performed by: INTERNAL MEDICINE

## 2019-04-02 PROCEDURE — 3074F PR MOST RECENT SYSTOLIC BLOOD PRESSURE < 130 MM HG: ICD-10-PCS | Mod: CPTII,S$GLB,, | Performed by: INTERNAL MEDICINE

## 2019-04-02 PROCEDURE — 99213 PR OFFICE/OUTPT VISIT, EST, LEVL III, 20-29 MIN: ICD-10-PCS | Mod: S$GLB,,, | Performed by: INTERNAL MEDICINE

## 2019-04-02 PROCEDURE — 92557 PR COMPREHENSIVE HEARING TEST: ICD-10-PCS | Mod: S$GLB,,, | Performed by: AUDIOLOGIST-HEARING AID FITTER

## 2019-04-02 PROCEDURE — 1101F PR PT FALLS ASSESS DOC 0-1 FALLS W/OUT INJ PAST YR: ICD-10-PCS | Mod: CPTII,S$GLB,, | Performed by: INTERNAL MEDICINE

## 2019-04-02 NOTE — Clinical Note
Your patient, Sarita Alvarez, was recently seen for an audiogram.  My assessment and recommendations are enclosed.If you should have any questions or concerns, please contact me at [phone]. Sincerely,Sarah Alejo, [facility]ologist[facility]

## 2019-04-02 NOTE — PROGRESS NOTES
Subjective:       Patient ID: Sarita Alvarez is a 69 y.o. female.    Chief Complaint: Sinus Problem    Pt c/o spasm in R neck that occurs upon awakening. She admits to sleeping on that side. No radiation into arm. No weakness/paresthesias. Once she is up moving around, symptoms improve.     She is having 4-6 wks nasal drainage with some R facial/sinus pressure. Some clear rhinorrhea. No sore throat. No cough. No sob/wheezing. No fever. Using flonase with some relief.     Review of Systems   Constitutional: Negative for fever.   HENT: Positive for congestion, postnasal drip and rhinorrhea. Negative for ear pain and sore throat.    Respiratory: Negative for cough and shortness of breath.    Cardiovascular: Negative for chest pain.   Musculoskeletal: Positive for neck pain.   Neurological: Negative for weakness and numbness.       Objective:      Physical Exam   Constitutional: She is oriented to person, place, and time. She appears well-developed and well-nourished.   HENT:   Right Ear: Tympanic membrane, external ear and ear canal normal.   Left Ear: Tympanic membrane, external ear and ear canal normal.   Nose: No mucosal edema or rhinorrhea.   Mouth/Throat: No oropharyngeal exudate or posterior oropharyngeal erythema.   Neck: Neck supple. No thyromegaly present.   Cardiovascular: Normal rate, regular rhythm and normal heart sounds.   Pulmonary/Chest: Effort normal and breath sounds normal.   Musculoskeletal:        Right shoulder: Normal.        Left shoulder: Normal.        Cervical back: She exhibits tenderness. She exhibits normal range of motion and no bony tenderness.   Lymphadenopathy:     She has no cervical adenopathy.   Neurological: She is alert and oriented to person, place, and time. She has normal strength. No sensory deficit.   Reflex Scores:       Bicep reflexes are 2+ on the right side and 2+ on the left side.       Brachioradialis reflexes are 2+ on the right side and 2+ on the left  side.  Psychiatric: She has a normal mood and affect.       Assessment:       1. Allergic rhinitis, unspecified seasonality, unspecified trigger    2. Cervicalgia        Plan:       1. Add claritin to flonase  2. Sleep position strategies d/w pt in trying to avoid positions that trigger neck pain; tylenol prn; otc analgesic creams prn; neck exercises

## 2019-04-11 ENCOUNTER — CLINICAL SUPPORT (OUTPATIENT)
Dept: REHABILITATION | Facility: HOSPITAL | Age: 69
End: 2019-04-11
Attending: INTERNAL MEDICINE
Payer: MEDICARE

## 2019-04-11 DIAGNOSIS — M54.2 NECK PAIN: ICD-10-CM

## 2019-04-11 DIAGNOSIS — M53.82 DECREASED RANGE OF MOTION OF INTERVERTEBRAL DISCS OF CERVICAL SPINE: ICD-10-CM

## 2019-04-11 DIAGNOSIS — R29.3 POSTURAL IMBALANCE: ICD-10-CM

## 2019-04-11 DIAGNOSIS — M62.81 MUSCLE WEAKNESS OF UPPER EXTREMITY: ICD-10-CM

## 2019-04-11 PROCEDURE — 97110 THERAPEUTIC EXERCISES: CPT | Mod: PO

## 2019-04-11 PROCEDURE — G8979 MOBILITY GOAL STATUS: HCPCS | Mod: CI,PO

## 2019-04-11 PROCEDURE — G8978 MOBILITY CURRENT STATUS: HCPCS | Mod: CI,PO

## 2019-04-11 PROCEDURE — 97161 PT EVAL LOW COMPLEX 20 MIN: CPT | Mod: PO

## 2019-04-11 NOTE — PROGRESS NOTES
[facility]  Physical Therapy Initial Evaluation    Name: Sarita Alvarez  Clinic Number: [id number]    Therapy Diagnosis:   Encounter Diagnoses   Name Primary?    Postural imbalance     Neck pain     Muscle weakness of upper extremity     Decreased range of motion of intervertebral discs of cervical spine      Physician: Tori Ibarra MD    Physician Orders: PT Eval and Treat   Medical Diagnosis from Referral: strain of neck muscle, initial encounter, chronic right shoulder pain  Evaluation Date: 4/11/2019  Authorization Period Expiration: 6/11/19  Plan of Care Expiration: 6/11/19  Visit # / Visits authorized: 1/ 12    Time In: 1008  Time Out: 1100  Total Billable Time: 52 minutes    Precautions: Standard, Diabetes and osteoporosis    See full physical therapy evaluation in POC.     CMS Impairment/Limitation/Restriction for FOTO neck Survey    Therapist reviewed FOTO scores for Sarita Alvarez on 4/11/2019.   FOTO documents entered into [employer] - see Media section.    Limitation Score: 4%  Category: Mobility    Current : CI = at least 1% but < 20% impaired, limited or restricted  Goal: CI = at least 1% but < 20% impaired, limited or restricted  Discharge: CI = at least 1% but < 20% impaired, limited or restricted       TREATMENT   Treatment Time In: 1038  Treatment Time Out: 1100  Total Treatment time separate from Evaluation: 22 minutes    Sarita received therapeutic exercises to develop strength, endurance, ROM, flexibility, posture and core stabilization for 17 minutes including:    Scalenes stretch 3 x 30 sec ea  Scapular retraction + chin tuck x 20  Cervical flexion stretch w overpressure 3 x 30 sec   Upper trap stretch w overpressure 3 x 30 sec    Sarita received the following manual therapy techniques x 5 minutes: STM to B scalenes and cervical paraspinals    Home Exercises and Patient Education Provided    Education provided:   - importance of proper upright posture  -  effects of postural weakness    Written Home Exercises Provided: yes. (posture corrections, scapular retractions, chin tucks, upper trap stretch, scalenes stretch, cervical flexion stretch)  Exercises were reviewed and Sarita was able to demonstrate them prior to the end of the session.  Sarita demonstrated good  understanding of the education provided.     See EMR under Patient Instructions for exercises provided 4/11/2019.    Assessment   Sarita WAGNER is a 69 y.o. female referred to outpatient Physical Therapy with a medical diagnosis of strain of neck muscle (initial encounter) and chronic right shoulder pain. Pt presents with reports of neck pain, postural imbalance, B periscapular weakness, decreased cervical AROM, soft tissue dysfunction, and decreased thoracic mobility. Pt responded well to cervical stretching and posture corrections.     Pt prognosis is Good.   Pt will benefit from skilled outpatient Physical Therapy to address the deficits stated above and in the chart below, provide pt/family education, and to maximize pt's level of independence.     Plan of care discussed with patient: Yes  Pt's spiritual, cultural and educational needs considered and patient is agreeable to the plan of care and goals as stated below:     Anticipated Barriers for therapy: none    Medical Necessity is demonstrated by the following  History  Co-morbidities and personal factors that may impact the plan of care Co-morbidities:   diabetes and osteoporosis    Personal Factors:   lifestyle     moderate   Examination  Body Structures and Functions, activity limitations and participation restrictions that may impact the plan of care Body Regions:   head  neck  upper extremities  trunk    Body Systems:    gross symmetry  ROM  strength  gross coordinated movement  motor control  motor learning    Participation Restrictions:   ADLs, IADLs    Activity limitations:   Learning and applying knowledge  no deficits    General Tasks and  Commands  no deficits    Communication  no deficits    Mobility  lifting and carrying objects  driving (bike, car, motorcycle)    Self care  no deficits    Domestic Life  doing house work (cleaning house, washing dishes, laundry)    Interactions/Relationships  no deficits    Life Areas  no deficits    Community and Social Life  recreation and leisure         high   Clinical Presentation stable and uncomplicated low   Decision Making/ Complexity Score: low     Medical necessity is demonstrated by the following IMPAIRMENTS/PROBLEM LIST:   1) Increase in pain level limiting function   2) Decreased cervical AROM   3) Postural imbalance   4) Periscapular weakness   5) Lack of HEP    GOALS: Short Term Goals:  4 weeks  1. Report decreased neck pain  <  / =  1/10 at worst to increase tolerance for turning head.   2. Pt will increase B cervical SB AROM to 30 deg to indicate improve flexibility.   3. Pt will be able to demonstrate proper upright posture without verbal cueing to decrease abnormal spinal stresses.   4. Pt will be able to tolerate multi-directional periscapular strengthening without adverse effects to improve postural mechanics.   5. Pt to tolerate HEP to improve ROM and independence with ADL's.    Long Term Goals: 8 weeks  1. Report consistent neck pain 0/10 to increase tolerance for turning head.   2. Pt will increase B cervical SB AROM to 40 deg to indicate improve flexibility.   3. Pt will be able to perform 2 x 10 multi-directional periscapular strengthening without fatigue to indicate improved postural strength and endurance.  4. Pt to be Independent with HEP to improve ROM and independence with ADL's.    Plan   Plan of care Certification: 4/11/2019 to 6/11/19.    Outpatient Physical Therapy 2 times weekly for 8 weeks to include the following interventions: Cervical/Lumbar Traction, Manual Therapy, Moist Heat/ Ice, Patient Education, Therapeutic Activites and Therapeutic Exercise.     Louise Richter, PT

## 2019-04-12 PROBLEM — M62.81 MUSCLE WEAKNESS OF UPPER EXTREMITY: Status: ACTIVE | Noted: 2019-04-12

## 2019-04-12 PROBLEM — R29.3 POSTURAL IMBALANCE: Status: ACTIVE | Noted: 2019-04-12

## 2019-04-12 PROBLEM — M53.82 DECREASED RANGE OF MOTION OF INTERVERTEBRAL DISCS OF CERVICAL SPINE: Status: ACTIVE | Noted: 2019-04-12

## 2019-04-12 PROBLEM — M54.2 NECK PAIN: Status: ACTIVE | Noted: 2019-04-12

## 2019-04-12 NOTE — PLAN OF CARE
[facility]  Physical Therapy Initial Evaluation    Name: Sarita Alvarez  Clinic Number: [id number]    Therapy Diagnosis:   Encounter Diagnoses   Name Primary?    Postural imbalance     Neck pain     Muscle weakness of upper extremity     Decreased range of motion of intervertebral discs of cervical spine      Physician: Tori Ibarra MD    Physician Orders: PT Eval and Treat   Medical Diagnosis from Referral: strain of neck muscle, initial encounter, chronic right shoulder pain  Evaluation Date: 4/11/2019  Authorization Period Expiration: 6/11/19  Plan of Care Expiration: 6/11/19  Visit # / Visits authorized: 1/ 12    Time In: 1008  Time Out: 1100  Total Billable Time: 52 minutes    Precautions: Standard, Diabetes and osteoporosis    Subjective   Date of onset: February 2019 after receiving flu shot  History of current condition - Sarita reports: after her flu shot she got a head cold and started to feel sinus issues. Her symptoms have gotten better lately since she started taking Claritin as recommended by her doctor. The left side of her neck is starting to hurt.      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    Medications:   Sarita WAGNER has a current medication list which  includes the following prescription(s): amlodipine, aspirin, blood sugar diagnostic, blood-glucose meter, calcium carbonate/vitamin d3, enalapril, fluticasone, hydrochlorothiazide, insulin aspart u-100, insulin degludec, pen needle, diabetic, lancets, methimazole, metoprolol succinate, pantoprazole, and simvastatin.    Allergies:   Review of patient's allergies indicates:   Allergen Reactions    Actonel [risedronate]      Other reaction(s): chest pain    Codeine Rash and Itching     Other reaction(s): Itching  Other reaction(s): Hives        Imaging: none for neck or shoulder    Prior Therapy: none  Social History: lives with their daughter  Occupation: retired  Physical activity: UE weights (3.5 lbs) and walking daily  Prior Level of Function: independent with all ADLs, currently driving  Current Level of Function: difficulty with turning head, looking down, pulling dog on leash, heavy lifting    Pain:  Current 3/10, worst 3/10, best 0/10   Location: right neck   Description: tightness  Aggravating Factors: turning head  Easing Factors: heat   Ringing in ears, B&B changes, dizziness, HAs, blurry vision: denies all  Radicular symptoms: none    Pts goals: to reduce pain    Objective     Observation: pleasant and cooperative    Posture: forward head    Cervical Range of Motion:    Degrees Pain   Flexion 60 no     Extension 50 R sided neck pain     Right Side Bending 20 no     Left Side Bending 25 R sided neck pain     Right Rotation 50 no   Left Rotation 50 R sided neck pain      Shoulder Range of Motion: B WNL in all planes    Upper Extremity Strength  (R) UE  (L) UE    Shoulder flexion: 4+/5 Shoulder flexion: 5/5   Shoulder Abduction: 4/5 Shoulder abduction: 5/5   Elbow flexion:h 5/5 Elbow flexion: 5/5   Elbow extension: 5/5 Elbow extension: 5/5   Lower Trap 4/5 Lower Trap 4/5   Middle Trap 4/5 Middle Trap 4/5   Rhomboids 4+/5 Rhomboids 5/5     Special Tests:  Distraction Positive for relief   Compression  negative   Spurlings B negative     Joint Mobility: Thoracic mobility: hypomobile    Palpation: soft tissue dysfunction in B scalenes and cervical paraspinals      Flexibility: mild cervical musculature tightness    CMS Impairment/Limitation/Restriction for FOTO neck Survey    Therapist reviewed FOTO scores for Sarita Alvarez on 4/11/2019.   FOTO documents entered into [employer] - see Media section.    Limitation Score: 4%  Category: Mobility    Current : CI = at least 1% but < 20% impaired, limited or restricted  Goal: CI = at least 1% but < 20% impaired, limited or restricted  Discharge: CI = at least 1% but < 20% impaired, limited or restricted       TREATMENT   Treatment Time In: 1038  Treatment Time Out: 1100  Total Treatment time separate from Evaluation: 22 minutes    Sarita received therapeutic exercises to develop strength, endurance, ROM, flexibility, posture and core stabilization for 17 minutes including:    Scalenes stretch 3 x 30 sec ea  Scapular retraction + chin tuck x 20  Cervical flexion stretch w overpressure 3 x 30 sec   Upper trap stretch w overpressure 3 x 30 sec    Sarita received the following manual therapy techniques x 5 minutes: STM to B scalenes and cervical paraspinals    Home Exercises and Patient Education Provided    Education provided:   - importance of proper upright posture  - effects of postural weakness    Written Home Exercises Provided: yes. (posture corrections, scapular retractions, chin tucks, upper trap stretch, scalenes stretch, cervical flexion stretch)  Exercises were reviewed and Sarita was able to demonstrate them prior to the end of the session.  Sarita demonstrated good  understanding of the education provided.     See EMR under Patient Instructions for exercises provided 4/11/2019.    Assessment   Sarita WAGNER is a 69 y.o. female referred to outpatient Physical Therapy with a medical diagnosis of strain of neck muscle (initial encounter) and chronic right shoulder  pain. Pt presents with reports of neck pain, postural imbalance, B periscapular weakness, decreased cervical AROM, soft tissue dysfunction, and decreased thoracic mobility. Pt responded well to cervical stretching and posture corrections.     Pt prognosis is Good.   Pt will benefit from skilled outpatient Physical Therapy to address the deficits stated above and in the chart below, provide pt/family education, and to maximize pt's level of independence.     Plan of care discussed with patient: Yes  Pt's spiritual, cultural and educational needs considered and patient is agreeable to the plan of care and goals as stated below:     Anticipated Barriers for therapy: none    Medical Necessity is demonstrated by the following  History  Co-morbidities and personal factors that may impact the plan of care Co-morbidities:   diabetes and osteoporosis    Personal Factors:   lifestyle     moderate   Examination  Body Structures and Functions, activity limitations and participation restrictions that may impact the plan of care Body Regions:   head  neck  upper extremities  trunk    Body Systems:    gross symmetry  ROM  strength  gross coordinated movement  motor control  motor learning    Participation Restrictions:   ADLs, IADLs    Activity limitations:   Learning and applying knowledge  no deficits    General Tasks and Commands  no deficits    Communication  no deficits    Mobility  lifting and carrying objects  driving (bike, car, motorcycle)    Self care  no deficits    Domestic Life  doing house work (cleaning house, washing dishes, laundry)    Interactions/Relationships  no deficits    Life Areas  no deficits    Community and Social Life  recreation and leisure         high   Clinical Presentation stable and uncomplicated low   Decision Making/ Complexity Score: low     Medical necessity is demonstrated by the following IMPAIRMENTS/PROBLEM LIST:   1) Increase in pain level limiting function   2) Decreased cervical  AROM   3) Postural imbalance   4) Periscapular weakness   5) Lack of HEP    GOALS: Short Term Goals:  4 weeks  1. Report decreased neck pain  <  / =  1/10 at worst to increase tolerance for turning head.   2. Pt will increase B cervical SB AROM to 30 deg to indicate improve flexibility.   3. Pt will be able to demonstrate proper upright posture without verbal cueing to decrease abnormal spinal stresses.   4. Pt will be able to tolerate multi-directional periscapular strengthening without adverse effects to improve postural mechanics.   5. Pt to tolerate HEP to improve ROM and independence with ADL's.    Long Term Goals: 8 weeks  1. Report consistent neck pain 0/10 to increase tolerance for turning head.   2. Pt will increase B cervical SB AROM to 40 deg to indicate improve flexibility.   3. Pt will be able to perform 2 x 10 multi-directional periscapular strengthening without fatigue to indicate improved postural strength and endurance.  4. Pt to be Independent with HEP to improve ROM and independence with ADL's.    Plan   Plan of care Certification: 4/11/2019 to 6/11/19.    Outpatient Physical Therapy 2 times weekly for 8 weeks to include the following interventions: Cervical/Lumbar Traction, Manual Therapy, Moist Heat/ Ice, Patient Education, Therapeutic Activites and Therapeutic Exercise.     Louise Richter, PT

## 2019-04-16 ENCOUNTER — LAB VISIT (OUTPATIENT)
Dept: LAB | Facility: HOSPITAL | Age: 69
End: 2019-04-16
Attending: INTERNAL MEDICINE
Payer: MEDICARE

## 2019-04-16 ENCOUNTER — OFFICE VISIT (OUTPATIENT)
Dept: CARDIOLOGY | Facility: CLINIC | Age: 69
End: 2019-04-16
Payer: MEDICARE

## 2019-04-16 VITALS
HEART RATE: 53 BPM | WEIGHT: 119.5 LBS | HEIGHT: 63 IN | DIASTOLIC BLOOD PRESSURE: 59 MMHG | BODY MASS INDEX: 21.17 KG/M2 | SYSTOLIC BLOOD PRESSURE: 128 MMHG

## 2019-04-16 DIAGNOSIS — I10 ESSENTIAL HYPERTENSION: Chronic | ICD-10-CM

## 2019-04-16 DIAGNOSIS — E10.65 POORLY CONTROLLED TYPE 1 DIABETES MELLITUS WITH PERIPHERAL NEUROPATHY: Chronic | ICD-10-CM

## 2019-04-16 DIAGNOSIS — E78.2 MIXED HYPERLIPIDEMIA: Chronic | ICD-10-CM

## 2019-04-16 DIAGNOSIS — E10.42 POORLY CONTROLLED TYPE 1 DIABETES MELLITUS WITH PERIPHERAL NEUROPATHY: Chronic | ICD-10-CM

## 2019-04-16 DIAGNOSIS — I10 ESSENTIAL HYPERTENSION: Primary | Chronic | ICD-10-CM

## 2019-04-16 LAB
ALBUMIN SERPL BCP-MCNC: 3.9 G/DL (ref 3.5–5.2)
ALP SERPL-CCNC: 83 U/L (ref 55–135)
ALT SERPL W/O P-5'-P-CCNC: 13 U/L (ref 10–44)
ANION GAP SERPL CALC-SCNC: 8 MMOL/L (ref 8–16)
AST SERPL-CCNC: 18 U/L (ref 10–40)
BASOPHILS # BLD AUTO: 0.04 K/UL (ref 0–0.2)
BASOPHILS NFR BLD: 0.6 % (ref 0–1.9)
BILIRUB SERPL-MCNC: 0.4 MG/DL (ref 0.1–1)
BUN SERPL-MCNC: 20 MG/DL (ref 8–23)
CALCIUM SERPL-MCNC: 10.2 MG/DL (ref 8.7–10.5)
CHLORIDE SERPL-SCNC: 101 MMOL/L (ref 95–110)
CHOLEST SERPL-MCNC: 173 MG/DL (ref 120–199)
CHOLEST/HDLC SERPL: 2.4 {RATIO} (ref 2–5)
CO2 SERPL-SCNC: 28 MMOL/L (ref 23–29)
CREAT SERPL-MCNC: 0.9 MG/DL (ref 0.5–1.4)
DIFFERENTIAL METHOD: ABNORMAL
EOSINOPHIL # BLD AUTO: 0.2 K/UL (ref 0–0.5)
EOSINOPHIL NFR BLD: 3 % (ref 0–8)
ERYTHROCYTE [DISTWIDTH] IN BLOOD BY AUTOMATED COUNT: 12.7 % (ref 11.5–14.5)
EST. GFR  (AFRICAN AMERICAN): >60 ML/MIN/1.73 M^2
EST. GFR  (NON AFRICAN AMERICAN): >60 ML/MIN/1.73 M^2
ESTIMATED AVG GLUCOSE: 177 MG/DL (ref 68–131)
GLUCOSE SERPL-MCNC: 192 MG/DL (ref 70–110)
HBA1C MFR BLD HPLC: 7.8 % (ref 4–5.6)
HCT VFR BLD AUTO: 42.5 % (ref 37–48.5)
HDLC SERPL-MCNC: 72 MG/DL (ref 40–75)
HDLC SERPL: 41.6 % (ref 20–50)
HGB BLD-MCNC: 13.2 G/DL (ref 12–16)
IMM GRANULOCYTES # BLD AUTO: 0.01 K/UL (ref 0–0.04)
IMM GRANULOCYTES NFR BLD AUTO: 0.1 % (ref 0–0.5)
LDLC SERPL CALC-MCNC: 90.8 MG/DL (ref 63–159)
LYMPHOCYTES # BLD AUTO: 1.6 K/UL (ref 1–4.8)
LYMPHOCYTES NFR BLD: 23 % (ref 18–48)
MCH RBC QN AUTO: 26.5 PG (ref 27–31)
MCHC RBC AUTO-ENTMCNC: 31.1 G/DL (ref 32–36)
MCV RBC AUTO: 85 FL (ref 82–98)
MONOCYTES # BLD AUTO: 0.4 K/UL (ref 0.3–1)
MONOCYTES NFR BLD: 5.8 % (ref 4–15)
NEUTROPHILS # BLD AUTO: 4.7 K/UL (ref 1.8–7.7)
NEUTROPHILS NFR BLD: 67.5 % (ref 38–73)
NONHDLC SERPL-MCNC: 101 MG/DL
NRBC BLD-RTO: 0 /100 WBC
PLATELET # BLD AUTO: 253 K/UL (ref 150–350)
PMV BLD AUTO: 10 FL (ref 9.2–12.9)
POTASSIUM SERPL-SCNC: 4.2 MMOL/L (ref 3.5–5.1)
PROT SERPL-MCNC: 7.5 G/DL (ref 6–8.4)
RBC # BLD AUTO: 4.98 M/UL (ref 4–5.4)
SODIUM SERPL-SCNC: 137 MMOL/L (ref 136–145)
TRIGL SERPL-MCNC: 51 MG/DL (ref 30–150)
WBC # BLD AUTO: 6.91 K/UL (ref 3.9–12.7)

## 2019-04-16 PROCEDURE — 3074F PR MOST RECENT SYSTOLIC BLOOD PRESSURE < 130 MM HG: ICD-10-PCS | Mod: CPTII,S$GLB,, | Performed by: INTERNAL MEDICINE

## 2019-04-16 PROCEDURE — 80061 LIPID PANEL: CPT

## 2019-04-16 PROCEDURE — 36415 COLL VENOUS BLD VENIPUNCTURE: CPT

## 2019-04-16 PROCEDURE — 3046F HEMOGLOBIN A1C LEVEL >9.0%: CPT | Mod: CPTII,S$GLB,, | Performed by: INTERNAL MEDICINE

## 2019-04-16 PROCEDURE — 3046F PR MOST RECENT HEMOGLOBIN A1C LEVEL > 9.0%: ICD-10-PCS | Mod: CPTII,S$GLB,, | Performed by: INTERNAL MEDICINE

## 2019-04-16 PROCEDURE — 3078F DIAST BP <80 MM HG: CPT | Mod: CPTII,S$GLB,, | Performed by: INTERNAL MEDICINE

## 2019-04-16 PROCEDURE — 1101F PR PT FALLS ASSESS DOC 0-1 FALLS W/OUT INJ PAST YR: ICD-10-PCS | Mod: CPTII,S$GLB,, | Performed by: INTERNAL MEDICINE

## 2019-04-16 PROCEDURE — 99214 OFFICE O/P EST MOD 30 MIN: CPT | Mod: S$GLB,,, | Performed by: INTERNAL MEDICINE

## 2019-04-16 PROCEDURE — 3078F PR MOST RECENT DIASTOLIC BLOOD PRESSURE < 80 MM HG: ICD-10-PCS | Mod: CPTII,S$GLB,, | Performed by: INTERNAL MEDICINE

## 2019-04-16 PROCEDURE — 99999 PR PBB SHADOW E&M-EST. PATIENT-LVL IV: ICD-10-PCS | Mod: PBBFAC,,, | Performed by: INTERNAL MEDICINE

## 2019-04-16 PROCEDURE — 3074F SYST BP LT 130 MM HG: CPT | Mod: CPTII,S$GLB,, | Performed by: INTERNAL MEDICINE

## 2019-04-16 PROCEDURE — 99214 PR OFFICE/OUTPT VISIT, EST, LEVL IV, 30-39 MIN: ICD-10-PCS | Mod: S$GLB,,, | Performed by: INTERNAL MEDICINE

## 2019-04-16 PROCEDURE — 80053 COMPREHEN METABOLIC PANEL: CPT

## 2019-04-16 PROCEDURE — 99999 PR PBB SHADOW E&M-EST. PATIENT-LVL IV: CPT | Mod: PBBFAC,,, | Performed by: INTERNAL MEDICINE

## 2019-04-16 PROCEDURE — 1101F PT FALLS ASSESS-DOCD LE1/YR: CPT | Mod: CPTII,S$GLB,, | Performed by: INTERNAL MEDICINE

## 2019-04-16 PROCEDURE — 99499 UNLISTED E&M SERVICE: CPT | Mod: S$GLB,,, | Performed by: INTERNAL MEDICINE

## 2019-04-16 PROCEDURE — 99499 RISK ADDL DX/OHS AUDIT: ICD-10-PCS | Mod: S$GLB,,, | Performed by: INTERNAL MEDICINE

## 2019-04-16 PROCEDURE — 83036 HEMOGLOBIN GLYCOSYLATED A1C: CPT

## 2019-04-16 PROCEDURE — 85025 COMPLETE CBC W/AUTO DIFF WBC: CPT

## 2019-04-16 RX ORDER — LORATADINE 10 MG/1
10 TABLET ORAL DAILY PRN
COMMUNITY

## 2019-04-16 NOTE — PROGRESS NOTES
Subjective:    Patient ID:  Sarita Alvarez is a 69 y.o. female who presents for follow-up of Hypertension      HPI     The patient is a 69 year old female last seen 9/28/17 with hypertension, hyperlipidemia, insulin depended diabetes and post ablation of AVNRT in 2014. She reports no chest pain or THOMAS. She exercises regularly  Lab Results   Component Value Date     09/11/2018    K 4.0 09/11/2018     09/11/2018    CO2 25 09/11/2018    BUN 21 09/11/2018    CREATININE 1.0 09/11/2018     (H) 09/11/2018    HGBA1C 9.2 (H) 02/06/2019    MG 2.3 04/26/2014    AST 14 09/11/2018    ALT 13 09/11/2018    ALBUMIN 3.9 09/11/2018    PROT 7.3 09/11/2018    BILITOT 0.5 09/11/2018    WBC 6.80 01/22/2016    HGB 14.6 01/22/2016    HCT 46.5 01/22/2016    MCV 82 01/22/2016     01/22/2016    INR 0.9 06/13/2014    TSH 2.728 08/31/2018         Lab Results   Component Value Date    CHOL 163 09/11/2018    HDL 72 09/11/2018    TRIG 49 09/11/2018       Lab Results   Component Value Date    LDLCALC 81.2 09/11/2018       Past Medical History:   Diagnosis Date    Diabetes mellitus type I     Diabetic peripheral neuropathy 7/9/2012    HTN (hypertension) 7/9/2012    Hyperlipidemia 7/9/2012    Osteoporosis, post-menopausal 7/9/2012    Poorly controlled type 1 diabetes mellitus with peripheral neuropathy 7/9/2012    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 1 Tablet Oral Every day, Disp: , Rfl:     blood sugar diagnostic Strp, 1 each by Misc.(Non-Drug; Combo Route) route 4 (four) times daily., Disp: 400 strip, Rfl: 3    CALCIUM CARBONATE/VITAMIN D3 (CALCIUM+D ORAL), Take by  "mouth. 1 Capsule Oral Twice a day , Disp: , Rfl:     enalapril (VASOTEC) 20 MG tablet, TAKE 1 TABLET BY MOUTH ONCE DAILY, Disp: 90 tablet, Rfl: 3    fluticasone (FLONASE) 50 mcg/actuation nasal spray, USE TWO SPRAY(S) IN EACH NOSTRIL ONCE DAILY, Disp: 16 g, Rfl: 1    hydroCHLOROthiazide (HYDRODIURIL) 25 MG tablet, TAKE ONE TABLET BY MOUTH ONCE DAILY, Disp: 90 tablet, Rfl: 3    insulin aspart U-100 (NOVOLOG FLEXPEN U-100 INSULIN) 100 unit/mL InPn pen, INJECT 6 UNITS SUBCUTANEOUSLY THREE TIMES DAILY WITH MEALS OR ICR 1:13.  MAX DAILY DOSE OF 30-35 UNITS, Disp: 30 mL, Rfl: 3    insulin degludec (TRESIBA FLEXTOUCH U-100) 100 unit/mL (3 mL) InPn, Inject 15 Units into the skin every evening., Disp: 6 mL, Rfl: 2    insulin needles, disposable, (BD ULTRA-FINE MARIA D PEN NEEDLES) 32 x 5/32 " Ndle, 4 Sticks by [facility].(Non-Drug; Combo Route) route Daily., Disp: 400 each, Rfl: 1    lancets Misc, 1 each by Misc.(Non-Drug; Combo Route) route 4 (four) times daily., Disp: 400 each, Rfl: 3    loratadine (CLARITIN) 10 mg tablet, Take 10 mg by mouth daily as needed for Allergies., Disp: , Rfl:     methIMAzole (TAPAZOLE) 5 MG Tab, Take 1 tablet (5 mg total) by mouth once daily., Disp: 90 tablet, Rfl: 3    metoprolol succinate (TOPROL-XL) 25 MG 24 hr tablet, TAKE ONE TABLET BY MOUTH ONCE DAILY, Disp: 90 tablet, Rfl: 3    pantoprazole (PROTONIX) 40 MG tablet, Take 1 tablet (40 mg total) by mouth daily as needed., Disp: 30 tablet, Rfl: 1    simvastatin (ZOCOR) 20 MG tablet, TAKE ONE TABLET BY MOUTH ONCE DAILY, Disp: 90 tablet, Rfl: 3    blood-glucose meter kit, Use as instructed, Disp: 1 each, Rfl: 0          Review of Systems   Constitution: Negative for decreased appetite, diaphoresis, fever, malaise/fatigue, weight gain and weight loss.   HENT: Negative for congestion, ear discharge, ear pain and nosebleeds.    Eyes: Negative for blurred vision, double vision and visual disturbance.   Cardiovascular: Negative for chest pain, " "claudication, cyanosis, dyspnea on exertion, irregular heartbeat, leg swelling, near-syncope, orthopnea, palpitations, paroxysmal nocturnal dyspnea and syncope.   Respiratory: Negative for cough, hemoptysis, shortness of breath, sleep disturbances due to breathing, snoring, sputum production and wheezing.    Endocrine: Negative for polydipsia, polyphagia and polyuria.   Hematologic/Lymphatic: Negative for adenopathy and bleeding problem. Does not bruise/bleed easily.   Skin: Negative for color change, nail changes, poor wound healing and rash.   Musculoskeletal: Negative for muscle cramps and muscle weakness.   Gastrointestinal: Negative for abdominal pain, anorexia, change in bowel habit, hematochezia, nausea and vomiting.   Genitourinary: Negative for dysuria, frequency and hematuria.   Neurological: Negative for brief paralysis, difficulty with concentration, excessive daytime sleepiness, dizziness, focal weakness, headaches, light-headedness, seizures, vertigo and weakness.   Psychiatric/Behavioral: Negative for altered mental status and depression.   Allergic/Immunologic: Negative for persistent infections.        Objective:BP (!) 128/59 (BP Location: Left arm, Patient Position: Sitting, BP Method: Large (Automatic))   Pulse (!) 53   Ht 5' 3" (1.6 m)   Wt 54.2 kg (119 lb 7.8 oz)   BMI 21.17 kg/m²             Physical Exam   Constitutional: She is oriented to person, place, and time. She appears well-developed and well-nourished.   HENT:   Head: Normocephalic.   Right Ear: External ear normal.   Left Ear: External ear normal.   Nose: Nose normal.   Inspection of lips, teeth and gums normal   Eyes: Pupils are equal, round, and reactive to light. Conjunctivae and EOM are normal. No scleral icterus.   Neck: Normal range of motion. No JVD present. No tracheal deviation present. No thyromegaly present.   Cardiovascular: Normal rate, regular rhythm, normal heart sounds and intact distal pulses. Exam reveals no " gallop and no friction rub.   No murmur heard.  Pulses:       Posterior tibial pulses are 2+ on the right side, and 2+ on the left side.   Pulmonary/Chest: Effort normal and breath sounds normal. No respiratory distress. She has no wheezes. She has no rales. She exhibits no tenderness.   Abdominal: Soft. Bowel sounds are normal. She exhibits no distension. There is no hepatosplenomegaly. There is no tenderness. There is no guarding.   Musculoskeletal: Normal range of motion. She exhibits no edema or tenderness.   Lymphadenopathy:   Palpation of lymph nodes of neck and groin normal   Neurological: She is oriented to person, place, and time. No cranial nerve deficit. She exhibits normal muscle tone. Coordination normal.   Skin: Skin is warm and dry. No rash noted. No erythema. No pallor.   Palpation of skin normal   Psychiatric: She has a normal mood and affect. Her behavior is normal. Judgment and thought content normal.         Assessment:       1. Essential hypertension    2. Mixed hyperlipidemia    3. Poorly controlled type 1 diabetes mellitus with peripheral neuropathy         Plan:       Sarita WAGNER was seen today for hypertension.    Diagnoses and all orders for this visit:    Essential hypertension  -     CBC auto differential; Future; Expected date: 04/16/2019    Mixed hyperlipidemia  -     Lipid panel; Future; Expected date: 04/16/2019    Poorly controlled type 1 diabetes mellitus with peripheral neuropathy  -     Comprehensive metabolic panel; Future; Expected date: 04/16/2019  -     Hemoglobin A1c; Future; Expected date: 04/16/2019    Other orders  -     loratadine (CLARITIN) 10 mg tablet; Take 10 mg by mouth daily as needed for Allergies.

## 2019-05-22 NOTE — PROGRESS NOTES
Sarah Alejo, CCC-A  Audiologist - [facility] [address] [place] [address]  [email or web address]  [phone]    Patient: Sarita Alvarez   MRN: [id number]  : 1950  THOMAS: 2019      AUDIOLOGICAL EVALUATION    RECOMMENDATIONS:   It is recommended that she:  Follow up medically with a physician.  Continue to receive audiological monitoring annually.  Use precaution and/or hearing protection in noisy environments.    If you should have any questions or concerns regarding the above information, please do not hesitate to contact me at [phone].      _______________________________  Sarah Alejo, JOHN-A  Audiologist

## 2019-07-10 DIAGNOSIS — E10.9 TYPE 1 DIABETES MELLITUS NOT AT GOAL: ICD-10-CM

## 2019-07-10 RX ORDER — INSULIN DEGLUDEC 100 U/ML
INJECTION, SOLUTION SUBCUTANEOUS
Qty: 6 ML | Refills: 2 | Status: SHIPPED | OUTPATIENT
Start: 2019-07-10 | End: 2019-11-11

## 2019-07-10 RX ORDER — INSULIN DEGLUDEC 100 U/ML
INJECTION, SOLUTION SUBCUTANEOUS
Qty: 6 ML | Refills: 2 | Status: SHIPPED | OUTPATIENT
Start: 2019-07-10 | End: 2019-07-10 | Stop reason: SDUPTHER

## 2019-07-10 NOTE — TELEPHONE ENCOUNTER
----- Message from Kenia Brown sent at 7/10/2019  2:53 PM CDT -----  Contact: pt  Can the clinic reply in [facility]:no       Please refill the medication(s) listed below. The patient can be reached at this phone number  once it is called into the pharmacy. [phone]      Medication #1insulin degludec (TRESIBA FLEXTOUCH U-100) 100 unit/mL (3 mL) InPn      Medication #2      Preferred Pharmacy:[facility] [address] [place] [address] [address] [phone] (Phone)  [phone] (Fax)

## 2019-07-25 DIAGNOSIS — I10 ESSENTIAL HYPERTENSION: Primary | ICD-10-CM

## 2019-07-25 RX ORDER — HYDROCHLOROTHIAZIDE 25 MG/1
25 TABLET ORAL DAILY
Qty: 90 TABLET | Refills: 2 | Status: SHIPPED | OUTPATIENT
Start: 2019-07-25 | End: 2020-10-16

## 2019-07-25 RX ORDER — HYDROCHLOROTHIAZIDE 25 MG/1
TABLET ORAL
Qty: 90 TABLET | Refills: 2 | Status: SHIPPED | OUTPATIENT
Start: 2019-07-25 | End: 2019-07-25 | Stop reason: SDUPTHER

## 2019-07-25 NOTE — TELEPHONE ENCOUNTER
----- Message from Lelia Banks sent at 7/25/2019  1:11 PM CDT -----  Contact: LICHA CHENEY [[id number]]  Can the clinic reply in [facility]:No     Please refill the medication(s) listed below. The patient can be reached at this phone number once it is called into the pharmacy.[phone]    Medication #1hydroCHLOROthiazide (HYDRODIURIL) 25 MG tablet    Medication #2    Preferred Pharmacy:[facility] - [place] [address] [address]  Patient states she's out of medication and she would like it ASAP.  SJ

## 2019-08-01 DIAGNOSIS — I47.10 SVT (SUPRAVENTRICULAR TACHYCARDIA): ICD-10-CM

## 2019-08-01 RX ORDER — METOPROLOL SUCCINATE 25 MG/1
25 TABLET, EXTENDED RELEASE ORAL DAILY
Qty: 90 TABLET | Refills: 2 | Status: SHIPPED | OUTPATIENT
Start: 2019-08-01 | End: 2020-10-23

## 2019-08-01 RX ORDER — METOPROLOL SUCCINATE 25 MG/1
TABLET, EXTENDED RELEASE ORAL
Qty: 90 TABLET | Refills: 2 | Status: SHIPPED | OUTPATIENT
Start: 2019-08-01 | End: 2019-08-01 | Stop reason: SDUPTHER

## 2019-08-01 NOTE — TELEPHONE ENCOUNTER
----- Message from Miguel Ángel Hall sent at 8/1/2019  1:17 PM CDT -----  Contact: Sarita [phone]  Type: RX Refill Request    Who Called: Sarita Melgoza or New Rx:refill    RX Name and Strength:metoprolol succinate (TOPROL-XL) 25 MG 24 hr tablet    Is this a 30 day or 90 day RX:30 day    Preferred Pharmacy with phone number: [facility] - [place] [address] [address]    Would the patient rather a call back or a response via [facility]? Call back    Best Call Back Number:[phone]

## 2019-09-03 DIAGNOSIS — E05.90 HYPERTHYROIDISM: ICD-10-CM

## 2019-09-03 RX ORDER — METHIMAZOLE 5 MG/1
5 TABLET ORAL DAILY
Qty: 90 TABLET | Refills: 2 | OUTPATIENT
Start: 2019-09-03

## 2019-09-03 RX ORDER — METHIMAZOLE 5 MG/1
TABLET ORAL
Qty: 90 TABLET | Refills: 2 | Status: SHIPPED | OUTPATIENT
Start: 2019-09-03 | End: 2020-05-28 | Stop reason: SDUPTHER

## 2019-09-03 NOTE — TELEPHONE ENCOUNTER
----- Message from Marysol Paul sent at 9/3/2019 10:20 AM CDT -----  Contact: LICHA CHENEY [[id number]]  Can the clinic reply in [facility]:     Please refill the medication(s) listed below. The patient can be reached at this phone number once it is called into the pharmacy.[phone]     Medication #1methIMAzole (TAPAZOLE) 5 MG Tab    Medication #2    Preferred Pharmacy: [facility] - [place] [address] [address]

## 2019-09-17 RX ORDER — PANTOPRAZOLE SODIUM 40 MG/1
TABLET, DELAYED RELEASE ORAL
Qty: 30 TABLET | Refills: 1 | Status: SHIPPED | OUTPATIENT
Start: 2019-09-17 | End: 2020-10-23

## 2019-11-11 DIAGNOSIS — E10.9 TYPE 1 DIABETES MELLITUS NOT AT GOAL: ICD-10-CM

## 2019-11-11 RX ORDER — INSULIN DEGLUDEC 100 U/ML
INJECTION, SOLUTION SUBCUTANEOUS
Qty: 6 ML | Refills: 0 | Status: SHIPPED | OUTPATIENT
Start: 2019-11-11 | End: 2019-12-22

## 2019-11-14 DIAGNOSIS — E78.5 HYPERLIPIDEMIA, UNSPECIFIED HYPERLIPIDEMIA TYPE: ICD-10-CM

## 2019-11-14 RX ORDER — SIMVASTATIN 20 MG/1
TABLET, FILM COATED ORAL
Qty: 90 TABLET | Refills: 1 | Status: SHIPPED | OUTPATIENT
Start: 2019-11-14 | End: 2020-05-05

## 2019-11-15 ENCOUNTER — PATIENT OUTREACH (OUTPATIENT)
Dept: ADMINISTRATIVE | Facility: HOSPITAL | Age: 69
End: 2019-11-15

## 2019-11-15 DIAGNOSIS — E11.9 DIABETES MELLITUS WITHOUT COMPLICATION: Primary | ICD-10-CM

## 2019-12-21 DIAGNOSIS — E10.9 TYPE 1 DIABETES MELLITUS NOT AT GOAL: ICD-10-CM

## 2019-12-22 RX ORDER — INSULIN DEGLUDEC 100 U/ML
INJECTION, SOLUTION SUBCUTANEOUS
Qty: 6 ML | Refills: 0 | Status: SHIPPED | OUTPATIENT
Start: 2019-12-22 | End: 2020-02-03

## 2019-12-26 PROBLEM — M54.2 NECK PAIN: Status: RESOLVED | Noted: 2019-04-12 | Resolved: 2019-12-26

## 2019-12-26 PROBLEM — M53.82 DECREASED RANGE OF MOTION OF INTERVERTEBRAL DISCS OF CERVICAL SPINE: Status: RESOLVED | Noted: 2019-04-12 | Resolved: 2019-12-26

## 2019-12-26 PROBLEM — M62.81 MUSCLE WEAKNESS OF UPPER EXTREMITY: Status: RESOLVED | Noted: 2019-04-12 | Resolved: 2019-12-26

## 2019-12-26 PROBLEM — R29.3 POSTURAL IMBALANCE: Status: RESOLVED | Noted: 2019-04-12 | Resolved: 2019-12-26

## 2019-12-30 DIAGNOSIS — I10 ESSENTIAL HYPERTENSION: Primary | ICD-10-CM

## 2019-12-30 RX ORDER — AMLODIPINE BESYLATE 5 MG/1
5 TABLET ORAL DAILY
Qty: 90 TABLET | Refills: 3 | Status: SHIPPED | OUTPATIENT
Start: 2019-12-30 | End: 2020-12-21

## 2019-12-30 NOTE — TELEPHONE ENCOUNTER
Pt was concern with bp readings of 154/55 and 149/65 . Pt stated she took her bp medications this morning ( amlodipine,hydrchlorothiazide, metoprolol, and enalapril) Pt asymptomatic. Informed pt to recheck bp later this evening and if she develop any sob,chest tightening, blur vision, severe headache, tingling hands/feet to go to ED.

## 2019-12-30 NOTE — TELEPHONE ENCOUNTER
----- Message from Mireya Lamar sent at 12/30/2019  2:24 PM CST -----  Contact: LICHA CHENEY [[id number]]  Contact: LICHA CHENEY [[id number]]    What is the request in detail:   Requesting a call in regards to her BP first time 154/55 pulse 60    second time 149/65 pulse 57     Can the clinic reply by [facility]:  No      What Number to Call Back if not in [facility]:  [phone]

## 2019-12-30 NOTE — TELEPHONE ENCOUNTER
----- Message from Alta Solorio sent at 12/30/2019  1:40 PM CST -----  Contact: pt  Can the clinic reply in [facility]: n      Please refill the medication(s) listed below. Please call the patient when the prescription(s) is ready for  at this phone number  [phone]      Medication #1 amLODIPine (NORVASC) 5 MG tablet    Medication #2       Preferred Pharmacy:    [facility] [address] [place] [address]  [address] [facility] [address]  Phone: [phone] Fax: [phone]

## 2020-01-24 ENCOUNTER — OFFICE VISIT (OUTPATIENT)
Dept: INTERNAL MEDICINE | Facility: CLINIC | Age: 70
End: 2020-01-24
Payer: MEDICARE

## 2020-01-24 ENCOUNTER — LAB VISIT (OUTPATIENT)
Dept: LAB | Facility: OTHER | Age: 70
End: 2020-01-24
Attending: INTERNAL MEDICINE
Payer: MEDICARE

## 2020-01-24 VITALS
HEIGHT: 63 IN | DIASTOLIC BLOOD PRESSURE: 70 MMHG | WEIGHT: 118.19 LBS | BODY MASS INDEX: 20.94 KG/M2 | SYSTOLIC BLOOD PRESSURE: 120 MMHG | OXYGEN SATURATION: 100 % | HEART RATE: 58 BPM

## 2020-01-24 DIAGNOSIS — E78.2 MIXED HYPERLIPIDEMIA: Chronic | ICD-10-CM

## 2020-01-24 DIAGNOSIS — Z98.890 STATUS POST CATHETER ABLATION OF SLOW PATHWAY: ICD-10-CM

## 2020-01-24 DIAGNOSIS — I87.2 VENOUS INSUFFICIENCY (CHRONIC) (PERIPHERAL): Chronic | ICD-10-CM

## 2020-01-24 DIAGNOSIS — E05.90 HYPERTHYROIDISM: ICD-10-CM

## 2020-01-24 DIAGNOSIS — M81.0 OSTEOPOROSIS, POST-MENOPAUSAL: Chronic | ICD-10-CM

## 2020-01-24 DIAGNOSIS — E10.42 DIABETIC PERIPHERAL NEUROPATHY ASSOCIATED WITH TYPE 1 DIABETES MELLITUS: ICD-10-CM

## 2020-01-24 DIAGNOSIS — E06.3 HASHIMOTO'S DISEASE: ICD-10-CM

## 2020-01-24 DIAGNOSIS — Z86.79 STATUS POST CATHETER ABLATION OF SLOW PATHWAY: ICD-10-CM

## 2020-01-24 DIAGNOSIS — I10 ESSENTIAL HYPERTENSION: Chronic | ICD-10-CM

## 2020-01-24 DIAGNOSIS — I70.0 AORTIC ATHEROSCLEROSIS: ICD-10-CM

## 2020-01-24 DIAGNOSIS — I10 ESSENTIAL HYPERTENSION: Primary | Chronic | ICD-10-CM

## 2020-01-24 DIAGNOSIS — I47.10 SUPRAVENTRICULAR TACHYCARDIA: ICD-10-CM

## 2020-01-24 LAB
ALBUMIN SERPL BCP-MCNC: 3.8 G/DL (ref 3.5–5.2)
ALP SERPL-CCNC: 76 U/L (ref 55–135)
ALT SERPL W/O P-5'-P-CCNC: 16 U/L (ref 10–44)
ANION GAP SERPL CALC-SCNC: 9 MMOL/L (ref 8–16)
AST SERPL-CCNC: 15 U/L (ref 10–40)
BASOPHILS # BLD AUTO: 0.03 K/UL (ref 0–0.2)
BASOPHILS NFR BLD: 0.4 % (ref 0–1.9)
BILIRUB SERPL-MCNC: 0.4 MG/DL (ref 0.1–1)
BUN SERPL-MCNC: 16 MG/DL (ref 8–23)
CALCIUM SERPL-MCNC: 9.6 MG/DL (ref 8.7–10.5)
CHLORIDE SERPL-SCNC: 102 MMOL/L (ref 95–110)
CHOLEST SERPL-MCNC: 170 MG/DL (ref 120–199)
CHOLEST/HDLC SERPL: 2.2 {RATIO} (ref 2–5)
CO2 SERPL-SCNC: 29 MMOL/L (ref 23–29)
CREAT SERPL-MCNC: 0.9 MG/DL (ref 0.5–1.4)
DIFFERENTIAL METHOD: ABNORMAL
EOSINOPHIL # BLD AUTO: 0.1 K/UL (ref 0–0.5)
EOSINOPHIL NFR BLD: 1.7 % (ref 0–8)
ERYTHROCYTE [DISTWIDTH] IN BLOOD BY AUTOMATED COUNT: 12.4 % (ref 11.5–14.5)
EST. GFR  (AFRICAN AMERICAN): >60 ML/MIN/1.73 M^2
EST. GFR  (NON AFRICAN AMERICAN): >60 ML/MIN/1.73 M^2
ESTIMATED AVG GLUCOSE: 186 MG/DL (ref 68–131)
GLUCOSE SERPL-MCNC: 205 MG/DL (ref 70–110)
HBA1C MFR BLD HPLC: 8.1 % (ref 4–5.6)
HCT VFR BLD AUTO: 42.4 % (ref 37–48.5)
HDLC SERPL-MCNC: 78 MG/DL (ref 40–75)
HDLC SERPL: 45.9 % (ref 20–50)
HGB BLD-MCNC: 12.8 G/DL (ref 12–16)
IMM GRANULOCYTES # BLD AUTO: 0.02 K/UL (ref 0–0.04)
IMM GRANULOCYTES NFR BLD AUTO: 0.3 % (ref 0–0.5)
LDLC SERPL CALC-MCNC: 83.4 MG/DL (ref 63–159)
LYMPHOCYTES # BLD AUTO: 1.3 K/UL (ref 1–4.8)
LYMPHOCYTES NFR BLD: 17.7 % (ref 18–48)
MCH RBC QN AUTO: 26.1 PG (ref 27–31)
MCHC RBC AUTO-ENTMCNC: 30.2 G/DL (ref 32–36)
MCV RBC AUTO: 87 FL (ref 82–98)
MONOCYTES # BLD AUTO: 0.5 K/UL (ref 0.3–1)
MONOCYTES NFR BLD: 6 % (ref 4–15)
NEUTROPHILS # BLD AUTO: 5.6 K/UL (ref 1.8–7.7)
NEUTROPHILS NFR BLD: 73.9 % (ref 38–73)
NONHDLC SERPL-MCNC: 92 MG/DL
NRBC BLD-RTO: 0 /100 WBC
PLATELET # BLD AUTO: 237 K/UL (ref 150–350)
PMV BLD AUTO: 9.9 FL (ref 9.2–12.9)
POTASSIUM SERPL-SCNC: 4.1 MMOL/L (ref 3.5–5.1)
PROT SERPL-MCNC: 7 G/DL (ref 6–8.4)
RBC # BLD AUTO: 4.9 M/UL (ref 4–5.4)
SODIUM SERPL-SCNC: 140 MMOL/L (ref 136–145)
TRIGL SERPL-MCNC: 43 MG/DL (ref 30–150)
TSH SERPL DL<=0.005 MIU/L-ACNC: 2.77 UIU/ML (ref 0.4–4)
WBC # BLD AUTO: 7.55 K/UL (ref 3.9–12.7)

## 2020-01-24 PROCEDURE — 84443 ASSAY THYROID STIM HORMONE: CPT

## 2020-01-24 PROCEDURE — 99999 PR PBB SHADOW E&M-EST. PATIENT-LVL III: ICD-10-PCS | Mod: PBBFAC,,, | Performed by: INTERNAL MEDICINE

## 2020-01-24 PROCEDURE — 80053 COMPREHEN METABOLIC PANEL: CPT

## 2020-01-24 PROCEDURE — 1101F PT FALLS ASSESS-DOCD LE1/YR: CPT | Mod: CPTII,S$GLB,, | Performed by: INTERNAL MEDICINE

## 2020-01-24 PROCEDURE — 3078F DIAST BP <80 MM HG: CPT | Mod: CPTII,S$GLB,, | Performed by: INTERNAL MEDICINE

## 2020-01-24 PROCEDURE — 80061 LIPID PANEL: CPT

## 2020-01-24 PROCEDURE — 1159F PR MEDICATION LIST DOCUMENTED IN MEDICAL RECORD: ICD-10-PCS | Mod: S$GLB,,, | Performed by: INTERNAL MEDICINE

## 2020-01-24 PROCEDURE — 83036 HEMOGLOBIN GLYCOSYLATED A1C: CPT

## 2020-01-24 PROCEDURE — 85025 COMPLETE CBC W/AUTO DIFF WBC: CPT

## 2020-01-24 PROCEDURE — 3074F SYST BP LT 130 MM HG: CPT | Mod: CPTII,S$GLB,, | Performed by: INTERNAL MEDICINE

## 2020-01-24 PROCEDURE — 3074F PR MOST RECENT SYSTOLIC BLOOD PRESSURE < 130 MM HG: ICD-10-PCS | Mod: CPTII,S$GLB,, | Performed by: INTERNAL MEDICINE

## 2020-01-24 PROCEDURE — 1159F MED LIST DOCD IN RCRD: CPT | Mod: S$GLB,,, | Performed by: INTERNAL MEDICINE

## 2020-01-24 PROCEDURE — 1126F AMNT PAIN NOTED NONE PRSNT: CPT | Mod: S$GLB,,, | Performed by: INTERNAL MEDICINE

## 2020-01-24 PROCEDURE — 36415 COLL VENOUS BLD VENIPUNCTURE: CPT

## 2020-01-24 PROCEDURE — 99214 OFFICE O/P EST MOD 30 MIN: CPT | Mod: S$GLB,,, | Performed by: INTERNAL MEDICINE

## 2020-01-24 PROCEDURE — 99999 PR PBB SHADOW E&M-EST. PATIENT-LVL III: CPT | Mod: PBBFAC,,, | Performed by: INTERNAL MEDICINE

## 2020-01-24 PROCEDURE — 3078F PR MOST RECENT DIASTOLIC BLOOD PRESSURE < 80 MM HG: ICD-10-PCS | Mod: CPTII,S$GLB,, | Performed by: INTERNAL MEDICINE

## 2020-01-24 PROCEDURE — 99214 PR OFFICE/OUTPT VISIT, EST, LEVL IV, 30-39 MIN: ICD-10-PCS | Mod: S$GLB,,, | Performed by: INTERNAL MEDICINE

## 2020-01-24 PROCEDURE — 1126F PR PAIN SEVERITY QUANTIFIED, NO PAIN PRESENT: ICD-10-PCS | Mod: S$GLB,,, | Performed by: INTERNAL MEDICINE

## 2020-01-24 PROCEDURE — 99499 UNLISTED E&M SERVICE: CPT | Mod: S$GLB,,, | Performed by: INTERNAL MEDICINE

## 2020-01-24 PROCEDURE — 1101F PR PT FALLS ASSESS DOC 0-1 FALLS W/OUT INJ PAST YR: ICD-10-PCS | Mod: CPTII,S$GLB,, | Performed by: INTERNAL MEDICINE

## 2020-01-24 PROCEDURE — 99499 RISK ADDL DX/OHS AUDIT: ICD-10-PCS | Mod: S$GLB,,, | Performed by: INTERNAL MEDICINE

## 2020-01-24 NOTE — PROGRESS NOTES
Subjective:       Patient ID: Sarita Alvarez is a 69 y.o. female.    Chief Complaint: Diabetes    Pt here for f/u. Pt has DM1 and is followed by endocrinology. She is on Tresiba 15 units and novolog 4-5 units plus SSI per carb counting. Her last A1C was 7.8. She has complications of diabetes that includes neuropathy in feet. This is tolerable without meds. No recent lows. Her fasting sugars are 110s-130s and prandial sugars are under 180 with excursions higher but no lows. She is still fearful of hypoglycemia so underdoses insulin at times. Due for eye exam which she will schedule.    She also sees endocrinology for hyperthyroidism/hashimotos. She is on tapazole and monitored regularly by endocrinology but is due. She is clinically euthyroid.     She has varicose veins that cause some swelling at times but manages with compression hose. Has seen vascular in past.     Pt's BP is well controlled. Tolerating meds well. Pt denies cp/sob/ha/vision or neuro changes. Checking at home and is well controlled.     HLD is tx with simvastatin which she tolerates well. She has aortic atherosclerosis and is on appropriate risk mitigation tx.    Pt has osteoporosis. She has not tolerated actonel due to CP in past, presumably esophagitis. She is not willing to try another bisphosphonate either oral or IV. We discussed prolia but she declines. She understands r/b of this. She is engaged in resistance exercises.      Pt had symptomatic SVT s/p ablation and has not had recurrence.      Diabetes   Pertinent negatives for hypoglycemia include no dizziness or headaches. Pertinent negatives for diabetes include no chest pain and no polyuria.   Medication Refill   Pertinent negatives include no chest pain or headaches.   Hypertension   Pertinent negatives include no chest pain, headaches or shortness of breath.     Review of Systems   Constitutional: Negative for unexpected weight change.   Eyes: Negative for visual disturbance.    Respiratory: Negative for shortness of breath.    Cardiovascular: Negative for chest pain.   Gastrointestinal: Negative for blood in stool.   Endocrine: Negative for polyuria.   Genitourinary: Negative for frequency.   Neurological: Negative for dizziness, light-headedness and headaches.   Psychiatric/Behavioral: Negative for dysphoric mood.       Objective:          Assessment:       1. Essential hypertension    2. Aortic atherosclerosis    3. Mixed hyperlipidemia    4. Supraventricular tachycardia    5. Status post catheter ablation of slow pathway    6. Venous insufficiency (chronic) (peripheral)    7. Diabetic peripheral neuropathy associated with type 1 diabetes mellitus    8. Hyperthyroidism    9. Hashimoto's disease    10. Osteoporosis, post-menopausal        Plan:       1. Appropriate labs     2. Keep f/u with endocrinology  3. Keep f/u with specialists  4. Home BP and BS monitoring  5. DEXA            Physical Exam   Constitutional: She is oriented to person, place, and time. She appears well-developed and well-nourished.   HENT:   Head: Normocephalic and atraumatic.   Right Ear: Hearing, tympanic membrane and ear canal normal.   Left Ear: Hearing, tympanic membrane and ear canal normal.   Mouth/Throat: No oropharyngeal exudate or posterior oropharyngeal erythema.   Eyes: Pupils are equal, round, and reactive to light. EOM are normal.   Neck: Neck supple. Carotid bruit is not present. No thyroid mass and no thyromegaly present.   Cardiovascular: Normal rate, regular rhythm, S1 normal, S2 normal and normal heart sounds.   No murmur heard.  Pulses:       Radial pulses are 2+ on the right side, and 2+ on the left side.   Pulmonary/Chest: Effort normal and breath sounds normal. She has no wheezes.   Abdominal: Soft. Bowel sounds are normal. She exhibits no distension and no mass. There is no hepatosplenomegaly. There is no tenderness.   Musculoskeletal: She exhibits no edema.        Right foot: Normal.         Left foot: Normal.   Lymphadenopathy:     She has no cervical adenopathy.   Neurological: She is alert and oriented to person, place, and time. She has normal strength. No cranial nerve deficit or sensory deficit. She displays a negative Romberg sign. Coordination and gait normal.   Psychiatric: She has a normal mood and affect. Her behavior is normal.

## 2020-01-25 ENCOUNTER — TELEPHONE (OUTPATIENT)
Dept: INTERNAL MEDICINE | Facility: CLINIC | Age: 70
End: 2020-01-25

## 2020-01-25 NOTE — TELEPHONE ENCOUNTER
Inform pt that thyroid and other labs look good except that A1c increased to 8.1. Therefore, she needs to f/u with endocrinology and keep a close log of sugars first thing in AM before eating, before each meal and at bedtime.

## 2020-01-27 ENCOUNTER — IMMUNIZATION (OUTPATIENT)
Dept: PHARMACY | Facility: CLINIC | Age: 70
End: 2020-01-27
Payer: MEDICARE

## 2020-01-27 NOTE — TELEPHONE ENCOUNTER
Notified pt of results per Dr Ford message below.  Pt voice understanding.  Per her request mailed the lab results to patient.

## 2020-02-02 DIAGNOSIS — E10.9 TYPE 1 DIABETES MELLITUS NOT AT GOAL: ICD-10-CM

## 2020-02-03 RX ORDER — INSULIN DEGLUDEC 100 U/ML
INJECTION, SOLUTION SUBCUTANEOUS
Qty: 6 ML | Refills: 3 | Status: SHIPPED | OUTPATIENT
Start: 2020-02-03 | End: 2020-02-03 | Stop reason: SDUPTHER

## 2020-02-03 RX ORDER — INSULIN DEGLUDEC 100 U/ML
INJECTION, SOLUTION SUBCUTANEOUS
Qty: 6 ML | Refills: 3 | Status: SHIPPED | OUTPATIENT
Start: 2020-02-03 | End: 2021-01-11 | Stop reason: SDUPTHER

## 2020-02-03 NOTE — TELEPHONE ENCOUNTER
----- Message from Wiley Randle sent at 2/3/2020  8:50 AM CST -----  Contact: LICHA CHENEY [[id number]]  [facility] [phone]    Please refill the medication(s) listed below. The patient can be reached at this phone number once it is called into the pharmacy.    Medication #1  TRESIBA FLEXTOUCH U-100 100 unit/mL (3 mL) InPn        Preferred Pharmacy: [facility] - [place] [address] [address]

## 2020-02-03 NOTE — TELEPHONE ENCOUNTER
Pt would like feedback on lab results on 1/24/2020. She has concerns with her abnormal results. Message routed to provider

## 2020-02-06 DIAGNOSIS — I10 HYPERTENSION, UNSPECIFIED TYPE: ICD-10-CM

## 2020-02-06 RX ORDER — ENALAPRIL MALEATE 20 MG/1
TABLET ORAL
Qty: 90 TABLET | Refills: 3 | Status: SHIPPED | OUTPATIENT
Start: 2020-02-06 | End: 2021-01-29

## 2020-02-06 NOTE — TELEPHONE ENCOUNTER
----- Message from Sangeetha Gagnon sent at 2/6/2020  8:27 AM CST -----  Contact: LICHA CHENEY   Please refill the medication(s) listed below. The patient can be reached at this phone number once it is called into the pharmacy.    Medication #1: enalapril (VASOTEC) 20 MG tablet    Medication #2    Preferred Pharmacy: [facility] [address] [place] [address] [address]

## 2020-02-14 DIAGNOSIS — E10.40 DIABETIC NEUROPATHY, TYPE I DIABETES MELLITUS: ICD-10-CM

## 2020-02-14 RX ORDER — INSULIN ASPART 100 [IU]/ML
INJECTION, SOLUTION INTRAVENOUS; SUBCUTANEOUS
Qty: 30 ML | Refills: 3 | Status: SHIPPED | OUTPATIENT
Start: 2020-02-14 | End: 2020-02-17

## 2020-02-14 NOTE — TELEPHONE ENCOUNTER
----- Message from Sally Buck sent at 2/14/2020 12:24 PM CST -----  Contact: LICHA CHENEY [[id number]]  Can the clinic reply in [facility]: no      Please refill the medication(s) listed below. Please call the patient when the prescription(s) is ready for  at this phone number   [phone]      Medication #1 insulin aspart U-100 (NOVOLOG FLEXPEN U-100 INSULIN) 100 unit/mL InPn pen    Medication #2       Preferred Pharmacy: [facility] [address]

## 2020-02-17 DIAGNOSIS — E10.42 DIABETIC PERIPHERAL NEUROPATHY ASSOCIATED WITH TYPE 1 DIABETES MELLITUS: Primary | ICD-10-CM

## 2020-02-17 RX ORDER — INSULIN LISPRO 100 [IU]/ML
6 INJECTION, SOLUTION INTRAVENOUS; SUBCUTANEOUS
Qty: 6 ML | Refills: 5 | Status: SHIPPED | OUTPATIENT
Start: 2020-02-17 | End: 2021-01-20

## 2020-02-17 NOTE — TELEPHONE ENCOUNTER
----- Message from Liz Benoit sent at 2/17/2020  8:55 AM CST -----  Contact: Self / [phone]  Type: Patient Call Back    Who called:  Patient    What is the request in detail:  Patient is needing staff to give her a call ASAP.  She have questions regarding her Flexpen and her Novolog.  Thankyou     Would the patient rather a call back or a response via [facility]?   Call back    Best call back number:   [phone]

## 2020-02-17 NOTE — TELEPHONE ENCOUNTER
Insurance covers humalog instead of novolog insulin in a flexpen.   Patient wants it sent to the [facility] on [address].  Can we send this in as a 30 day supply?

## 2020-05-08 DIAGNOSIS — E11.9 TYPE 2 DIABETES MELLITUS WITHOUT COMPLICATION: ICD-10-CM

## 2020-05-08 DIAGNOSIS — E78.5 HYPERLIPIDEMIA, UNSPECIFIED HYPERLIPIDEMIA TYPE: ICD-10-CM

## 2020-05-08 RX ORDER — SIMVASTATIN 20 MG/1
TABLET, FILM COATED ORAL
Qty: 90 TABLET | Refills: 2 | Status: SHIPPED | OUTPATIENT
Start: 2020-05-08 | End: 2021-02-04

## 2020-05-21 ENCOUNTER — TELEPHONE (OUTPATIENT)
Dept: PRIMARY CARE CLINIC | Facility: CLINIC | Age: 70
End: 2020-05-21

## 2020-05-21 ENCOUNTER — PATIENT OUTREACH (OUTPATIENT)
Dept: ADMINISTRATIVE | Facility: HOSPITAL | Age: 70
End: 2020-05-21

## 2020-05-21 NOTE — TELEPHONE ENCOUNTER
----- Message from Rachele Moscoso MA sent at 5/21/2020  9:41 AM CDT -----  Regarding: Appointments:   Good morning,  Please mail out all pt's scheduled appointments.    Thanks so much...  Rachele

## 2020-05-28 DIAGNOSIS — E05.90 HYPERTHYROIDISM: ICD-10-CM

## 2020-05-28 RX ORDER — METHIMAZOLE 5 MG/1
5 TABLET ORAL DAILY
Qty: 90 TABLET | Refills: 2 | Status: SHIPPED | OUTPATIENT
Start: 2020-05-28 | End: 2021-03-11

## 2020-05-28 NOTE — TELEPHONE ENCOUNTER
----- Message from Shirley Batista sent at 5/28/2020 12:23 PM CDT -----  Contact: LICHA CHENEY [[id number]]  Type: RX Refill Request    Who Called: LICHA CHENEY [[id number]]    RX Name and Strength: methIMAzole (TAPAZOLE) 5 MG Tab    Preferred Pharmacy with phone number: [facility] [address] [place] [address] [address]    Best Call Back Number: [phone]    Additional Information: N/A

## 2020-06-04 ENCOUNTER — HOSPITAL ENCOUNTER (OUTPATIENT)
Dept: RADIOLOGY | Facility: OTHER | Age: 70
Discharge: HOME | End: 2020-06-04
Attending: INTERNAL MEDICINE
Payer: MEDICARE

## 2020-06-04 ENCOUNTER — OFFICE VISIT (OUTPATIENT)
Dept: INTERNAL MEDICINE | Facility: CLINIC | Age: 70
End: 2020-06-04
Payer: MEDICARE

## 2020-06-04 VITALS
WEIGHT: 108.69 LBS | BODY MASS INDEX: 19.26 KG/M2 | DIASTOLIC BLOOD PRESSURE: 68 MMHG | HEART RATE: 55 BPM | SYSTOLIC BLOOD PRESSURE: 146 MMHG | OXYGEN SATURATION: 99 % | HEIGHT: 63 IN

## 2020-06-04 DIAGNOSIS — I70.0 AORTIC ATHEROSCLEROSIS: ICD-10-CM

## 2020-06-04 DIAGNOSIS — M81.0 OSTEOPOROSIS, POST-MENOPAUSAL: ICD-10-CM

## 2020-06-04 DIAGNOSIS — I87.2 VENOUS INSUFFICIENCY (CHRONIC) (PERIPHERAL): Chronic | ICD-10-CM

## 2020-06-04 DIAGNOSIS — I10 ESSENTIAL HYPERTENSION: Primary | Chronic | ICD-10-CM

## 2020-06-04 DIAGNOSIS — I47.10 SUPRAVENTRICULAR TACHYCARDIA: ICD-10-CM

## 2020-06-04 DIAGNOSIS — E78.2 MIXED HYPERLIPIDEMIA: Chronic | ICD-10-CM

## 2020-06-04 DIAGNOSIS — E10.42 DIABETIC PERIPHERAL NEUROPATHY ASSOCIATED WITH TYPE 1 DIABETES MELLITUS: ICD-10-CM

## 2020-06-04 DIAGNOSIS — M81.0 OSTEOPOROSIS, POST-MENOPAUSAL: Chronic | ICD-10-CM

## 2020-06-04 DIAGNOSIS — E05.90 HYPERTHYROIDISM: ICD-10-CM

## 2020-06-04 PROCEDURE — 3077F SYST BP >= 140 MM HG: CPT | Mod: CPTII,S$GLB,, | Performed by: INTERNAL MEDICINE

## 2020-06-04 PROCEDURE — 99999 PR PBB SHADOW E&M-EST. PATIENT-LVL III: CPT | Mod: PBBFAC,,, | Performed by: INTERNAL MEDICINE

## 2020-06-04 PROCEDURE — 99499 RISK ADDL DX/OHS AUDIT: ICD-10-PCS | Mod: S$GLB,,, | Performed by: INTERNAL MEDICINE

## 2020-06-04 PROCEDURE — 99214 PR OFFICE/OUTPT VISIT, EST, LEVL IV, 30-39 MIN: ICD-10-PCS | Mod: S$GLB,,, | Performed by: INTERNAL MEDICINE

## 2020-06-04 PROCEDURE — 3078F DIAST BP <80 MM HG: CPT | Mod: CPTII,S$GLB,, | Performed by: INTERNAL MEDICINE

## 2020-06-04 PROCEDURE — 3077F PR MOST RECENT SYSTOLIC BLOOD PRESSURE >= 140 MM HG: ICD-10-PCS | Mod: CPTII,S$GLB,, | Performed by: INTERNAL MEDICINE

## 2020-06-04 PROCEDURE — 77080 DXA BONE DENSITY AXIAL: CPT | Mod: TC

## 2020-06-04 PROCEDURE — 3052F HG A1C>EQUAL 8.0%<EQUAL 9.0%: CPT | Mod: CPTII,S$GLB,, | Performed by: INTERNAL MEDICINE

## 2020-06-04 PROCEDURE — 1159F PR MEDICATION LIST DOCUMENTED IN MEDICAL RECORD: ICD-10-PCS | Mod: S$GLB,,, | Performed by: INTERNAL MEDICINE

## 2020-06-04 PROCEDURE — 77080 DXA BONE DENSITY AXIAL: CPT | Mod: 26,,, | Performed by: RADIOLOGY

## 2020-06-04 PROCEDURE — 1101F PT FALLS ASSESS-DOCD LE1/YR: CPT | Mod: CPTII,S$GLB,, | Performed by: INTERNAL MEDICINE

## 2020-06-04 PROCEDURE — 77080 DEXA BONE DENSITY SPINE HIP: ICD-10-PCS | Mod: 26,,, | Performed by: RADIOLOGY

## 2020-06-04 PROCEDURE — 1101F PR PT FALLS ASSESS DOC 0-1 FALLS W/OUT INJ PAST YR: ICD-10-PCS | Mod: CPTII,S$GLB,, | Performed by: INTERNAL MEDICINE

## 2020-06-04 PROCEDURE — 99499 UNLISTED E&M SERVICE: CPT | Mod: S$GLB,,, | Performed by: INTERNAL MEDICINE

## 2020-06-04 PROCEDURE — 1126F PR PAIN SEVERITY QUANTIFIED, NO PAIN PRESENT: ICD-10-PCS | Mod: S$GLB,,, | Performed by: INTERNAL MEDICINE

## 2020-06-04 PROCEDURE — 99214 OFFICE O/P EST MOD 30 MIN: CPT | Mod: S$GLB,,, | Performed by: INTERNAL MEDICINE

## 2020-06-04 PROCEDURE — 3052F PR MOST RECENT HEMOGLOBIN A1C LEVEL 8.0 - < 9.0%: ICD-10-PCS | Mod: CPTII,S$GLB,, | Performed by: INTERNAL MEDICINE

## 2020-06-04 PROCEDURE — 1159F MED LIST DOCD IN RCRD: CPT | Mod: S$GLB,,, | Performed by: INTERNAL MEDICINE

## 2020-06-04 PROCEDURE — 3078F PR MOST RECENT DIASTOLIC BLOOD PRESSURE < 80 MM HG: ICD-10-PCS | Mod: CPTII,S$GLB,, | Performed by: INTERNAL MEDICINE

## 2020-06-04 PROCEDURE — 1126F AMNT PAIN NOTED NONE PRSNT: CPT | Mod: S$GLB,,, | Performed by: INTERNAL MEDICINE

## 2020-06-04 PROCEDURE — 99999 PR PBB SHADOW E&M-EST. PATIENT-LVL III: ICD-10-PCS | Mod: PBBFAC,,, | Performed by: INTERNAL MEDICINE

## 2020-06-04 NOTE — PROGRESS NOTES
Subjective:       Patient ID: Sarita Alvarez is a 70 y.o. female.    Chief Complaint: Hypertension    Pt here for f/u. Pt has DM1 and is followed by endocrinology. She is on Tresiba 10 units and novolog 3-4 units plus SSI per carb counting. She continues to self-adjust insulin. Her last A1C was 8.1. She has complications of diabetes that includes neuropathy in feet. This is tolerable without meds. No recent lows. Her fasting sugars are 110s-130s and prandial sugars are under 180. She is still fearful of hypoglycemia so underdoses insulin at times. Due for eye exam which she will schedule.    She also sees endocrinology for hyperthyroidism/hashimotos. She is on tapazole and monitored regularly by endocrinology but is overdue. She is clinically euthyroid.     She has varicose veins that cause some swelling at times but manages with compression hose. Has seen vascular in past.     Pt's BP is well controlled. Tolerating meds well. Pt denies cp/sob/ha/vision or neuro changes. Checking at home and is well controlled.     HLD is tx with simvastatin which she tolerates well. She has aortic atherosclerosis and is on appropriate risk mitigation tx.    Pt has osteoporosis but most recent DEXA 6/2020 showed osteopenia. She has not tolerated actonel due to CP in past, presumably esophagitis. She is not willing to try another bisphosphonate either oral or IV. We discussed prolia but she declines. She understands r/b of this. She is engaged in resistance exercises.      Pt had symptomatic SVT s/p ablation and has not had recurrence.      Diabetes   Pertinent negatives for hypoglycemia include no dizziness or headaches. Pertinent negatives for diabetes include no chest pain and no polyuria.   Medication Refill   Pertinent negatives include no chest pain or headaches.   Hypertension   Pertinent negatives include no chest pain, headaches or shortness of breath.     Review of Systems   Constitutional: Negative for unexpected  weight change.   Eyes: Negative for visual disturbance.   Respiratory: Negative for shortness of breath.    Cardiovascular: Negative for chest pain.   Gastrointestinal: Negative for blood in stool.   Endocrine: Negative for polyuria.   Genitourinary: Negative for frequency.   Neurological: Negative for dizziness, light-headedness and headaches.   Psychiatric/Behavioral: Negative for dysphoric mood.       Objective:          Assessment:       1. Essential hypertension    2. Mixed hyperlipidemia    3. Supraventricular tachycardia    4. Aortic atherosclerosis    5. Venous insufficiency (chronic) (peripheral)    6. Diabetic peripheral neuropathy associated with type 1 diabetes mellitus    7. Hyperthyroidism    8. Osteoporosis, post-menopausal        Plan:       1. Appropriate labs     2. Keep f/u with specialists including endocrinology  3. Home BP and BS monitoring  4. She continues to self-adjust insulin so we discussed importance of f/u with endocrinology          Physical Exam   Constitutional: She is oriented to person, place, and time. She appears well-developed and well-nourished.   HENT:   Head: Normocephalic and atraumatic.   Right Ear: Hearing, tympanic membrane and ear canal normal.   Left Ear: Hearing, tympanic membrane and ear canal normal.   Mouth/Throat: No oropharyngeal exudate or posterior oropharyngeal erythema.   Eyes: Pupils are equal, round, and reactive to light. EOM are normal.   Neck: Neck supple. Carotid bruit is not present. No thyroid mass and no thyromegaly present.   Cardiovascular: Normal rate, regular rhythm, S1 normal, S2 normal and normal heart sounds.   No murmur heard.  Pulses:       Radial pulses are 2+ on the right side, and 2+ on the left side.        Posterior tibial pulses are 2+ on the right side, and 2+ on the left side.   Pulmonary/Chest: Effort normal and breath sounds normal. She has no wheezes.   Abdominal: Soft. Bowel sounds are normal. She exhibits no distension and no  mass. There is no hepatosplenomegaly. There is no tenderness.   Musculoskeletal: She exhibits no edema.        Right foot: Normal. There is no deformity.        Left foot: Normal. There is no deformity.   Feet:   Right Foot:   Protective Sensation: 6 sites tested. 6 sites sensed.   Skin Integrity: Negative for ulcer, blister or skin breakdown.   Left Foot:   Protective Sensation: 6 sites tested. 6 sites sensed.   Skin Integrity: Negative for ulcer, blister or skin breakdown.   Lymphadenopathy:     She has no cervical adenopathy.   Neurological: She is alert and oriented to person, place, and time. She has normal strength. No cranial nerve deficit or sensory deficit. She displays a negative Romberg sign. Coordination and gait normal.   Psychiatric: She has a normal mood and affect. Her behavior is normal.

## 2020-06-05 ENCOUNTER — TELEPHONE (OUTPATIENT)
Dept: INTERNAL MEDICINE | Facility: CLINIC | Age: 70
End: 2020-06-05

## 2020-06-05 ENCOUNTER — PATIENT OUTREACH (OUTPATIENT)
Dept: ADMINISTRATIVE | Facility: HOSPITAL | Age: 70
End: 2020-06-05

## 2020-06-05 NOTE — TELEPHONE ENCOUNTER
Spoke to Pt and she verbally understood results and already is scheduled to see endocrinology on 06/16

## 2020-06-05 NOTE — TELEPHONE ENCOUNTER
Inform pt that A1c came down some to 7.7 which is good. Still needs f/u with endocrinology; please assist scheduling if needed.

## 2020-06-15 NOTE — PROGRESS NOTES
Spoke to patient and confirm there appointment and offered Virtual Video appointment.

## 2020-06-16 ENCOUNTER — OFFICE VISIT (OUTPATIENT)
Dept: ENDOCRINOLOGY | Facility: CLINIC | Age: 70
End: 2020-06-16
Payer: MEDICARE

## 2020-06-16 ENCOUNTER — PATIENT OUTREACH (OUTPATIENT)
Dept: ADMINISTRATIVE | Facility: OTHER | Age: 70
End: 2020-06-16

## 2020-06-16 VITALS
BODY MASS INDEX: 19.23 KG/M2 | HEIGHT: 63 IN | WEIGHT: 108.56 LBS | SYSTOLIC BLOOD PRESSURE: 118 MMHG | DIASTOLIC BLOOD PRESSURE: 72 MMHG

## 2020-06-16 DIAGNOSIS — Z12.31 ENCOUNTER FOR SCREENING MAMMOGRAM FOR MALIGNANT NEOPLASM OF BREAST: Primary | ICD-10-CM

## 2020-06-16 DIAGNOSIS — E10.42 DIABETIC PERIPHERAL NEUROPATHY ASSOCIATED WITH TYPE 1 DIABETES MELLITUS: ICD-10-CM

## 2020-06-16 DIAGNOSIS — E13.9 LATENT AUTOIMMUNE DIABETES IN ADULTS (LADA), MANAGED AS TYPE 1: ICD-10-CM

## 2020-06-16 DIAGNOSIS — E05.90 HYPERTHYROIDISM: Primary | ICD-10-CM

## 2020-06-16 DIAGNOSIS — E78.2 MIXED HYPERLIPIDEMIA: Chronic | ICD-10-CM

## 2020-06-16 PROCEDURE — 99499 RISK ADDL DX/OHS AUDIT: ICD-10-PCS | Mod: S$GLB,,, | Performed by: INTERNAL MEDICINE

## 2020-06-16 PROCEDURE — 99499 UNLISTED E&M SERVICE: CPT | Mod: S$GLB,,, | Performed by: INTERNAL MEDICINE

## 2020-06-16 PROCEDURE — 99999 PR PBB SHADOW E&M-EST. PATIENT-LVL V: CPT | Mod: PBBFAC,,, | Performed by: INTERNAL MEDICINE

## 2020-06-16 PROCEDURE — 3074F PR MOST RECENT SYSTOLIC BLOOD PRESSURE < 130 MM HG: ICD-10-PCS | Mod: CPTII,S$GLB,, | Performed by: INTERNAL MEDICINE

## 2020-06-16 PROCEDURE — 99214 OFFICE O/P EST MOD 30 MIN: CPT | Mod: S$GLB,,, | Performed by: INTERNAL MEDICINE

## 2020-06-16 PROCEDURE — 1101F PT FALLS ASSESS-DOCD LE1/YR: CPT | Mod: CPTII,S$GLB,, | Performed by: INTERNAL MEDICINE

## 2020-06-16 PROCEDURE — 99999 PR PBB SHADOW E&M-EST. PATIENT-LVL V: ICD-10-PCS | Mod: PBBFAC,,, | Performed by: INTERNAL MEDICINE

## 2020-06-16 PROCEDURE — 1159F PR MEDICATION LIST DOCUMENTED IN MEDICAL RECORD: ICD-10-PCS | Mod: S$GLB,,, | Performed by: INTERNAL MEDICINE

## 2020-06-16 PROCEDURE — 1101F PR PT FALLS ASSESS DOC 0-1 FALLS W/OUT INJ PAST YR: ICD-10-PCS | Mod: CPTII,S$GLB,, | Performed by: INTERNAL MEDICINE

## 2020-06-16 PROCEDURE — 3051F HG A1C>EQUAL 7.0%<8.0%: CPT | Mod: CPTII,S$GLB,, | Performed by: INTERNAL MEDICINE

## 2020-06-16 PROCEDURE — 1126F PR PAIN SEVERITY QUANTIFIED, NO PAIN PRESENT: ICD-10-PCS | Mod: S$GLB,,, | Performed by: INTERNAL MEDICINE

## 2020-06-16 PROCEDURE — 3074F SYST BP LT 130 MM HG: CPT | Mod: CPTII,S$GLB,, | Performed by: INTERNAL MEDICINE

## 2020-06-16 PROCEDURE — 3078F DIAST BP <80 MM HG: CPT | Mod: CPTII,S$GLB,, | Performed by: INTERNAL MEDICINE

## 2020-06-16 PROCEDURE — 1159F MED LIST DOCD IN RCRD: CPT | Mod: S$GLB,,, | Performed by: INTERNAL MEDICINE

## 2020-06-16 PROCEDURE — 99214 PR OFFICE/OUTPT VISIT, EST, LEVL IV, 30-39 MIN: ICD-10-PCS | Mod: S$GLB,,, | Performed by: INTERNAL MEDICINE

## 2020-06-16 PROCEDURE — 1126F AMNT PAIN NOTED NONE PRSNT: CPT | Mod: S$GLB,,, | Performed by: INTERNAL MEDICINE

## 2020-06-16 PROCEDURE — 3078F PR MOST RECENT DIASTOLIC BLOOD PRESSURE < 80 MM HG: ICD-10-PCS | Mod: CPTII,S$GLB,, | Performed by: INTERNAL MEDICINE

## 2020-06-16 PROCEDURE — 3051F PR MOST RECENT HEMOGLOBIN A1C LEVEL 7.0 - < 8.0%: ICD-10-PCS | Mod: CPTII,S$GLB,, | Performed by: INTERNAL MEDICINE

## 2020-06-16 NOTE — PROGRESS NOTES
Patient's chart was reviewed.   Requested updates within Care Everywhere.  Immunizations reconciled.    Health Maintenance was updated.  Optometry appt 07/15/2020

## 2020-06-16 NOTE — PROGRESS NOTES
Subjective:      Patient ID: Sarita Alvarez is a 70 y.o. female.    Chief Complaint:  Diabetes and Hyperthyroidism      History of Present Illness  Ms. Alvarez presents for follow up of NILAY treated as type 1 and hyperthyroidism secondary to Grave's disease.     Diagnosed with gestational DM at age 42. In 1997, she was diagnosed with T2DM, however workup revealed NILAY. Last visit with PATRICIA Yost NP in 2018.     , has children, grandchildren, retired.       In regards to diabetes:    No hospitalizations or illnesses since last visit with me.      Checks BG 6-8 times per day (fasting, pre-meal and 1-2 hrs post-meal)  Reviewed meter, but time and date not correct:  76, 65, ,275, 293, 155, 203, 145, 105    No hypoglycemia or hypoglycemic s/s recently.      Eats breakfast, lunch, and dinner. Counts carbs for meals/snacks.      Doesn't miss any insulin doses.      No interest in insulin pumps.      CURRENT DIABETIC MEDS: Tresiba 10 units QAM, Novolog 2-4 units based 1:13 I:C ratio, goal 100, ISF 50  Very sensitive to insulin     Uses Vials or Pens: Pens  Type of Glucose Meter: Relion Meter     Last Eye Exam: April 2018, no retinopathy  Last Podiatry Exam: 6/2020  DM Education: May 2017    In regards to hyperthyroidism:  Diagnosed with Grave's disease in 1/2013.     Initial labs at diagnosis:  Results for SARITA ALVAREZ (MRN [id number]) as of 6/16/2020 09:06   Ref. Range 1/7/2013 09:16 2/5/2013 16:20   TSH Latest Ref Range: 0.4 - 4.0 uIU/ml <0.010 (L)    Free T4 Latest Ref Range: 0.71 - 1.51 ng/dl 2.43 (H)    Thyroperoxidase Antibodies Latest Ref Range: 0 - 6 IU/mL  740.7 (H)   Thyroid-Stim IG Quantitative Latest Ref Range: <140 %  373 (H)     Currently on methimazole 5 mg once daily.   Denies palpitations or tremor.      Review of Systems   Constitutional: Negative for chills and fever.   Gastrointestinal: Negative for nausea.   No CP  No SOB    Objective:   Physical Exam  Vitals signs and nursing note  reviewed.     Protective Sensation (w/ 10 gram monofilament):  Right: Intact  Left: Intact    Visual Inspection:  Normal -  Bilateral    Pedal Pulses:   Right: Present  Left: Present    Posterior tibialis:   Right:Present  Left: Present    Decreased vibratory sense in bilateral feet    No thyromegaly  No tremor  DTR's 2 +    BP Readings from Last 3 Encounters:   06/16/20 118/72   06/04/20 (!) 146/68   01/24/20 120/70     Wt Readings from Last 1 Encounters:   06/16/20 0914 49.2 kg (108 lb 9.2 oz)       Body mass index is 19.23 kg/m².    Lab Review:   Lab Results   Component Value Date    HGBA1C 7.7 (H) 06/04/2020     Lab Results   Component Value Date    CHOL 170 01/24/2020    HDL 78 (H) 01/24/2020    LDLCALC 83.4 01/24/2020    TRIG 43 01/24/2020    CHOLHDL 45.9 01/24/2020     Lab Results   Component Value Date     01/24/2020    K 4.1 01/24/2020     01/24/2020    CO2 29 01/24/2020     (H) 01/24/2020    BUN 16 01/24/2020    CREATININE 0.9 01/24/2020    CALCIUM 9.6 01/24/2020    PROT 7.0 01/24/2020    ALBUMIN 3.8 01/24/2020    BILITOT 0.4 01/24/2020    ALKPHOS 76 01/24/2020    AST 15 01/24/2020    ALT 16 01/24/2020    ANIONGAP 9 01/24/2020    ESTGFRAFRICA >60 01/24/2020    EGFRNONAA >60 01/24/2020    TSH 2.775 01/24/2020         Assessment and Plan     Hyperthyroidism  --Patient with hyperthyroidism secondary to Grave's disease  --She has been on methimazole for may be a candidate to wean off of methimazole and see if she is in remission  --Will check TSH and TRAb with next set of labs to see if she can be weaned off of methimazole  --continue methimazole 5 mg daily pending repeat labs    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), managed as type 1  --Patient with NILAY treated as type 1 diabetes  --A1c goal <7.5%  --Recent goal slightly above goal  --Will continue Tresiba 10 units daily  --Will continue Humalog TID AC at 1:13 ICR  --Unable to use glucose meter data reliably as the time and date was  wrong on her meter  --Will get professional CGM when able then make adjustments to insulin based on that  --Refer to DE  --Foot exam done today  --Has upcoming appt with optometry for eye exam  --will send glucagon emergency kit to pharmacy    Mixed hyperlipidemia  --On statin per ADA recs    Diabetic peripheral neuropathy associated with type 1 diabetes mellitus  --intact to monofilament, but decreased vibratory sense  --No history of foot ulcer  --Better long term blood glucose control to prevent progression    Follow up in 10/2020    Ronald Koch M.D. Staff Endocrinology

## 2020-06-17 ENCOUNTER — TELEPHONE (OUTPATIENT)
Dept: INTERNAL MEDICINE | Facility: CLINIC | Age: 70
End: 2020-06-17

## 2020-06-17 RX ORDER — GLUCAGON 1 MG
1 VIAL (EA) INJECTION
Qty: 1 EACH | Refills: 1 | Status: SHIPPED | OUTPATIENT
Start: 2020-06-17 | End: 2021-06-17

## 2020-06-17 NOTE — ASSESSMENT & PLAN NOTE
--Patient with hyperthyroidism secondary to Grave's disease  --She has been on methimazole for may be a candidate to wean off of methimazole and see if she is in remission  --Will check TSH and TRAb with next set of labs to see if she can be weaned off of methimazole  --continue methimazole 5 mg daily pending repeat labs

## 2020-06-17 NOTE — ASSESSMENT & PLAN NOTE
--intact to monofilament, but decreased vibratory sense  --No history of foot ulcer  --Better long term blood glucose control to prevent progression

## 2020-06-17 NOTE — ASSESSMENT & PLAN NOTE
--Patient with NILAY treated as type 1 diabetes  --A1c goal <7.5%  --Recent goal slightly above goal  --Will continue Tresiba 10 units daily  --Will continue Humalog TID AC at 1:13 ICR  --Unable to use glucose meter data reliably as the time and date was wrong on her meter  --Will get professional CGM when able then make adjustments to insulin based on that  --Refer to DE  --Foot exam done today  --Has upcoming appt with optometry for eye exam  --will send glucagon emergency kit to pharmacy

## 2020-06-17 NOTE — TELEPHONE ENCOUNTER
----- Message from Celia Barrera sent at 6/17/2020  2:27 PM CDT -----  Regarding: self [phone]  .Type: Patient Call Back    Who called: self     What is the request in detail: Pt stated that she was seen on yesterday by Dr. Dr Koch & her BP was in normal limits, Pt needs to know if she should continue to come in on for a BP Fu on tomorrow     Can the clinic reply by [facility]? Call back     Would the patient rather a call back or a response via [facility]? Call back     Best call back number:  [phone]

## 2020-07-14 ENCOUNTER — PATIENT OUTREACH (OUTPATIENT)
Dept: ADMINISTRATIVE | Facility: OTHER | Age: 70
End: 2020-07-14

## 2020-07-15 ENCOUNTER — OFFICE VISIT (OUTPATIENT)
Dept: OPTOMETRY | Facility: CLINIC | Age: 70
End: 2020-07-15
Payer: MEDICARE

## 2020-07-15 DIAGNOSIS — H52.203 MYOPIA WITH ASTIGMATISM AND PRESBYOPIA, BILATERAL: ICD-10-CM

## 2020-07-15 DIAGNOSIS — H25.13 NUCLEAR SCLEROSIS OF BOTH EYES: ICD-10-CM

## 2020-07-15 DIAGNOSIS — H52.4 MYOPIA WITH ASTIGMATISM AND PRESBYOPIA, BILATERAL: ICD-10-CM

## 2020-07-15 DIAGNOSIS — E10.9 TYPE 1 DIABETES MELLITUS WITHOUT RETINOPATHY: Primary | ICD-10-CM

## 2020-07-15 DIAGNOSIS — H52.13 MYOPIA WITH ASTIGMATISM AND PRESBYOPIA, BILATERAL: ICD-10-CM

## 2020-07-15 PROCEDURE — 92014 PR EYE EXAM, EST PATIENT,COMPREHESV: ICD-10-PCS | Mod: S$GLB,,, | Performed by: OPTOMETRIST

## 2020-07-15 PROCEDURE — 92014 COMPRE OPH EXAM EST PT 1/>: CPT | Mod: S$GLB,,, | Performed by: OPTOMETRIST

## 2020-07-15 PROCEDURE — 99499 UNLISTED E&M SERVICE: CPT | Mod: S$GLB,,, | Performed by: OPTOMETRIST

## 2020-07-15 PROCEDURE — 99999 PR PBB SHADOW E&M-EST. PATIENT-LVL III: CPT | Mod: PBBFAC,,, | Performed by: OPTOMETRIST

## 2020-07-15 PROCEDURE — 99499 RISK ADDL DX/OHS AUDIT: ICD-10-PCS | Mod: S$GLB,,, | Performed by: OPTOMETRIST

## 2020-07-15 PROCEDURE — 99999 PR PBB SHADOW E&M-EST. PATIENT-LVL III: ICD-10-PCS | Mod: PBBFAC,,, | Performed by: OPTOMETRIST

## 2020-07-15 NOTE — LETTER
July 15, 2020      Venu Ford MD  [address]  [facility] [address]           [facility] [address]  [address]  [place] [address]  Phone: [phone]  Fax: [phone]          Patient: Sarita Alvarez   MR Number: [id number]   YOB: 1950   Date of Visit: 7/15/2020       Dear Dr. Venu Ford:    Thank you for referring Sarita Alvarez to me for evaluation. Attached you will find relevant portions of my assessment and plan of care.    If you have questions, please do not hesitate to call me. I look forward to following Sairta Alvarez along with you.    Sincerely,    Leidy Rosas, OD    Enclosure  CC:  No Recipients    If you would like to receive this communication electronically, please contact [email or web address] or [phone] to request more information on [employer] access.    For providers and/or their staff who would like to refer a patient to [facility], please contact us through our one-stop-shop provider referral line, [facility], at [phone].    If you feel you have received this communication in error or would no longer like to receive these types of communications, please e-mail [email or web address]

## 2020-07-15 NOTE — PROGRESS NOTES
HPI     Last eye exam was 4/25/18 with Dr. Rosas.  Patient states no vision changes since last exam. Left glasses in her   car-rarely wears them.  Patient denies diplopia, headaches, flashes/floaters, and pain.    Hemoglobin A1C       Date                     Value               Ref Range             Status                06/04/2020               7.7 (H)             4.0 - 5.6 %           Final                  Last edited by Margy Palmer on 7/15/2020  8:13 AM. (History)            Assessment /Plan     For exam results, see Encounter Report.    Type 1 diabetes mellitus without retinopathy    Nuclear sclerosis of both eyes    Myopia with astigmatism and presbyopia, bilateral            1.  No retinopathy--monitor yearly.  BS control.  Eye health normal OU.  2.  Educated on cataracts and affects on vision.  Patient happy with vision.  Monitor.  3.   Continue w/ current rx

## 2020-08-16 ENCOUNTER — PATIENT OUTREACH (OUTPATIENT)
Dept: ADMINISTRATIVE | Facility: OTHER | Age: 70
End: 2020-08-16

## 2020-08-16 NOTE — PROGRESS NOTES
Care Everywhere: updated  Immunization: updtaed  Health Maintenance: updated  Media Review: reviewed for outside mammogram report  Legacy Review:   Order placed:   Upcoming appts:

## 2020-08-18 ENCOUNTER — OFFICE VISIT (OUTPATIENT)
Dept: OBSTETRICS AND GYNECOLOGY | Facility: CLINIC | Age: 70
End: 2020-08-18
Attending: OBSTETRICS & GYNECOLOGY
Payer: MEDICARE

## 2020-08-18 VITALS
HEIGHT: 64 IN | WEIGHT: 107.38 LBS | DIASTOLIC BLOOD PRESSURE: 64 MMHG | BODY MASS INDEX: 18.33 KG/M2 | SYSTOLIC BLOOD PRESSURE: 100 MMHG

## 2020-08-18 DIAGNOSIS — Z78.0 POSTMENOPAUSAL STATUS: ICD-10-CM

## 2020-08-18 DIAGNOSIS — Z01.419 WELL WOMAN EXAM WITH ROUTINE GYNECOLOGICAL EXAM: Primary | ICD-10-CM

## 2020-08-18 DIAGNOSIS — Z12.4 PAP SMEAR FOR CERVICAL CANCER SCREENING: ICD-10-CM

## 2020-08-18 DIAGNOSIS — Z12.31 ENCOUNTER FOR SCREENING MAMMOGRAM FOR BREAST CANCER: ICD-10-CM

## 2020-08-18 PROCEDURE — 88175 CYTOPATH C/V AUTO FLUID REDO: CPT

## 2020-08-18 PROCEDURE — 99999 PR PBB SHADOW E&M-EST. PATIENT-LVL IV: CPT | Mod: PBBFAC,,, | Performed by: OBSTETRICS & GYNECOLOGY

## 2020-08-18 PROCEDURE — G0101 PR CA SCREEN;PELVIC/BREAST EXAM: ICD-10-PCS | Mod: S$GLB,,, | Performed by: OBSTETRICS & GYNECOLOGY

## 2020-08-18 PROCEDURE — 99999 PR PBB SHADOW E&M-EST. PATIENT-LVL IV: ICD-10-PCS | Mod: PBBFAC,,, | Performed by: OBSTETRICS & GYNECOLOGY

## 2020-08-18 PROCEDURE — G0101 CA SCREEN;PELVIC/BREAST EXAM: HCPCS | Mod: S$GLB,,, | Performed by: OBSTETRICS & GYNECOLOGY

## 2020-08-18 NOTE — PROGRESS NOTES
Sarita Alvarez is a 70 y.o. year old  female who presents for routine GYN exam.  She is postmenopausal, not on hormones.  Denies bleeding but reports occasional sweats / flashes.  Reports improved glycemic control over the past year.  She has a history of osteopenia on recent BMD.  No GYN complaints.      Past Medical History:   Diagnosis Date    Cataract     Diabetes mellitus type I     Diabetic peripheral neuropathy 2012    HTN (hypertension) 2012    Hyperlipidemia 2012    Osteoporosis, post-menopausal 2012    Poorly controlled type 1 diabetes mellitus with peripheral neuropathy 2012    Supraventricular tachycardia 14    AVNRT    SVT (supraventricular tachycardia) 2012    Thyroid disease     hyperthyroidism    Venous insufficiency (chronic) (peripheral) 2012       Past Surgical History:   Procedure Laterality Date    BREAST BIOPSY Left 2016    core    COLONOSCOPY  2016    DILATION AND CURETTAGE OF UTERUS      RADIOFREQUENCY ABLATION  14    AVNRT    WISDOM TOOTH EXTRACTION         OB History        3    Para   3    Term   3            AB        Living   3       SAB        TAB        Ectopic        Multiple        Live Births                       ROS:  GENERAL: Feeling well overall.   SKIN: Denies rash or lesions.   HEAD: Denies head injury or headache.   NODES: Denies enlarged lymph nodes.   CHEST: Denies chest pain or shortness of breath.   CARDIOVASCULAR: Denies palpitations or left sided chest pain.   ABDOMEN: No abdominal pain, nausea, vomiting or rectal bleeding.   URINARY: No dysuria or hematuria.  REPRODUCTIVE: See HPI.   BREASTS: Denies pain, lumps, or nipple discharge.   HEMATOLOGIC: No easy bruisability or excessive bleeding.   MUSCULOSKELETAL: Denies joint pain.  NEUROLOGIC: Denies syncope or weakness.   PSYCHIATRIC: Denies depression.     PE:   (chaperone present during entire exam)  APPEARANCE: Well nourished,  well developed, in no acute distress.  BREASTS: Symmetrical, no skin changes or visible lesions. No palpable masses, nipple discharge or adenopathy bilaterally.  ABDOMEN: Soft. No tenderness or masses. No hernias. No CVA tenderness.  VULVA: Atrophic.  No lesions. Normal female genitalia.  URETHRAL MEATUS: Normal size and location, no lesions, no prolapse.  URETHRA: No masses, tenderness, prolapse or scarring.  VAGINA: Atrophic.  No lesions, no abnormal discharge, no significant cystocele or rectocele.  CERVIX: No lesions and discharge. Stenotic. PAP done.  UTERUS: Normal size, regular shape, mobile, non-tender, bladder base nontender.  ADNEXA: No masses, tenderness or CDS nodularity.  ANUS PERINEUM: Normal.    Diagnosis:  1. Well woman exam with routine gynecological exam    2. Postmenopausal status    3. Pap smear for cervical cancer screening    4. Encounter for screening mammogram for breast cancer          PLAN:    Orders Placed This Encounter    Mammo Digital Screening Bilat w/ Aaron    Liquid-Based Pap Smear, Screening       Patient was counseled today on postmenopausal issues.    Follow-up in 1 year.

## 2020-08-20 ENCOUNTER — HOSPITAL ENCOUNTER (OUTPATIENT)
Dept: RADIOLOGY | Facility: HOSPITAL | Age: 70
Discharge: HOME | End: 2020-08-20
Attending: OBSTETRICS & GYNECOLOGY
Payer: MEDICARE

## 2020-08-20 DIAGNOSIS — Z12.31 ENCOUNTER FOR SCREENING MAMMOGRAM FOR BREAST CANCER: ICD-10-CM

## 2020-08-20 PROCEDURE — 77063 MAMMO DIGITAL SCREENING BILAT WITH TOMOSYNTHESIS_CAD: ICD-10-PCS | Mod: 26,,, | Performed by: RADIOLOGY

## 2020-08-20 PROCEDURE — 77063 BREAST TOMOSYNTHESIS BI: CPT | Mod: 26,,, | Performed by: RADIOLOGY

## 2020-08-20 PROCEDURE — 77067 SCR MAMMO BI INCL CAD: CPT | Mod: TC

## 2020-08-20 PROCEDURE — 77067 MAMMO DIGITAL SCREENING BILAT WITH TOMOSYNTHESIS_CAD: ICD-10-PCS | Mod: 26,,, | Performed by: RADIOLOGY

## 2020-08-20 PROCEDURE — 77067 SCR MAMMO BI INCL CAD: CPT | Mod: 26,,, | Performed by: RADIOLOGY

## 2020-09-01 LAB
FINAL PATHOLOGIC DIAGNOSIS: NORMAL
Lab: NORMAL

## 2020-10-08 ENCOUNTER — OFFICE VISIT (OUTPATIENT)
Dept: INTERNAL MEDICINE | Facility: CLINIC | Age: 70
End: 2020-10-08
Payer: MEDICARE

## 2020-10-08 ENCOUNTER — LAB VISIT (OUTPATIENT)
Dept: LAB | Facility: OTHER | Age: 70
End: 2020-10-08
Attending: INTERNAL MEDICINE
Payer: MEDICARE

## 2020-10-08 VITALS
BODY MASS INDEX: 18.48 KG/M2 | WEIGHT: 108.25 LBS | HEART RATE: 60 BPM | HEIGHT: 64 IN | OXYGEN SATURATION: 100 % | SYSTOLIC BLOOD PRESSURE: 118 MMHG | DIASTOLIC BLOOD PRESSURE: 56 MMHG

## 2020-10-08 DIAGNOSIS — E06.3 HASHIMOTO'S DISEASE: ICD-10-CM

## 2020-10-08 DIAGNOSIS — E78.2 MIXED HYPERLIPIDEMIA: Chronic | ICD-10-CM

## 2020-10-08 DIAGNOSIS — E10.42 DIABETIC PERIPHERAL NEUROPATHY ASSOCIATED WITH TYPE 1 DIABETES MELLITUS: ICD-10-CM

## 2020-10-08 DIAGNOSIS — I47.10 SUPRAVENTRICULAR TACHYCARDIA: ICD-10-CM

## 2020-10-08 DIAGNOSIS — I10 ESSENTIAL HYPERTENSION: Chronic | ICD-10-CM

## 2020-10-08 DIAGNOSIS — E13.9 LATENT AUTOIMMUNE DIABETES IN ADULTS (LADA), MANAGED AS TYPE 1: ICD-10-CM

## 2020-10-08 DIAGNOSIS — E05.90 HYPERTHYROIDISM: ICD-10-CM

## 2020-10-08 DIAGNOSIS — M81.0 OSTEOPOROSIS, POST-MENOPAUSAL: Chronic | ICD-10-CM

## 2020-10-08 DIAGNOSIS — Z86.79 STATUS POST CATHETER ABLATION OF SLOW PATHWAY: ICD-10-CM

## 2020-10-08 DIAGNOSIS — I87.2 VENOUS INSUFFICIENCY (CHRONIC) (PERIPHERAL): Chronic | ICD-10-CM

## 2020-10-08 DIAGNOSIS — E10.42 DIABETIC PERIPHERAL NEUROPATHY ASSOCIATED WITH TYPE 1 DIABETES MELLITUS: Primary | ICD-10-CM

## 2020-10-08 DIAGNOSIS — Z98.890 STATUS POST CATHETER ABLATION OF SLOW PATHWAY: ICD-10-CM

## 2020-10-08 DIAGNOSIS — I70.0 AORTIC ATHEROSCLEROSIS: ICD-10-CM

## 2020-10-08 LAB
ANION GAP SERPL CALC-SCNC: 9 MMOL/L (ref 8–16)
BUN SERPL-MCNC: 20 MG/DL (ref 8–23)
CALCIUM SERPL-MCNC: 9.7 MG/DL (ref 8.7–10.5)
CHLORIDE SERPL-SCNC: 102 MMOL/L (ref 95–110)
CO2 SERPL-SCNC: 29 MMOL/L (ref 23–29)
CREAT SERPL-MCNC: 1.1 MG/DL (ref 0.5–1.4)
EST. GFR  (AFRICAN AMERICAN): 59 ML/MIN/1.73 M^2
EST. GFR  (NON AFRICAN AMERICAN): 51 ML/MIN/1.73 M^2
GLUCOSE SERPL-MCNC: 196 MG/DL (ref 70–110)
POTASSIUM SERPL-SCNC: 4.2 MMOL/L (ref 3.5–5.1)
SODIUM SERPL-SCNC: 140 MMOL/L (ref 136–145)
TSH SERPL DL<=0.005 MIU/L-ACNC: 3.53 UIU/ML (ref 0.4–4)

## 2020-10-08 PROCEDURE — 1101F PT FALLS ASSESS-DOCD LE1/YR: CPT | Mod: CPTII,S$GLB,, | Performed by: INTERNAL MEDICINE

## 2020-10-08 PROCEDURE — 99214 PR OFFICE/OUTPT VISIT, EST, LEVL IV, 30-39 MIN: ICD-10-PCS | Mod: S$GLB,,, | Performed by: INTERNAL MEDICINE

## 2020-10-08 PROCEDURE — 84443 ASSAY THYROID STIM HORMONE: CPT

## 2020-10-08 PROCEDURE — 3074F PR MOST RECENT SYSTOLIC BLOOD PRESSURE < 130 MM HG: ICD-10-PCS | Mod: CPTII,S$GLB,, | Performed by: INTERNAL MEDICINE

## 2020-10-08 PROCEDURE — 1101F PR PT FALLS ASSESS DOC 0-1 FALLS W/OUT INJ PAST YR: ICD-10-PCS | Mod: CPTII,S$GLB,, | Performed by: INTERNAL MEDICINE

## 2020-10-08 PROCEDURE — 3008F PR BODY MASS INDEX (BMI) DOCUMENTED: ICD-10-PCS | Mod: CPTII,S$GLB,, | Performed by: INTERNAL MEDICINE

## 2020-10-08 PROCEDURE — 3078F DIAST BP <80 MM HG: CPT | Mod: CPTII,S$GLB,, | Performed by: INTERNAL MEDICINE

## 2020-10-08 PROCEDURE — 99214 OFFICE O/P EST MOD 30 MIN: CPT | Mod: S$GLB,,, | Performed by: INTERNAL MEDICINE

## 2020-10-08 PROCEDURE — 3008F BODY MASS INDEX DOCD: CPT | Mod: CPTII,S$GLB,, | Performed by: INTERNAL MEDICINE

## 2020-10-08 PROCEDURE — 83520 IMMUNOASSAY QUANT NOS NONAB: CPT

## 2020-10-08 PROCEDURE — 3078F PR MOST RECENT DIASTOLIC BLOOD PRESSURE < 80 MM HG: ICD-10-PCS | Mod: CPTII,S$GLB,, | Performed by: INTERNAL MEDICINE

## 2020-10-08 PROCEDURE — 99499 UNLISTED E&M SERVICE: CPT | Mod: S$GLB,,, | Performed by: INTERNAL MEDICINE

## 2020-10-08 PROCEDURE — 99999 PR PBB SHADOW E&M-EST. PATIENT-LVL IV: ICD-10-PCS | Mod: PBBFAC,,, | Performed by: INTERNAL MEDICINE

## 2020-10-08 PROCEDURE — 83036 HEMOGLOBIN GLYCOSYLATED A1C: CPT

## 2020-10-08 PROCEDURE — 1126F PR PAIN SEVERITY QUANTIFIED, NO PAIN PRESENT: ICD-10-PCS | Mod: S$GLB,,, | Performed by: INTERNAL MEDICINE

## 2020-10-08 PROCEDURE — 99999 PR PBB SHADOW E&M-EST. PATIENT-LVL IV: CPT | Mod: PBBFAC,,, | Performed by: INTERNAL MEDICINE

## 2020-10-08 PROCEDURE — 36415 COLL VENOUS BLD VENIPUNCTURE: CPT

## 2020-10-08 PROCEDURE — 1126F AMNT PAIN NOTED NONE PRSNT: CPT | Mod: S$GLB,,, | Performed by: INTERNAL MEDICINE

## 2020-10-08 PROCEDURE — 3051F PR MOST RECENT HEMOGLOBIN A1C LEVEL 7.0 - < 8.0%: ICD-10-PCS | Mod: CPTII,S$GLB,, | Performed by: INTERNAL MEDICINE

## 2020-10-08 PROCEDURE — 1159F PR MEDICATION LIST DOCUMENTED IN MEDICAL RECORD: ICD-10-PCS | Mod: S$GLB,,, | Performed by: INTERNAL MEDICINE

## 2020-10-08 PROCEDURE — 99499 RISK ADDL DX/OHS AUDIT: ICD-10-PCS | Mod: S$GLB,,, | Performed by: INTERNAL MEDICINE

## 2020-10-08 PROCEDURE — 3051F HG A1C>EQUAL 7.0%<8.0%: CPT | Mod: CPTII,S$GLB,, | Performed by: INTERNAL MEDICINE

## 2020-10-08 PROCEDURE — 1159F MED LIST DOCD IN RCRD: CPT | Mod: S$GLB,,, | Performed by: INTERNAL MEDICINE

## 2020-10-08 PROCEDURE — 3074F SYST BP LT 130 MM HG: CPT | Mod: CPTII,S$GLB,, | Performed by: INTERNAL MEDICINE

## 2020-10-08 PROCEDURE — 80048 BASIC METABOLIC PNL TOTAL CA: CPT

## 2020-10-08 NOTE — PROGRESS NOTES
Subjective:       Patient ID: Sarita Alvarez is a 70 y.o. female.    Chief Complaint: Diabetes and Hypertension    Pt here for f/u. Pt has DM1 and is followed by endocrinology. This is not well controlled as last A1c was 7.7 but is improving. She is on Tresiba 10 units and novolog 3-4 units plus SSI per carb counting. She continues to self-adjust insulin. She had 1 low to 65 last week which she attributes to doing too much physical activity at the time. Her fasting sugars are 110s-170s and prandial sugars are as high as 230. She is still fearful of hypoglycemia so underdoses insulin at times. She has complications of diabetes that includes neuropathy in feet. This is tolerable without meds. Other health maint is up to date.    She also sees endocrinology for hyperthyroidism/hashimotos. She is on tapazole and monitored regularly by endocrinology. She is clinically euthyroid.     She has varicose veins that cause some swelling at times but manages with compression hose. Has seen vascular in past.     Pt's BP is well controlled. Tolerating meds well. Pt denies cp/sob/ha/vision or neuro changes. Checking at home and is well controlled.     HLD is tx with simvastatin which she tolerates well. She has aortic atherosclerosis and is on appropriate risk mitigation tx.    Pt has osteoporosis but most recent DEXA 6/2020 showed osteopenia. She has not tolerated actonel due to CP in past, presumably esophagitis. She is not willing to try another bisphosphonate either oral or IV. We discussed prolia but she declines. She understands r/b of this. She is engaged in resistance exercises.      Pt had symptomatic SVT s/p ablation and has not had recurrence.      Diabetes  Pertinent negatives for hypoglycemia include no dizziness or headaches. Pertinent negatives for diabetes include no chest pain and no polyuria.   Medication Refill  Pertinent negatives include no chest pain or headaches.   Hypertension  Pertinent negatives  include no chest pain, headaches or shortness of breath.     Review of Systems   Constitutional: Negative for unexpected weight change.   Eyes: Negative for visual disturbance.   Respiratory: Negative for shortness of breath.    Cardiovascular: Negative for chest pain.   Gastrointestinal: Negative for blood in stool.   Endocrine: Negative for polyuria.   Genitourinary: Negative for frequency.   Neurological: Negative for dizziness, light-headedness and headaches.   Psychiatric/Behavioral: Negative for dysphoric mood.       Objective:          Assessment:       1. Diabetic peripheral neuropathy associated with type 1 diabetes mellitus    2. Essential hypertension    3. Mixed hyperlipidemia    4. Venous insufficiency (chronic) (peripheral)    5. Supraventricular tachycardia    6. Status post catheter ablation of slow pathway    7. Aortic atherosclerosis    8. 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 Hyperthyroidism    10. Hashimoto's disease    11. Osteoporosis, post-menopausal        Plan:       1. Appropriate labs     2. Keep f/u with specialists including endocrinology  3. Home BP and BS monitoring; reviewed hypoglycemia plan with pt  4. She continues to self-adjust insulin so we discussed importance of f/u with endocrinology          Physical Exam  Constitutional:       Appearance: She is well-developed.   HENT:      Head: Normocephalic and atraumatic.      Right Ear: Hearing, tympanic membrane and ear canal normal.      Left Ear: Hearing, tympanic membrane and ear canal normal.      Mouth/Throat:      Pharynx: No oropharyngeal exudate or posterior oropharyngeal erythema.   Eyes:      Pupils: Pupils are equal, round, and reactive to light.   Neck:      Musculoskeletal: Neck supple.      Thyroid: No thyroid mass or thyromegaly.      Vascular: No carotid bruit.   Cardiovascular:      Rate and Rhythm: Normal rate and regular rhythm.      Pulses:           Radial pulses are 2+ on the right  side and 2+ on the left side.      Heart sounds: Normal heart sounds, S1 normal and S2 normal. No murmur.   Pulmonary:      Effort: Pulmonary effort is normal.      Breath sounds: Normal breath sounds. No wheezing.   Abdominal:      General: Bowel sounds are normal. There is no distension.      Palpations: Abdomen is soft. There is no mass.      Tenderness: There is no abdominal tenderness.   Musculoskeletal:      Right foot: Normal.      Left foot: Normal.   Lymphadenopathy:      Cervical: No cervical adenopathy.   Neurological:      Mental Status: She is alert and oriented to person, place, and time.      Cranial Nerves: No cranial nerve deficit.      Sensory: No sensory deficit.      Coordination: Coordination normal.      Gait: Gait normal.   Psychiatric:         Behavior: Behavior normal.

## 2020-10-09 ENCOUNTER — IMMUNIZATION (OUTPATIENT)
Dept: PHARMACY | Facility: CLINIC | Age: 70
End: 2020-10-09
Payer: MEDICARE

## 2020-10-09 LAB
ESTIMATED AVG GLUCOSE: 192 MG/DL (ref 68–131)
HBA1C MFR BLD HPLC: 8.3 % (ref 4–5.6)

## 2020-10-12 ENCOUNTER — PATIENT OUTREACH (OUTPATIENT)
Dept: ADMINISTRATIVE | Facility: OTHER | Age: 70
End: 2020-10-12

## 2020-10-13 ENCOUNTER — OFFICE VISIT (OUTPATIENT)
Dept: ENDOCRINOLOGY | Facility: CLINIC | Age: 70
End: 2020-10-13
Payer: MEDICARE

## 2020-10-13 VITALS
SYSTOLIC BLOOD PRESSURE: 110 MMHG | DIASTOLIC BLOOD PRESSURE: 70 MMHG | WEIGHT: 109 LBS | BODY MASS INDEX: 18.61 KG/M2 | HEIGHT: 64 IN

## 2020-10-13 DIAGNOSIS — E13.9 LATENT AUTOIMMUNE DIABETES IN ADULTS (LADA), MANAGED AS TYPE 1: ICD-10-CM

## 2020-10-13 DIAGNOSIS — E78.2 MIXED HYPERLIPIDEMIA: Chronic | ICD-10-CM

## 2020-10-13 DIAGNOSIS — E10.42 DIABETIC PERIPHERAL NEUROPATHY ASSOCIATED WITH TYPE 1 DIABETES MELLITUS: ICD-10-CM

## 2020-10-13 DIAGNOSIS — E05.90 HYPERTHYROIDISM: Primary | ICD-10-CM

## 2020-10-13 LAB — TSH RECEP AB SER-ACNC: <1 IU/L (ref 0–1.75)

## 2020-10-13 PROCEDURE — 3008F BODY MASS INDEX DOCD: CPT | Mod: CPTII,S$GLB,, | Performed by: INTERNAL MEDICINE

## 2020-10-13 PROCEDURE — 1126F AMNT PAIN NOTED NONE PRSNT: CPT | Mod: S$GLB,,, | Performed by: INTERNAL MEDICINE

## 2020-10-13 PROCEDURE — 1126F PR PAIN SEVERITY QUANTIFIED, NO PAIN PRESENT: ICD-10-PCS | Mod: S$GLB,,, | Performed by: INTERNAL MEDICINE

## 2020-10-13 PROCEDURE — 1101F PR PT FALLS ASSESS DOC 0-1 FALLS W/OUT INJ PAST YR: ICD-10-PCS | Mod: CPTII,S$GLB,, | Performed by: INTERNAL MEDICINE

## 2020-10-13 PROCEDURE — 99214 OFFICE O/P EST MOD 30 MIN: CPT | Mod: S$GLB,,, | Performed by: INTERNAL MEDICINE

## 2020-10-13 PROCEDURE — 1159F MED LIST DOCD IN RCRD: CPT | Mod: S$GLB,,, | Performed by: INTERNAL MEDICINE

## 2020-10-13 PROCEDURE — 99999 PR PBB SHADOW E&M-EST. PATIENT-LVL IV: CPT | Mod: PBBFAC,,, | Performed by: INTERNAL MEDICINE

## 2020-10-13 PROCEDURE — 1101F PT FALLS ASSESS-DOCD LE1/YR: CPT | Mod: CPTII,S$GLB,, | Performed by: INTERNAL MEDICINE

## 2020-10-13 PROCEDURE — 3074F PR MOST RECENT SYSTOLIC BLOOD PRESSURE < 130 MM HG: ICD-10-PCS | Mod: CPTII,S$GLB,, | Performed by: INTERNAL MEDICINE

## 2020-10-13 PROCEDURE — 3078F PR MOST RECENT DIASTOLIC BLOOD PRESSURE < 80 MM HG: ICD-10-PCS | Mod: CPTII,S$GLB,, | Performed by: INTERNAL MEDICINE

## 2020-10-13 PROCEDURE — 3074F SYST BP LT 130 MM HG: CPT | Mod: CPTII,S$GLB,, | Performed by: INTERNAL MEDICINE

## 2020-10-13 PROCEDURE — 99999 PR PBB SHADOW E&M-EST. PATIENT-LVL IV: ICD-10-PCS | Mod: PBBFAC,,, | Performed by: INTERNAL MEDICINE

## 2020-10-13 PROCEDURE — 99214 PR OFFICE/OUTPT VISIT, EST, LEVL IV, 30-39 MIN: ICD-10-PCS | Mod: S$GLB,,, | Performed by: INTERNAL MEDICINE

## 2020-10-13 PROCEDURE — 3052F HG A1C>EQUAL 8.0%<EQUAL 9.0%: CPT | Mod: CPTII,S$GLB,, | Performed by: INTERNAL MEDICINE

## 2020-10-13 PROCEDURE — 1159F PR MEDICATION LIST DOCUMENTED IN MEDICAL RECORD: ICD-10-PCS | Mod: S$GLB,,, | Performed by: INTERNAL MEDICINE

## 2020-10-13 PROCEDURE — 3052F PR MOST RECENT HEMOGLOBIN A1C LEVEL 8.0 - < 9.0%: ICD-10-PCS | Mod: CPTII,S$GLB,, | Performed by: INTERNAL MEDICINE

## 2020-10-13 PROCEDURE — 3078F DIAST BP <80 MM HG: CPT | Mod: CPTII,S$GLB,, | Performed by: INTERNAL MEDICINE

## 2020-10-13 PROCEDURE — 3008F PR BODY MASS INDEX (BMI) DOCUMENTED: ICD-10-PCS | Mod: CPTII,S$GLB,, | Performed by: INTERNAL MEDICINE

## 2020-10-13 NOTE — ASSESSMENT & PLAN NOTE
--Patient with hyperthyroidism secondary to Grave's disease  --TRAb is now detectable and TSH remains normal  --Will continue methimazole 5 mg once daily for now  --Will likely decreased methimazole dose to 2.5 mg once daily at next visit

## 2020-10-13 NOTE — ASSESSMENT & PLAN NOTE
--Patient with NILAY treated as type 1 diabetes  --A1c goal <7.5%  --Recent A1c has increased  --She often does not give her full dose of prandial insulin due to fear of hypoglycemia and I suspect this is why her A1c remains above goal  --She would greatly benefit from a CGM and I have recommended the Dexcom G6 (she would like to think about it and will let me know once she makes a decision)  --Will continue Tresiba 10 units daily  --Will continue Humalog TID AC at 1:13 ICR with low dose correction scale (self-adjusting insulin)  --I was able to change the date and time in her meter to the correct ones today  --UTD with foot exam and eye exam  --has glucagon emergency kit

## 2020-10-13 NOTE — ASSESSMENT & PLAN NOTE
--up to date with eye exam  --No history of foot ulcer  --Better long term blood glucose control to prevent progression

## 2020-10-13 NOTE — PROGRESS NOTES
Subjective:      Patient ID: Sarita Alvarez is a 70 y.o. female.    Chief Complaint:  Hyperthyroidism      History of Present Illness  Ms. Alvarez presents for follow up of NILAY treated as type 1 and hyperthyroidism secondary to Grave's disease. Last visit 6/2020.     Diagnosed with gestational DM at age 42. In 1997, she was diagnosed with T2DM, however workup revealed NILAY.     , has children, grandchildren, retired.     In regards to diabetes:    No hospitalizations or illnesses since last visit with me.      Checks BG 6-8 times per day (fasting, pre-meal and 1-2 hrs post-meal)  Reviewed meter, but time and date not correct (I was able to correct the time in her meter today):  132-278     Has had hypoglycemia in the 60's 1-2 times per week. Happens during the day while more active. Able to recognize and treat. No hypoglycemia overnight.     Eats breakfast, lunch, and dinner. Counts carbs for meals/snacks.      Doesn't miss any insulin doses.      No interest in insulin pumps.      CURRENT DIABETIC MEDS: Tresiba 10 units QAM, Novolog 2-4 units based 1:13 I:C ratio, goal 100, ISF 50  Very sensitive to insulin  Since she is very sensitive to insulin she will not always give the full dose of prandial insulin for fear of hypoglycemia     Uses Vials or Pens: Pens  Type of Glucose Meter: Relion Meter     Last Eye Exam: 7/2020, no retinopathy  Last Podiatry Exam: 6/2020  DM Education: May 2017       In regards to hyperthyroidism:  Diagnosed with Grave's disease in 1/2013.      Initial labs at diagnosis:  Results for SARITA ALVAREZ (MRN [id number]) as of 6/16/2020 09:06    Ref. Range 1/7/2013 09:16 2/5/2013 16:20   TSH Latest Ref Range: 0.4 - 4.0 uIU/ml <0.010 (L)     Free T4 Latest Ref Range: 0.71 - 1.51 ng/dl 2.43 (H)     Thyroperoxidase Antibodies Latest Ref Range: 0 - 6 IU/mL   740.7 (H)   Thyroid-Stim IG Quantitative Latest Ref Range: <140 %   373 (H)      Currently on methimazole 5 mg once daily.    Denies palpitations or tremor.     Results for LICHA CHENEY (MRN [id number]) as of 10/13/2020 16:31   Ref. Range 10/8/2020 10:27   TSH Latest Ref Range: 0.400 - 4.000 uIU/mL 3.534   Thyrotropin Receptor Ab Latest Ref Range: 0.00 - 1.75 IU/L <1.00       Review of Systems   Constitutional: Negative for chills and fever.   Gastrointestinal: Negative for nausea.       Objective:   Physical Exam  Vitals signs and nursing note reviewed.         BP Readings from Last 3 Encounters:   10/13/20 110/70   10/08/20 (!) 118/56   08/18/20 100/64     Wt Readings from Last 1 Encounters:   10/13/20 0958 49.5 kg (109 lb 0.3 oz)       Body mass index is 18.71 kg/m².    Lab Review:   Lab Results   Component Value Date    HGBA1C 8.3 (H) 10/08/2020     Lab Results   Component Value Date    CHOL 170 01/24/2020    HDL 78 (H) 01/24/2020    LDLCALC 83.4 01/24/2020    TRIG 43 01/24/2020    CHOLHDL 45.9 01/24/2020     Lab Results   Component Value Date     10/08/2020    K 4.2 10/08/2020     10/08/2020    CO2 29 10/08/2020     (H) 10/08/2020    BUN 20 10/08/2020    CREATININE 1.1 10/08/2020    CALCIUM 9.7 10/08/2020    PROT 7.0 01/24/2020    ALBUMIN 3.8 01/24/2020    BILITOT 0.4 01/24/2020    ALKPHOS 76 01/24/2020    AST 15 01/24/2020    ALT 16 01/24/2020    ANIONGAP 9 10/08/2020    ESTGFRAFRICA 59 (A) 10/08/2020    EGFRNONAA 51 (A) 10/08/2020    TSH 3.534 10/08/2020         Assessment and Plan     Hyperthyroidism  --Patient with hyperthyroidism secondary to Grave's disease  --TRAb is now detectable and TSH remains normal  --Will continue methimazole 5 mg once daily for now  --Will likely decreased methimazole dose to 2.5 mg once daily at next visit    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), managed as type 1  --Patient with NILAY treated as type 1 diabetes  --A1c goal <7.5%  --Recent A1c has increased  --She often does not give her full dose of prandial insulin due to fear of hypoglycemia and I suspect this is why  her A1c remains above goal  --She would greatly benefit from a CGM and I have recommended the Dexcom G6 (she would like to think about it and will let me know once she makes a decision)  --Will continue Tresiba 10 units daily  --Will continue Humalog TID AC at 1:13 ICR with low dose correction scale (self-adjusting insulin)  --I was able to change the date and time in her meter to the correct ones today  --UTD with foot exam and eye exam  --has glucagon emergency kit    Diabetic peripheral neuropathy associated with type 1 diabetes mellitus  --up to date with eye exam  --No history of foot ulcer  --Better long term blood glucose control to prevent progression    Mixed hyperlipidemia  --On statin per ADA recs      Follow up in 3-4 months with fasting labs prior to appt      Ronald Koch M.D. Staff Endocrinology

## 2021-01-08 ENCOUNTER — TELEPHONE (OUTPATIENT)
Dept: INTERNAL MEDICINE | Facility: CLINIC | Age: 71
End: 2021-01-08

## 2021-01-11 DIAGNOSIS — E10.9 TYPE 1 DIABETES MELLITUS NOT AT GOAL: ICD-10-CM

## 2021-01-11 RX ORDER — INSULIN DEGLUDEC 100 U/ML
INJECTION, SOLUTION SUBCUTANEOUS
Qty: 6 ML | Refills: 3 | Status: SHIPPED | OUTPATIENT
Start: 2021-01-11 | End: 2021-04-28 | Stop reason: SDUPTHER

## 2021-01-19 ENCOUNTER — LAB VISIT (OUTPATIENT)
Dept: LAB | Facility: OTHER | Age: 71
End: 2021-01-19
Attending: INTERNAL MEDICINE
Payer: MEDICARE

## 2021-01-19 DIAGNOSIS — E13.9 LATENT AUTOIMMUNE DIABETES IN ADULTS (LADA), MANAGED AS TYPE 1: ICD-10-CM

## 2021-01-19 DIAGNOSIS — E05.90 HYPERTHYROIDISM: ICD-10-CM

## 2021-01-19 LAB
ANION GAP SERPL CALC-SCNC: 9 MMOL/L (ref 8–16)
BUN SERPL-MCNC: 24 MG/DL (ref 8–23)
CALCIUM SERPL-MCNC: 9.5 MG/DL (ref 8.7–10.5)
CHLORIDE SERPL-SCNC: 103 MMOL/L (ref 95–110)
CHOLEST SERPL-MCNC: 160 MG/DL (ref 120–199)
CHOLEST/HDLC SERPL: 2.3 {RATIO} (ref 2–5)
CO2 SERPL-SCNC: 28 MMOL/L (ref 23–29)
CREAT SERPL-MCNC: 0.9 MG/DL (ref 0.5–1.4)
EST. GFR  (AFRICAN AMERICAN): >60 ML/MIN/1.73 M^2
EST. GFR  (NON AFRICAN AMERICAN): >60 ML/MIN/1.73 M^2
ESTIMATED AVG GLUCOSE: 197 MG/DL (ref 68–131)
GLUCOSE SERPL-MCNC: 126 MG/DL (ref 70–110)
HBA1C MFR BLD HPLC: 8.5 % (ref 4–5.6)
HDLC SERPL-MCNC: 71 MG/DL (ref 40–75)
HDLC SERPL: 44.4 % (ref 20–50)
LDLC SERPL CALC-MCNC: 81.4 MG/DL (ref 63–159)
NONHDLC SERPL-MCNC: 89 MG/DL
POTASSIUM SERPL-SCNC: 4.1 MMOL/L (ref 3.5–5.1)
SODIUM SERPL-SCNC: 140 MMOL/L (ref 136–145)
TRIGL SERPL-MCNC: 38 MG/DL (ref 30–150)
TSH SERPL DL<=0.005 MIU/L-ACNC: 3.93 UIU/ML (ref 0.4–4)

## 2021-01-19 PROCEDURE — 80061 LIPID PANEL: CPT

## 2021-01-19 PROCEDURE — 80048 BASIC METABOLIC PNL TOTAL CA: CPT

## 2021-01-19 PROCEDURE — 36415 COLL VENOUS BLD VENIPUNCTURE: CPT

## 2021-01-19 PROCEDURE — 84443 ASSAY THYROID STIM HORMONE: CPT

## 2021-01-19 PROCEDURE — 83036 HEMOGLOBIN GLYCOSYLATED A1C: CPT

## 2021-01-25 ENCOUNTER — PATIENT MESSAGE (OUTPATIENT)
Dept: ENDOCRINOLOGY | Facility: CLINIC | Age: 71
End: 2021-01-25

## 2021-01-25 ENCOUNTER — PATIENT OUTREACH (OUTPATIENT)
Dept: ADMINISTRATIVE | Facility: OTHER | Age: 71
End: 2021-01-25

## 2021-01-29 DIAGNOSIS — I10 HYPERTENSION, UNSPECIFIED TYPE: ICD-10-CM

## 2021-01-29 RX ORDER — ENALAPRIL MALEATE 20 MG/1
TABLET ORAL
Qty: 90 TABLET | Refills: 0 | Status: SHIPPED | OUTPATIENT
Start: 2021-01-29 | End: 2021-04-27

## 2021-05-19 ENCOUNTER — PATIENT OUTREACH (OUTPATIENT)
Dept: ADMINISTRATIVE | Facility: OTHER | Age: 71
End: 2021-05-19

## 2021-05-24 ENCOUNTER — OFFICE VISIT (OUTPATIENT)
Dept: CARDIOLOGY | Facility: CLINIC | Age: 71
End: 2021-05-24
Payer: MEDICARE

## 2021-05-24 VITALS
DIASTOLIC BLOOD PRESSURE: 68 MMHG | HEART RATE: 53 BPM | WEIGHT: 111.75 LBS | BODY MASS INDEX: 19.08 KG/M2 | SYSTOLIC BLOOD PRESSURE: 155 MMHG | HEIGHT: 64 IN

## 2021-05-24 DIAGNOSIS — E11.9 TYPE 2 DIABETES MELLITUS WITHOUT COMPLICATION, WITHOUT LONG-TERM CURRENT USE OF INSULIN: ICD-10-CM

## 2021-05-24 DIAGNOSIS — I73.9 PAD (PERIPHERAL ARTERY DISEASE): ICD-10-CM

## 2021-05-24 DIAGNOSIS — E78.2 MIXED HYPERLIPIDEMIA: Primary | ICD-10-CM

## 2021-05-24 DIAGNOSIS — I10 ESSENTIAL HYPERTENSION: ICD-10-CM

## 2021-05-24 PROCEDURE — 3052F HG A1C>EQUAL 8.0%<EQUAL 9.0%: CPT | Mod: CPTII,S$GLB,, | Performed by: INTERNAL MEDICINE

## 2021-05-24 PROCEDURE — 99213 OFFICE O/P EST LOW 20 MIN: CPT | Mod: S$GLB,,, | Performed by: INTERNAL MEDICINE

## 2021-05-24 PROCEDURE — 1159F PR MEDICATION LIST DOCUMENTED IN MEDICAL RECORD: ICD-10-PCS | Mod: S$GLB,,, | Performed by: INTERNAL MEDICINE

## 2021-05-24 PROCEDURE — 99999 PR PBB SHADOW E&M-EST. PATIENT-LVL V: ICD-10-PCS | Mod: PBBFAC,,, | Performed by: INTERNAL MEDICINE

## 2021-05-24 PROCEDURE — 3052F PR MOST RECENT HEMOGLOBIN A1C LEVEL 8.0 - < 9.0%: ICD-10-PCS | Mod: CPTII,S$GLB,, | Performed by: INTERNAL MEDICINE

## 2021-05-24 PROCEDURE — 99499 UNLISTED E&M SERVICE: CPT | Mod: S$GLB,,, | Performed by: INTERNAL MEDICINE

## 2021-05-24 PROCEDURE — 3008F BODY MASS INDEX DOCD: CPT | Mod: CPTII,S$GLB,, | Performed by: INTERNAL MEDICINE

## 2021-05-24 PROCEDURE — 1159F MED LIST DOCD IN RCRD: CPT | Mod: S$GLB,,, | Performed by: INTERNAL MEDICINE

## 2021-05-24 PROCEDURE — 99213 PR OFFICE/OUTPT VISIT, EST, LEVL III, 20-29 MIN: ICD-10-PCS | Mod: S$GLB,,, | Performed by: INTERNAL MEDICINE

## 2021-05-24 PROCEDURE — 1126F AMNT PAIN NOTED NONE PRSNT: CPT | Mod: S$GLB,,, | Performed by: INTERNAL MEDICINE

## 2021-05-24 PROCEDURE — 99499 RISK ADDL DX/OHS AUDIT: ICD-10-PCS | Mod: S$GLB,,, | Performed by: INTERNAL MEDICINE

## 2021-05-24 PROCEDURE — 1126F PR PAIN SEVERITY QUANTIFIED, NO PAIN PRESENT: ICD-10-PCS | Mod: S$GLB,,, | Performed by: INTERNAL MEDICINE

## 2021-05-24 PROCEDURE — 3072F PR LOW RISK FOR RETINOPATHY: ICD-10-PCS | Mod: S$GLB,,, | Performed by: INTERNAL MEDICINE

## 2021-05-24 PROCEDURE — 3008F PR BODY MASS INDEX (BMI) DOCUMENTED: ICD-10-PCS | Mod: CPTII,S$GLB,, | Performed by: INTERNAL MEDICINE

## 2021-05-24 PROCEDURE — 3072F LOW RISK FOR RETINOPATHY: CPT | Mod: S$GLB,,, | Performed by: INTERNAL MEDICINE

## 2021-05-24 PROCEDURE — 99999 PR PBB SHADOW E&M-EST. PATIENT-LVL V: CPT | Mod: PBBFAC,,, | Performed by: INTERNAL MEDICINE

## 2021-07-02 ENCOUNTER — PATIENT MESSAGE (OUTPATIENT)
Dept: ADMINISTRATIVE | Facility: HOSPITAL | Age: 71
End: 2021-07-02

## 2021-07-02 ENCOUNTER — PATIENT OUTREACH (OUTPATIENT)
Dept: ADMINISTRATIVE | Facility: HOSPITAL | Age: 71
End: 2021-07-02

## 2021-07-13 ENCOUNTER — PATIENT OUTREACH (OUTPATIENT)
Dept: ADMINISTRATIVE | Facility: HOSPITAL | Age: 71
End: 2021-07-13

## 2021-07-13 DIAGNOSIS — Z12.31 ENCOUNTER FOR SCREENING MAMMOGRAM FOR BREAST CANCER: ICD-10-CM

## 2021-07-13 DIAGNOSIS — E11.9 DIABETES MELLITUS WITHOUT COMPLICATION: Primary | ICD-10-CM

## 2021-07-29 ENCOUNTER — LAB VISIT (OUTPATIENT)
Dept: LAB | Facility: OTHER | Age: 71
End: 2021-07-29
Attending: INTERNAL MEDICINE
Payer: MEDICARE

## 2021-07-29 DIAGNOSIS — I10 ESSENTIAL HYPERTENSION: ICD-10-CM

## 2021-07-29 DIAGNOSIS — E11.9 TYPE 2 DIABETES MELLITUS WITHOUT COMPLICATION, WITHOUT LONG-TERM CURRENT USE OF INSULIN: ICD-10-CM

## 2021-07-29 LAB
ALBUMIN SERPL BCP-MCNC: 3.7 G/DL (ref 3.5–5.2)
ALP SERPL-CCNC: 68 U/L (ref 55–135)
ALT SERPL W/O P-5'-P-CCNC: 18 U/L (ref 10–44)
ANION GAP SERPL CALC-SCNC: 9 MMOL/L (ref 8–16)
AST SERPL-CCNC: 20 U/L (ref 10–40)
BASOPHILS # BLD AUTO: 0.04 K/UL (ref 0–0.2)
BASOPHILS NFR BLD: 0.6 % (ref 0–1.9)
BILIRUB SERPL-MCNC: 0.5 MG/DL (ref 0.1–1)
BUN SERPL-MCNC: 16 MG/DL (ref 8–23)
CALCIUM SERPL-MCNC: 9.5 MG/DL (ref 8.7–10.5)
CHLORIDE SERPL-SCNC: 101 MMOL/L (ref 95–110)
CO2 SERPL-SCNC: 26 MMOL/L (ref 23–29)
CREAT SERPL-MCNC: 0.8 MG/DL (ref 0.5–1.4)
DIFFERENTIAL METHOD: ABNORMAL
EOSINOPHIL # BLD AUTO: 0.1 K/UL (ref 0–0.5)
EOSINOPHIL NFR BLD: 1.6 % (ref 0–8)
ERYTHROCYTE [DISTWIDTH] IN BLOOD BY AUTOMATED COUNT: 12.1 % (ref 11.5–14.5)
EST. GFR  (AFRICAN AMERICAN): >60 ML/MIN/1.73 M^2
EST. GFR  (NON AFRICAN AMERICAN): >60 ML/MIN/1.73 M^2
GLUCOSE SERPL-MCNC: 111 MG/DL (ref 70–110)
HCT VFR BLD AUTO: 41.9 % (ref 37–48.5)
HGB BLD-MCNC: 13.2 G/DL (ref 12–16)
IMM GRANULOCYTES # BLD AUTO: 0.01 K/UL (ref 0–0.04)
IMM GRANULOCYTES NFR BLD AUTO: 0.1 % (ref 0–0.5)
LYMPHOCYTES # BLD AUTO: 1.3 K/UL (ref 1–4.8)
LYMPHOCYTES NFR BLD: 20.1 % (ref 18–48)
MCH RBC QN AUTO: 27.2 PG (ref 27–31)
MCHC RBC AUTO-ENTMCNC: 31.5 G/DL (ref 32–36)
MCV RBC AUTO: 86 FL (ref 82–98)
MONOCYTES # BLD AUTO: 0.6 K/UL (ref 0.3–1)
MONOCYTES NFR BLD: 8.2 % (ref 4–15)
NEUTROPHILS # BLD AUTO: 4.6 K/UL (ref 1.8–7.7)
NEUTROPHILS NFR BLD: 69.4 % (ref 38–73)
NRBC BLD-RTO: 0 /100 WBC
PLATELET # BLD AUTO: 214 K/UL (ref 150–450)
PMV BLD AUTO: 10.4 FL (ref 9.2–12.9)
POTASSIUM SERPL-SCNC: 3.8 MMOL/L (ref 3.5–5.1)
PROT SERPL-MCNC: 7 G/DL (ref 6–8.4)
RBC # BLD AUTO: 4.86 M/UL (ref 4–5.4)
SODIUM SERPL-SCNC: 136 MMOL/L (ref 136–145)
WBC # BLD AUTO: 6.67 K/UL (ref 3.9–12.7)

## 2021-07-29 PROCEDURE — 80053 COMPREHEN METABOLIC PANEL: CPT | Performed by: INTERNAL MEDICINE

## 2021-07-29 PROCEDURE — 83036 HEMOGLOBIN GLYCOSYLATED A1C: CPT | Performed by: INTERNAL MEDICINE

## 2021-07-29 PROCEDURE — 85025 COMPLETE CBC W/AUTO DIFF WBC: CPT | Performed by: INTERNAL MEDICINE

## 2021-07-29 PROCEDURE — 36415 COLL VENOUS BLD VENIPUNCTURE: CPT | Performed by: INTERNAL MEDICINE

## 2021-07-30 LAB
ESTIMATED AVG GLUCOSE: 183 MG/DL (ref 68–131)
HBA1C MFR BLD: 8 % (ref 4–5.6)

## 2021-08-05 ENCOUNTER — OFFICE VISIT (OUTPATIENT)
Dept: INTERNAL MEDICINE | Facility: CLINIC | Age: 71
End: 2021-08-05
Payer: MEDICARE

## 2021-08-05 VITALS
SYSTOLIC BLOOD PRESSURE: 136 MMHG | OXYGEN SATURATION: 99 % | BODY MASS INDEX: 19.39 KG/M2 | WEIGHT: 113.56 LBS | HEART RATE: 58 BPM | DIASTOLIC BLOOD PRESSURE: 58 MMHG | HEIGHT: 64 IN

## 2021-08-05 DIAGNOSIS — I70.0 AORTIC ATHEROSCLEROSIS: ICD-10-CM

## 2021-08-05 DIAGNOSIS — M81.0 OSTEOPOROSIS, POST-MENOPAUSAL: ICD-10-CM

## 2021-08-05 DIAGNOSIS — E13.9 LATENT AUTOIMMUNE DIABETES IN ADULTS (LADA), MANAGED AS TYPE 1: Primary | ICD-10-CM

## 2021-08-05 DIAGNOSIS — E06.3 HASHIMOTO'S DISEASE: ICD-10-CM

## 2021-08-05 DIAGNOSIS — I87.2 VENOUS INSUFFICIENCY (CHRONIC) (PERIPHERAL): ICD-10-CM

## 2021-08-05 DIAGNOSIS — E10.9 TYPE 1 DIABETES MELLITUS NOT AT GOAL: ICD-10-CM

## 2021-08-05 DIAGNOSIS — I47.10 SVT (SUPRAVENTRICULAR TACHYCARDIA): ICD-10-CM

## 2021-08-05 DIAGNOSIS — I10 ESSENTIAL HYPERTENSION: ICD-10-CM

## 2021-08-05 DIAGNOSIS — E10.42 DIABETIC PERIPHERAL NEUROPATHY ASSOCIATED WITH TYPE 1 DIABETES MELLITUS: ICD-10-CM

## 2021-08-05 DIAGNOSIS — E05.90 HYPERTHYROIDISM: ICD-10-CM

## 2021-08-05 DIAGNOSIS — I10 HYPERTENSION, UNSPECIFIED TYPE: ICD-10-CM

## 2021-08-05 DIAGNOSIS — I47.10 SUPRAVENTRICULAR TACHYCARDIA: ICD-10-CM

## 2021-08-05 DIAGNOSIS — E78.2 MIXED HYPERLIPIDEMIA: ICD-10-CM

## 2021-08-05 DIAGNOSIS — E78.5 HYPERLIPIDEMIA, UNSPECIFIED HYPERLIPIDEMIA TYPE: ICD-10-CM

## 2021-08-05 PROCEDURE — 1101F PT FALLS ASSESS-DOCD LE1/YR: CPT | Mod: CPTII,S$GLB,, | Performed by: INTERNAL MEDICINE

## 2021-08-05 PROCEDURE — 3008F BODY MASS INDEX DOCD: CPT | Mod: CPTII,S$GLB,, | Performed by: INTERNAL MEDICINE

## 2021-08-05 PROCEDURE — 3078F DIAST BP <80 MM HG: CPT | Mod: CPTII,S$GLB,, | Performed by: INTERNAL MEDICINE

## 2021-08-05 PROCEDURE — 99499 RISK ADDL DX/OHS AUDIT: ICD-10-PCS | Mod: S$GLB,,, | Performed by: INTERNAL MEDICINE

## 2021-08-05 PROCEDURE — 99214 OFFICE O/P EST MOD 30 MIN: CPT | Mod: S$GLB,,, | Performed by: INTERNAL MEDICINE

## 2021-08-05 PROCEDURE — 1160F RVW MEDS BY RX/DR IN RCRD: CPT | Mod: CPTII,S$GLB,, | Performed by: INTERNAL MEDICINE

## 2021-08-05 PROCEDURE — 1159F MED LIST DOCD IN RCRD: CPT | Mod: CPTII,S$GLB,, | Performed by: INTERNAL MEDICINE

## 2021-08-05 PROCEDURE — 3288F FALL RISK ASSESSMENT DOCD: CPT | Mod: CPTII,S$GLB,, | Performed by: INTERNAL MEDICINE

## 2021-08-05 PROCEDURE — 99499 UNLISTED E&M SERVICE: CPT | Mod: S$GLB,,, | Performed by: INTERNAL MEDICINE

## 2021-08-05 PROCEDURE — 99999 PR PBB SHADOW E&M-EST. PATIENT-LVL III: ICD-10-PCS | Mod: PBBFAC,,, | Performed by: INTERNAL MEDICINE

## 2021-08-05 PROCEDURE — 3008F PR BODY MASS INDEX (BMI) DOCUMENTED: ICD-10-PCS | Mod: CPTII,S$GLB,, | Performed by: INTERNAL MEDICINE

## 2021-08-05 PROCEDURE — 3075F SYST BP GE 130 - 139MM HG: CPT | Mod: CPTII,S$GLB,, | Performed by: INTERNAL MEDICINE

## 2021-08-05 PROCEDURE — 3052F PR MOST RECENT HEMOGLOBIN A1C LEVEL 8.0 - < 9.0%: ICD-10-PCS | Mod: CPTII,S$GLB,, | Performed by: INTERNAL MEDICINE

## 2021-08-05 PROCEDURE — 1159F PR MEDICATION LIST DOCUMENTED IN MEDICAL RECORD: ICD-10-PCS | Mod: CPTII,S$GLB,, | Performed by: INTERNAL MEDICINE

## 2021-08-05 PROCEDURE — 3072F PR LOW RISK FOR RETINOPATHY: ICD-10-PCS | Mod: CPTII,S$GLB,, | Performed by: INTERNAL MEDICINE

## 2021-08-05 PROCEDURE — 1126F PR PAIN SEVERITY QUANTIFIED, NO PAIN PRESENT: ICD-10-PCS | Mod: CPTII,S$GLB,, | Performed by: INTERNAL MEDICINE

## 2021-08-05 PROCEDURE — 1126F AMNT PAIN NOTED NONE PRSNT: CPT | Mod: CPTII,S$GLB,, | Performed by: INTERNAL MEDICINE

## 2021-08-05 PROCEDURE — 3075F PR MOST RECENT SYSTOLIC BLOOD PRESS GE 130-139MM HG: ICD-10-PCS | Mod: CPTII,S$GLB,, | Performed by: INTERNAL MEDICINE

## 2021-08-05 PROCEDURE — 3288F PR FALLS RISK ASSESSMENT DOCUMENTED: ICD-10-PCS | Mod: CPTII,S$GLB,, | Performed by: INTERNAL MEDICINE

## 2021-08-05 PROCEDURE — 99214 PR OFFICE/OUTPT VISIT, EST, LEVL IV, 30-39 MIN: ICD-10-PCS | Mod: S$GLB,,, | Performed by: INTERNAL MEDICINE

## 2021-08-05 PROCEDURE — 1160F PR REVIEW ALL MEDS BY PRESCRIBER/CLIN PHARMACIST DOCUMENTED: ICD-10-PCS | Mod: CPTII,S$GLB,, | Performed by: INTERNAL MEDICINE

## 2021-08-05 PROCEDURE — 1101F PR PT FALLS ASSESS DOC 0-1 FALLS W/OUT INJ PAST YR: ICD-10-PCS | Mod: CPTII,S$GLB,, | Performed by: INTERNAL MEDICINE

## 2021-08-05 PROCEDURE — 3052F HG A1C>EQUAL 8.0%<EQUAL 9.0%: CPT | Mod: CPTII,S$GLB,, | Performed by: INTERNAL MEDICINE

## 2021-08-05 PROCEDURE — 3072F LOW RISK FOR RETINOPATHY: CPT | Mod: CPTII,S$GLB,, | Performed by: INTERNAL MEDICINE

## 2021-08-05 PROCEDURE — 99999 PR PBB SHADOW E&M-EST. PATIENT-LVL III: CPT | Mod: PBBFAC,,, | Performed by: INTERNAL MEDICINE

## 2021-08-05 PROCEDURE — 3078F PR MOST RECENT DIASTOLIC BLOOD PRESSURE < 80 MM HG: ICD-10-PCS | Mod: CPTII,S$GLB,, | Performed by: INTERNAL MEDICINE

## 2021-08-05 RX ORDER — AMLODIPINE BESYLATE 5 MG/1
5 TABLET ORAL DAILY
Qty: 90 TABLET | Refills: 3 | Status: SHIPPED | OUTPATIENT
Start: 2021-08-05 | End: 2021-09-19 | Stop reason: SDUPTHER

## 2021-08-05 RX ORDER — PANTOPRAZOLE SODIUM 40 MG/1
40 TABLET, DELAYED RELEASE ORAL DAILY
Qty: 90 TABLET | Refills: 1 | Status: SHIPPED | OUTPATIENT
Start: 2021-08-05

## 2021-08-05 RX ORDER — INSULIN DEGLUDEC 100 U/ML
INJECTION, SOLUTION SUBCUTANEOUS
Qty: 2 PEN | Refills: 2 | Status: SHIPPED | OUTPATIENT
Start: 2021-08-05 | End: 2022-01-20 | Stop reason: SDUPTHER

## 2021-08-05 RX ORDER — HYDROCHLOROTHIAZIDE 25 MG/1
25 TABLET ORAL DAILY
Qty: 90 TABLET | Refills: 3 | Status: SHIPPED | OUTPATIENT
Start: 2021-08-05 | End: 2021-10-13

## 2021-08-05 RX ORDER — METOPROLOL SUCCINATE 25 MG/1
25 TABLET, EXTENDED RELEASE ORAL DAILY
Qty: 90 TABLET | Refills: 3 | Status: SHIPPED | OUTPATIENT
Start: 2021-08-05 | End: 2021-10-18

## 2021-08-05 RX ORDER — INSULIN LISPRO 100 [IU]/ML
INJECTION, SOLUTION INTRAVENOUS; SUBCUTANEOUS
Qty: 6 ML | Refills: 2 | Status: SHIPPED | OUTPATIENT
Start: 2021-08-05 | End: 2022-01-20 | Stop reason: SDUPTHER

## 2021-08-05 RX ORDER — ENALAPRIL MALEATE 20 MG/1
20 TABLET ORAL DAILY
Qty: 90 TABLET | Refills: 3 | Status: SHIPPED | OUTPATIENT
Start: 2021-08-05 | End: 2022-10-19

## 2021-08-05 RX ORDER — SIMVASTATIN 20 MG/1
20 TABLET, FILM COATED ORAL DAILY
Qty: 90 TABLET | Refills: 3 | Status: SHIPPED | OUTPATIENT
Start: 2021-08-05 | End: 2022-05-30 | Stop reason: SDUPTHER

## 2021-08-25 ENCOUNTER — HOSPITAL ENCOUNTER (OUTPATIENT)
Dept: RADIOLOGY | Facility: HOSPITAL | Age: 71
Discharge: HOME | End: 2021-08-25
Attending: INTERNAL MEDICINE
Payer: MEDICARE

## 2021-08-25 VITALS — HEIGHT: 64 IN | BODY MASS INDEX: 19.12 KG/M2 | WEIGHT: 112 LBS

## 2021-08-25 DIAGNOSIS — Z12.31 ENCOUNTER FOR SCREENING MAMMOGRAM FOR BREAST CANCER: ICD-10-CM

## 2021-08-25 PROCEDURE — 77067 SCR MAMMO BI INCL CAD: CPT | Mod: 26,,, | Performed by: RADIOLOGY

## 2021-08-25 PROCEDURE — 77063 MAMMO DIGITAL SCREENING BILAT WITH TOMO: ICD-10-PCS | Mod: 26,,, | Performed by: RADIOLOGY

## 2021-08-25 PROCEDURE — 77067 MAMMO DIGITAL SCREENING BILAT WITH TOMO: ICD-10-PCS | Mod: 26,,, | Performed by: RADIOLOGY

## 2021-08-25 PROCEDURE — 77067 SCR MAMMO BI INCL CAD: CPT | Mod: TC

## 2021-08-25 PROCEDURE — 77063 BREAST TOMOSYNTHESIS BI: CPT | Mod: 26,,, | Performed by: RADIOLOGY

## 2021-09-13 ENCOUNTER — PATIENT OUTREACH (OUTPATIENT)
Dept: ADMINISTRATIVE | Facility: HOSPITAL | Age: 71
End: 2021-09-13

## 2021-09-30 ENCOUNTER — OFFICE VISIT (OUTPATIENT)
Dept: OPTOMETRY | Facility: CLINIC | Age: 71
End: 2021-09-30
Payer: MEDICARE

## 2021-09-30 DIAGNOSIS — Z83.511 FAMILY HISTORY OF GLAUCOMA IN FATHER: ICD-10-CM

## 2021-09-30 DIAGNOSIS — E10.9 TYPE 1 DIABETES MELLITUS WITHOUT RETINOPATHY: Primary | ICD-10-CM

## 2021-09-30 DIAGNOSIS — H25.13 NUCLEAR SCLEROSIS OF BOTH EYES: ICD-10-CM

## 2021-09-30 DIAGNOSIS — H52.13 MYOPIA WITH ASTIGMATISM AND PRESBYOPIA, BILATERAL: ICD-10-CM

## 2021-09-30 DIAGNOSIS — H52.4 MYOPIA WITH ASTIGMATISM AND PRESBYOPIA, BILATERAL: ICD-10-CM

## 2021-09-30 DIAGNOSIS — H52.203 MYOPIA WITH ASTIGMATISM AND PRESBYOPIA, BILATERAL: ICD-10-CM

## 2021-09-30 PROCEDURE — 99999 PR PBB SHADOW E&M-EST. PATIENT-LVL III: CPT | Mod: PBBFAC,,, | Performed by: OPTOMETRIST

## 2021-09-30 PROCEDURE — 92014 PR EYE EXAM, EST PATIENT,COMPREHESV: ICD-10-PCS | Mod: S$GLB,,, | Performed by: OPTOMETRIST

## 2021-09-30 PROCEDURE — 4010F PR ACE/ARB THEARPY RXD/TAKEN: ICD-10-PCS | Mod: CPTII,S$GLB,, | Performed by: OPTOMETRIST

## 2021-09-30 PROCEDURE — 99999 PR PBB SHADOW E&M-EST. PATIENT-LVL III: ICD-10-PCS | Mod: PBBFAC,,, | Performed by: OPTOMETRIST

## 2021-09-30 PROCEDURE — 4010F ACE/ARB THERAPY RXD/TAKEN: CPT | Mod: CPTII,S$GLB,, | Performed by: OPTOMETRIST

## 2021-09-30 PROCEDURE — 2023F PR DILATED RETINAL EXAM W/O EVID OF RETINOPATHY: ICD-10-PCS | Mod: CPTII,S$GLB,, | Performed by: OPTOMETRIST

## 2021-09-30 PROCEDURE — 1126F PR PAIN SEVERITY QUANTIFIED, NO PAIN PRESENT: ICD-10-PCS | Mod: CPTII,S$GLB,, | Performed by: OPTOMETRIST

## 2021-09-30 PROCEDURE — 3288F FALL RISK ASSESSMENT DOCD: CPT | Mod: CPTII,S$GLB,, | Performed by: OPTOMETRIST

## 2021-09-30 PROCEDURE — 92015 DETERMINE REFRACTIVE STATE: CPT | Mod: S$GLB,,, | Performed by: OPTOMETRIST

## 2021-09-30 PROCEDURE — 1126F AMNT PAIN NOTED NONE PRSNT: CPT | Mod: CPTII,S$GLB,, | Performed by: OPTOMETRIST

## 2021-09-30 PROCEDURE — 1159F PR MEDICATION LIST DOCUMENTED IN MEDICAL RECORD: ICD-10-PCS | Mod: CPTII,S$GLB,, | Performed by: OPTOMETRIST

## 2021-09-30 PROCEDURE — 3052F PR MOST RECENT HEMOGLOBIN A1C LEVEL 8.0 - < 9.0%: ICD-10-PCS | Mod: CPTII,S$GLB,, | Performed by: OPTOMETRIST

## 2021-09-30 PROCEDURE — 3052F HG A1C>EQUAL 8.0%<EQUAL 9.0%: CPT | Mod: CPTII,S$GLB,, | Performed by: OPTOMETRIST

## 2021-09-30 PROCEDURE — 92015 PR REFRACTION: ICD-10-PCS | Mod: S$GLB,,, | Performed by: OPTOMETRIST

## 2021-09-30 PROCEDURE — 92014 COMPRE OPH EXAM EST PT 1/>: CPT | Mod: S$GLB,,, | Performed by: OPTOMETRIST

## 2021-09-30 PROCEDURE — 2023F DILAT RTA XM W/O RTNOPTHY: CPT | Mod: CPTII,S$GLB,, | Performed by: OPTOMETRIST

## 2021-09-30 PROCEDURE — 1101F PR PT FALLS ASSESS DOC 0-1 FALLS W/OUT INJ PAST YR: ICD-10-PCS | Mod: CPTII,S$GLB,, | Performed by: OPTOMETRIST

## 2021-09-30 PROCEDURE — 1101F PT FALLS ASSESS-DOCD LE1/YR: CPT | Mod: CPTII,S$GLB,, | Performed by: OPTOMETRIST

## 2021-09-30 PROCEDURE — 1159F MED LIST DOCD IN RCRD: CPT | Mod: CPTII,S$GLB,, | Performed by: OPTOMETRIST

## 2021-09-30 PROCEDURE — 3288F PR FALLS RISK ASSESSMENT DOCUMENTED: ICD-10-PCS | Mod: CPTII,S$GLB,, | Performed by: OPTOMETRIST

## 2021-10-05 ENCOUNTER — TELEPHONE (OUTPATIENT)
Dept: INTERNAL MEDICINE | Facility: CLINIC | Age: 71
End: 2021-10-05

## 2021-10-19 ENCOUNTER — IMMUNIZATION (OUTPATIENT)
Dept: INTERNAL MEDICINE | Facility: CLINIC | Age: 71
End: 2021-10-19
Payer: MEDICARE

## 2021-10-19 DIAGNOSIS — Z23 NEED FOR VACCINATION: Primary | ICD-10-CM

## 2021-10-19 PROCEDURE — 91300 COVID-19, MRNA, LNP-S, PF, 30 MCG/0.3 ML DOSE VACCINE: CPT | Mod: PBBFAC | Performed by: INTERNAL MEDICINE

## 2021-10-19 PROCEDURE — 0003A COVID-19, MRNA, LNP-S, PF, 30 MCG/0.3 ML DOSE VACCINE: CPT | Mod: PBBFAC | Performed by: INTERNAL MEDICINE

## 2021-10-21 ENCOUNTER — PATIENT OUTREACH (OUTPATIENT)
Dept: ADMINISTRATIVE | Facility: OTHER | Age: 71
End: 2021-10-21

## 2021-10-22 ENCOUNTER — PES CALL (OUTPATIENT)
Dept: ADMINISTRATIVE | Facility: CLINIC | Age: 71
End: 2021-10-22

## 2021-10-22 ENCOUNTER — OFFICE VISIT (OUTPATIENT)
Dept: OBSTETRICS AND GYNECOLOGY | Facility: CLINIC | Age: 71
End: 2021-10-22
Attending: OBSTETRICS & GYNECOLOGY
Payer: MEDICARE

## 2021-10-22 VITALS
WEIGHT: 113.75 LBS | BODY MASS INDEX: 19.42 KG/M2 | HEIGHT: 64 IN | SYSTOLIC BLOOD PRESSURE: 130 MMHG | DIASTOLIC BLOOD PRESSURE: 60 MMHG

## 2021-10-22 DIAGNOSIS — Z78.0 POSTMENOPAUSAL STATUS: ICD-10-CM

## 2021-10-22 DIAGNOSIS — Z01.419 WOMEN'S ANNUAL ROUTINE GYNECOLOGICAL EXAMINATION: Primary | ICD-10-CM

## 2021-10-22 PROCEDURE — 4010F PR ACE/ARB THEARPY RXD/TAKEN: ICD-10-PCS | Mod: CPTII,S$GLB,, | Performed by: OBSTETRICS & GYNECOLOGY

## 2021-10-22 PROCEDURE — 1101F PT FALLS ASSESS-DOCD LE1/YR: CPT | Mod: CPTII,S$GLB,, | Performed by: OBSTETRICS & GYNECOLOGY

## 2021-10-22 PROCEDURE — 3072F LOW RISK FOR RETINOPATHY: CPT | Mod: CPTII,S$GLB,, | Performed by: OBSTETRICS & GYNECOLOGY

## 2021-10-22 PROCEDURE — 3288F PR FALLS RISK ASSESSMENT DOCUMENTED: ICD-10-PCS | Mod: CPTII,S$GLB,, | Performed by: OBSTETRICS & GYNECOLOGY

## 2021-10-22 PROCEDURE — 1159F MED LIST DOCD IN RCRD: CPT | Mod: CPTII,S$GLB,, | Performed by: OBSTETRICS & GYNECOLOGY

## 2021-10-22 PROCEDURE — 99999 PR PBB SHADOW E&M-EST. PATIENT-LVL IV: CPT | Mod: PBBFAC,,, | Performed by: OBSTETRICS & GYNECOLOGY

## 2021-10-22 PROCEDURE — 1101F PR PT FALLS ASSESS DOC 0-1 FALLS W/OUT INJ PAST YR: ICD-10-PCS | Mod: CPTII,S$GLB,, | Performed by: OBSTETRICS & GYNECOLOGY

## 2021-10-22 PROCEDURE — 3008F BODY MASS INDEX DOCD: CPT | Mod: CPTII,S$GLB,, | Performed by: OBSTETRICS & GYNECOLOGY

## 2021-10-22 PROCEDURE — 3052F PR MOST RECENT HEMOGLOBIN A1C LEVEL 8.0 - < 9.0%: ICD-10-PCS | Mod: CPTII,S$GLB,, | Performed by: OBSTETRICS & GYNECOLOGY

## 2021-10-22 PROCEDURE — 1160F PR REVIEW ALL MEDS BY PRESCRIBER/CLIN PHARMACIST DOCUMENTED: ICD-10-PCS | Mod: CPTII,S$GLB,, | Performed by: OBSTETRICS & GYNECOLOGY

## 2021-10-22 PROCEDURE — 1126F PR PAIN SEVERITY QUANTIFIED, NO PAIN PRESENT: ICD-10-PCS | Mod: CPTII,S$GLB,, | Performed by: OBSTETRICS & GYNECOLOGY

## 2021-10-22 PROCEDURE — 4010F ACE/ARB THERAPY RXD/TAKEN: CPT | Mod: CPTII,S$GLB,, | Performed by: OBSTETRICS & GYNECOLOGY

## 2021-10-22 PROCEDURE — G0101 CA SCREEN;PELVIC/BREAST EXAM: HCPCS | Mod: S$GLB,,, | Performed by: OBSTETRICS & GYNECOLOGY

## 2021-10-22 PROCEDURE — 3072F PR LOW RISK FOR RETINOPATHY: ICD-10-PCS | Mod: CPTII,S$GLB,, | Performed by: OBSTETRICS & GYNECOLOGY

## 2021-10-22 PROCEDURE — 1126F AMNT PAIN NOTED NONE PRSNT: CPT | Mod: CPTII,S$GLB,, | Performed by: OBSTETRICS & GYNECOLOGY

## 2021-10-22 PROCEDURE — 1159F PR MEDICATION LIST DOCUMENTED IN MEDICAL RECORD: ICD-10-PCS | Mod: CPTII,S$GLB,, | Performed by: OBSTETRICS & GYNECOLOGY

## 2021-10-22 PROCEDURE — 3078F PR MOST RECENT DIASTOLIC BLOOD PRESSURE < 80 MM HG: ICD-10-PCS | Mod: CPTII,S$GLB,, | Performed by: OBSTETRICS & GYNECOLOGY

## 2021-10-22 PROCEDURE — 99999 PR PBB SHADOW E&M-EST. PATIENT-LVL IV: ICD-10-PCS | Mod: PBBFAC,,, | Performed by: OBSTETRICS & GYNECOLOGY

## 2021-10-22 PROCEDURE — 3078F DIAST BP <80 MM HG: CPT | Mod: CPTII,S$GLB,, | Performed by: OBSTETRICS & GYNECOLOGY

## 2021-10-22 PROCEDURE — 3075F PR MOST RECENT SYSTOLIC BLOOD PRESS GE 130-139MM HG: ICD-10-PCS | Mod: CPTII,S$GLB,, | Performed by: OBSTETRICS & GYNECOLOGY

## 2021-10-22 PROCEDURE — G0101 PR CA SCREEN;PELVIC/BREAST EXAM: ICD-10-PCS | Mod: S$GLB,,, | Performed by: OBSTETRICS & GYNECOLOGY

## 2021-10-22 PROCEDURE — 3008F PR BODY MASS INDEX (BMI) DOCUMENTED: ICD-10-PCS | Mod: CPTII,S$GLB,, | Performed by: OBSTETRICS & GYNECOLOGY

## 2021-10-22 PROCEDURE — 1160F RVW MEDS BY RX/DR IN RCRD: CPT | Mod: CPTII,S$GLB,, | Performed by: OBSTETRICS & GYNECOLOGY

## 2021-10-22 PROCEDURE — 3052F HG A1C>EQUAL 8.0%<EQUAL 9.0%: CPT | Mod: CPTII,S$GLB,, | Performed by: OBSTETRICS & GYNECOLOGY

## 2021-10-22 PROCEDURE — 3075F SYST BP GE 130 - 139MM HG: CPT | Mod: CPTII,S$GLB,, | Performed by: OBSTETRICS & GYNECOLOGY

## 2021-10-22 PROCEDURE — 3288F FALL RISK ASSESSMENT DOCD: CPT | Mod: CPTII,S$GLB,, | Performed by: OBSTETRICS & GYNECOLOGY

## 2021-11-22 ENCOUNTER — PES CALL (OUTPATIENT)
Dept: ADMINISTRATIVE | Facility: CLINIC | Age: 71
End: 2021-11-22
Payer: MEDICARE

## 2021-11-26 ENCOUNTER — LAB VISIT (OUTPATIENT)
Dept: LAB | Facility: OTHER | Age: 71
End: 2021-11-26
Attending: INTERNAL MEDICINE
Payer: MEDICARE

## 2021-11-26 DIAGNOSIS — E13.9 LATENT AUTOIMMUNE DIABETES IN ADULTS (LADA), MANAGED AS TYPE 1: ICD-10-CM

## 2021-11-26 DIAGNOSIS — E78.2 MIXED HYPERLIPIDEMIA: ICD-10-CM

## 2021-11-26 LAB
ALBUMIN SERPL BCP-MCNC: 3.9 G/DL (ref 3.5–5.2)
ALP SERPL-CCNC: 73 U/L (ref 55–135)
ALT SERPL W/O P-5'-P-CCNC: 17 U/L (ref 10–44)
ANION GAP SERPL CALC-SCNC: 9 MMOL/L (ref 8–16)
AST SERPL-CCNC: 20 U/L (ref 10–40)
BILIRUB SERPL-MCNC: 0.4 MG/DL (ref 0.1–1)
BUN SERPL-MCNC: 23 MG/DL (ref 8–23)
CALCIUM SERPL-MCNC: 9.8 MG/DL (ref 8.7–10.5)
CHLORIDE SERPL-SCNC: 105 MMOL/L (ref 95–110)
CHOLEST SERPL-MCNC: 146 MG/DL (ref 120–199)
CHOLEST/HDLC SERPL: 2 {RATIO} (ref 2–5)
CO2 SERPL-SCNC: 27 MMOL/L (ref 23–29)
CREAT SERPL-MCNC: 0.9 MG/DL (ref 0.5–1.4)
EST. GFR  (AFRICAN AMERICAN): >60 ML/MIN/1.73 M^2
EST. GFR  (NON AFRICAN AMERICAN): >60 ML/MIN/1.73 M^2
ESTIMATED AVG GLUCOSE: 180 MG/DL (ref 68–131)
GLUCOSE SERPL-MCNC: 118 MG/DL (ref 70–110)
HBA1C MFR BLD: 7.9 % (ref 4–5.6)
HDLC SERPL-MCNC: 72 MG/DL (ref 40–75)
HDLC SERPL: 49.3 % (ref 20–50)
LDLC SERPL CALC-MCNC: 67.6 MG/DL (ref 63–159)
NONHDLC SERPL-MCNC: 74 MG/DL
POTASSIUM SERPL-SCNC: 4.3 MMOL/L (ref 3.5–5.1)
PROT SERPL-MCNC: 6.8 G/DL (ref 6–8.4)
SODIUM SERPL-SCNC: 141 MMOL/L (ref 136–145)
TRIGL SERPL-MCNC: 32 MG/DL (ref 30–150)

## 2021-11-26 PROCEDURE — 36415 COLL VENOUS BLD VENIPUNCTURE: CPT | Performed by: INTERNAL MEDICINE

## 2021-11-26 PROCEDURE — 80061 LIPID PANEL: CPT | Performed by: INTERNAL MEDICINE

## 2021-11-26 PROCEDURE — 83036 HEMOGLOBIN GLYCOSYLATED A1C: CPT | Performed by: INTERNAL MEDICINE

## 2021-11-26 PROCEDURE — 80053 COMPREHEN METABOLIC PANEL: CPT | Performed by: INTERNAL MEDICINE

## 2021-12-03 ENCOUNTER — OFFICE VISIT (OUTPATIENT)
Dept: INTERNAL MEDICINE | Facility: CLINIC | Age: 71
End: 2021-12-03
Payer: MEDICARE

## 2021-12-03 ENCOUNTER — PATIENT MESSAGE (OUTPATIENT)
Dept: ENDOCRINOLOGY | Facility: CLINIC | Age: 71
End: 2021-12-03
Payer: MEDICARE

## 2021-12-03 VITALS
WEIGHT: 115.31 LBS | OXYGEN SATURATION: 99 % | BODY MASS INDEX: 19.68 KG/M2 | SYSTOLIC BLOOD PRESSURE: 132 MMHG | DIASTOLIC BLOOD PRESSURE: 58 MMHG | HEART RATE: 55 BPM | HEIGHT: 64 IN

## 2021-12-03 DIAGNOSIS — E78.2 MIXED HYPERLIPIDEMIA: ICD-10-CM

## 2021-12-03 DIAGNOSIS — E10.42 DIABETIC PERIPHERAL NEUROPATHY ASSOCIATED WITH TYPE 1 DIABETES MELLITUS: ICD-10-CM

## 2021-12-03 DIAGNOSIS — I87.2 VENOUS INSUFFICIENCY (CHRONIC) (PERIPHERAL): ICD-10-CM

## 2021-12-03 DIAGNOSIS — I70.0 AORTIC ATHEROSCLEROSIS: ICD-10-CM

## 2021-12-03 DIAGNOSIS — M25.511 CHRONIC RIGHT SHOULDER PAIN: ICD-10-CM

## 2021-12-03 DIAGNOSIS — E13.9 LATENT AUTOIMMUNE DIABETES IN ADULTS (LADA), MANAGED AS TYPE 1: ICD-10-CM

## 2021-12-03 DIAGNOSIS — E06.3 HASHIMOTO'S DISEASE: ICD-10-CM

## 2021-12-03 DIAGNOSIS — I10 ESSENTIAL HYPERTENSION: Primary | ICD-10-CM

## 2021-12-03 DIAGNOSIS — I47.10 SUPRAVENTRICULAR TACHYCARDIA: ICD-10-CM

## 2021-12-03 DIAGNOSIS — G89.29 CHRONIC RIGHT SHOULDER PAIN: ICD-10-CM

## 2021-12-03 DIAGNOSIS — M81.0 OSTEOPOROSIS, POST-MENOPAUSAL: ICD-10-CM

## 2021-12-03 DIAGNOSIS — E05.90 HYPERTHYROIDISM: ICD-10-CM

## 2021-12-03 LAB
ALBUMIN/CREAT UR: NORMAL UG/MG (ref 0–30)
CREAT UR-MCNC: 74 MG/DL (ref 15–325)
MICROALBUMIN UR DL<=1MG/L-MCNC: <5 UG/ML

## 2021-12-03 PROCEDURE — 99999 PR PBB SHADOW E&M-EST. PATIENT-LVL IV: ICD-10-PCS | Mod: PBBFAC,,, | Performed by: INTERNAL MEDICINE

## 2021-12-03 PROCEDURE — 99214 OFFICE O/P EST MOD 30 MIN: CPT | Mod: S$GLB,,, | Performed by: INTERNAL MEDICINE

## 2021-12-03 PROCEDURE — 3072F LOW RISK FOR RETINOPATHY: CPT | Mod: CPTII,S$GLB,, | Performed by: INTERNAL MEDICINE

## 2021-12-03 PROCEDURE — 82570 ASSAY OF URINE CREATININE: CPT | Performed by: INTERNAL MEDICINE

## 2021-12-03 PROCEDURE — 99999 PR PBB SHADOW E&M-EST. PATIENT-LVL IV: CPT | Mod: PBBFAC,,, | Performed by: INTERNAL MEDICINE

## 2021-12-03 PROCEDURE — 4010F ACE/ARB THERAPY RXD/TAKEN: CPT | Mod: CPTII,S$GLB,, | Performed by: INTERNAL MEDICINE

## 2021-12-03 PROCEDURE — 4010F PR ACE/ARB THEARPY RXD/TAKEN: ICD-10-PCS | Mod: CPTII,S$GLB,, | Performed by: INTERNAL MEDICINE

## 2021-12-03 PROCEDURE — 99499 RISK ADDL DX/OHS AUDIT: ICD-10-PCS | Mod: S$GLB,,, | Performed by: INTERNAL MEDICINE

## 2021-12-03 PROCEDURE — 3072F PR LOW RISK FOR RETINOPATHY: ICD-10-PCS | Mod: CPTII,S$GLB,, | Performed by: INTERNAL MEDICINE

## 2021-12-03 PROCEDURE — 99214 PR OFFICE/OUTPT VISIT, EST, LEVL IV, 30-39 MIN: ICD-10-PCS | Mod: S$GLB,,, | Performed by: INTERNAL MEDICINE

## 2021-12-03 PROCEDURE — 99499 UNLISTED E&M SERVICE: CPT | Mod: S$GLB,,, | Performed by: INTERNAL MEDICINE

## 2021-12-06 ENCOUNTER — IMMUNIZATION (OUTPATIENT)
Dept: PHARMACY | Facility: CLINIC | Age: 71
End: 2021-12-06
Payer: MEDICARE

## 2021-12-13 ENCOUNTER — TELEPHONE (OUTPATIENT)
Dept: ADMINISTRATIVE | Facility: CLINIC | Age: 71
End: 2021-12-13
Payer: MEDICARE

## 2021-12-17 DIAGNOSIS — I10 ESSENTIAL HYPERTENSION: ICD-10-CM

## 2021-12-20 ENCOUNTER — TELEPHONE (OUTPATIENT)
Dept: INTERNAL MEDICINE | Facility: CLINIC | Age: 71
End: 2021-12-20
Payer: MEDICARE

## 2021-12-20 RX ORDER — AMLODIPINE BESYLATE 5 MG/1
TABLET ORAL
Qty: 90 TABLET | Refills: 3 | Status: SHIPPED | OUTPATIENT
Start: 2021-12-20 | End: 2022-09-19

## 2022-01-04 ENCOUNTER — TELEPHONE (OUTPATIENT)
Dept: INTERNAL MEDICINE | Facility: CLINIC | Age: 72
End: 2022-01-04
Payer: MEDICARE

## 2022-01-04 NOTE — TELEPHONE ENCOUNTER
"Patient called stating she has a cough, headache, running nose, and left side breast pain that goes to her back, Patient was informed that Dr. Ford first available virtual is on 1/13/22 which she declined. I also offered her a virtual on today with a different provider which she declined stating "don't worry bout". Patient was informed that I will send a message to the doctor. Patient sounded very upset and just wants Dr. Ford to call her to give her some advice.  "

## 2022-01-04 NOTE — TELEPHONE ENCOUNTER
----- Message from Ulysses Brooke sent at 1/4/2022  9:07 AM CST -----  Name of Who is Calling: LICHA CHENEY         What is the request in detail: The patient is calling to speak to the nurse in regards to her testing positive for Covid and would like to get some advice and also request medication. Please advise          Can the clinic reply by [facility]: No         What Number to Call Back if not in [facility]: [phone]

## 2022-01-04 NOTE — TELEPHONE ENCOUNTER
Pt needs appt as she has several different symptoms. Virtual or audio ok with me or another provider today or tomorrow.

## 2022-01-19 ENCOUNTER — TELEPHONE (OUTPATIENT)
Dept: ENDOCRINOLOGY | Facility: CLINIC | Age: 72
End: 2022-01-19
Payer: MEDICARE

## 2022-01-19 NOTE — TELEPHONE ENCOUNTER
----- Message from Familia Banks sent at 1/19/2022  9:02 AM CST -----  Regarding: appt  Contact: pt  Pt requesting a call back in regards to upcoming appt would like to verify advise she received a text saying today.        Pt @[phone]

## 2022-01-20 ENCOUNTER — OFFICE VISIT (OUTPATIENT)
Dept: ENDOCRINOLOGY | Facility: CLINIC | Age: 72
End: 2022-01-20
Payer: MEDICARE

## 2022-01-20 VITALS
OXYGEN SATURATION: 99 % | RESPIRATION RATE: 18 BRPM | BODY MASS INDEX: 19.84 KG/M2 | DIASTOLIC BLOOD PRESSURE: 60 MMHG | WEIGHT: 116.19 LBS | SYSTOLIC BLOOD PRESSURE: 132 MMHG | HEART RATE: 56 BPM | HEIGHT: 64 IN

## 2022-01-20 DIAGNOSIS — E10.9 TYPE 1 DIABETES MELLITUS NOT AT GOAL: ICD-10-CM

## 2022-01-20 DIAGNOSIS — E13.9 LATENT AUTOIMMUNE DIABETES IN ADULTS (LADA), MANAGED AS TYPE 1: ICD-10-CM

## 2022-01-20 DIAGNOSIS — E10.42 DIABETIC PERIPHERAL NEUROPATHY ASSOCIATED WITH TYPE 1 DIABETES MELLITUS: ICD-10-CM

## 2022-01-20 DIAGNOSIS — E05.90 HYPERTHYROIDISM: Primary | ICD-10-CM

## 2022-01-20 DIAGNOSIS — E78.2 MIXED HYPERLIPIDEMIA: Chronic | ICD-10-CM

## 2022-01-20 PROCEDURE — 1126F AMNT PAIN NOTED NONE PRSNT: CPT | Mod: CPTII,S$GLB,, | Performed by: INTERNAL MEDICINE

## 2022-01-20 PROCEDURE — 99499 RISK ADDL DX/OHS AUDIT: ICD-10-PCS | Mod: S$GLB,,, | Performed by: INTERNAL MEDICINE

## 2022-01-20 PROCEDURE — 3072F LOW RISK FOR RETINOPATHY: CPT | Mod: CPTII,S$GLB,, | Performed by: INTERNAL MEDICINE

## 2022-01-20 PROCEDURE — 3008F PR BODY MASS INDEX (BMI) DOCUMENTED: ICD-10-PCS | Mod: CPTII,S$GLB,, | Performed by: INTERNAL MEDICINE

## 2022-01-20 PROCEDURE — 99999 PR PBB SHADOW E&M-EST. PATIENT-LVL IV: ICD-10-PCS | Mod: PBBFAC,,, | Performed by: INTERNAL MEDICINE

## 2022-01-20 PROCEDURE — 3051F HG A1C>EQUAL 7.0%<8.0%: CPT | Mod: CPTII,S$GLB,, | Performed by: INTERNAL MEDICINE

## 2022-01-20 PROCEDURE — 1160F PR REVIEW ALL MEDS BY PRESCRIBER/CLIN PHARMACIST DOCUMENTED: ICD-10-PCS | Mod: CPTII,S$GLB,, | Performed by: INTERNAL MEDICINE

## 2022-01-20 PROCEDURE — 3288F FALL RISK ASSESSMENT DOCD: CPT | Mod: CPTII,S$GLB,, | Performed by: INTERNAL MEDICINE

## 2022-01-20 PROCEDURE — 3078F DIAST BP <80 MM HG: CPT | Mod: CPTII,S$GLB,, | Performed by: INTERNAL MEDICINE

## 2022-01-20 PROCEDURE — 3078F PR MOST RECENT DIASTOLIC BLOOD PRESSURE < 80 MM HG: ICD-10-PCS | Mod: CPTII,S$GLB,, | Performed by: INTERNAL MEDICINE

## 2022-01-20 PROCEDURE — 3008F BODY MASS INDEX DOCD: CPT | Mod: CPTII,S$GLB,, | Performed by: INTERNAL MEDICINE

## 2022-01-20 PROCEDURE — 99214 OFFICE O/P EST MOD 30 MIN: CPT | Mod: S$GLB,,, | Performed by: INTERNAL MEDICINE

## 2022-01-20 PROCEDURE — 3288F PR FALLS RISK ASSESSMENT DOCUMENTED: ICD-10-PCS | Mod: CPTII,S$GLB,, | Performed by: INTERNAL MEDICINE

## 2022-01-20 PROCEDURE — 1101F PT FALLS ASSESS-DOCD LE1/YR: CPT | Mod: CPTII,S$GLB,, | Performed by: INTERNAL MEDICINE

## 2022-01-20 PROCEDURE — 3051F PR MOST RECENT HEMOGLOBIN A1C LEVEL 7.0 - < 8.0%: ICD-10-PCS | Mod: CPTII,S$GLB,, | Performed by: INTERNAL MEDICINE

## 2022-01-20 PROCEDURE — 99214 PR OFFICE/OUTPT VISIT, EST, LEVL IV, 30-39 MIN: ICD-10-PCS | Mod: S$GLB,,, | Performed by: INTERNAL MEDICINE

## 2022-01-20 PROCEDURE — 1159F PR MEDICATION LIST DOCUMENTED IN MEDICAL RECORD: ICD-10-PCS | Mod: CPTII,S$GLB,, | Performed by: INTERNAL MEDICINE

## 2022-01-20 PROCEDURE — 1159F MED LIST DOCD IN RCRD: CPT | Mod: CPTII,S$GLB,, | Performed by: INTERNAL MEDICINE

## 2022-01-20 PROCEDURE — 1126F PR PAIN SEVERITY QUANTIFIED, NO PAIN PRESENT: ICD-10-PCS | Mod: CPTII,S$GLB,, | Performed by: INTERNAL MEDICINE

## 2022-01-20 PROCEDURE — 3075F SYST BP GE 130 - 139MM HG: CPT | Mod: CPTII,S$GLB,, | Performed by: INTERNAL MEDICINE

## 2022-01-20 PROCEDURE — 1160F RVW MEDS BY RX/DR IN RCRD: CPT | Mod: CPTII,S$GLB,, | Performed by: INTERNAL MEDICINE

## 2022-01-20 PROCEDURE — 99499 UNLISTED E&M SERVICE: CPT | Mod: S$GLB,,, | Performed by: INTERNAL MEDICINE

## 2022-01-20 PROCEDURE — 3075F PR MOST RECENT SYSTOLIC BLOOD PRESS GE 130-139MM HG: ICD-10-PCS | Mod: CPTII,S$GLB,, | Performed by: INTERNAL MEDICINE

## 2022-01-20 PROCEDURE — 99999 PR PBB SHADOW E&M-EST. PATIENT-LVL IV: CPT | Mod: PBBFAC,,, | Performed by: INTERNAL MEDICINE

## 2022-01-20 PROCEDURE — 3072F PR LOW RISK FOR RETINOPATHY: ICD-10-PCS | Mod: CPTII,S$GLB,, | Performed by: INTERNAL MEDICINE

## 2022-01-20 PROCEDURE — 1101F PR PT FALLS ASSESS DOC 0-1 FALLS W/OUT INJ PAST YR: ICD-10-PCS | Mod: CPTII,S$GLB,, | Performed by: INTERNAL MEDICINE

## 2022-01-20 RX ORDER — INSULIN LISPRO 100 [IU]/ML
INJECTION, SOLUTION INTRAVENOUS; SUBCUTANEOUS
Qty: 15 ML | Refills: 3 | Status: SHIPPED | OUTPATIENT
Start: 2022-01-20 | End: 2023-05-09

## 2022-01-20 RX ORDER — INSULIN DEGLUDEC 100 U/ML
INJECTION, SOLUTION SUBCUTANEOUS
Qty: 5 PEN | Refills: 3 | Status: SHIPPED | OUTPATIENT
Start: 2022-01-20 | End: 2022-06-10

## 2022-01-20 RX ORDER — METHIMAZOLE 5 MG/1
TABLET ORAL
Qty: 45 TABLET | Refills: 3 | Status: SHIPPED | OUTPATIENT
Start: 2022-01-20 | End: 2023-01-23

## 2022-01-20 NOTE — ASSESSMENT & PLAN NOTE
--Patient with hyperthyroidism secondary to Grave's disease  --TRAb is now undetectable and TSH remains normal  --Will continue methimazole 2.5 mg daily at this time  --TSH with next labs

## 2022-01-20 NOTE — ASSESSMENT & PLAN NOTE
--Patient with NILAY treated as type 1 diabetes  --A1c goal <7.5%  --Recent A1c 7.9%  --She would greatly benefit from a CGM and I have recommended the Dexcom G6 and she would like to get started  --On 4 insulin injections per day, checks glucoses >4 times per day, has frequent hypoglycemia  --Will continue Tresiba 15 units daily  --Will continue Humalog TID AC at 1:13 ICR with low dose correction scale (self-adjusting insulin)  --UTD with foot exam and eye exam  --has glucagon emergency kit

## 2022-01-20 NOTE — PROGRESS NOTES
Subjective:      Patient ID: Sartia Alvarez is a 71 y.o. female.    Chief Complaint:  Diabetes and Hyperthyroidism      History of Present Illness  Ms. Alvarez presents for follow up of NILAY treated as type 1 and hyperthyroidism secondary to Grave's disease. Last visit 10/2020.     Diagnosed with gestational DM at age 42. In 1997, she was diagnosed with T2DM, however workup revealed NILAY and now treated as type 1 diabetes.      , has children, grandchildren, retired.     In regards to diabetes:    No hospitalizations or illnesses since last visit with me.      Checks BG 6-8 times per day (fasting, pre-meal and 1-2 hrs post-meal)  Reviewed meter:  AM: 169, 177, 154, 112  Lunch 117, 142, 91  Dinner 164, 275,181  Bedtime 168,172     Has had hypoglycemia in the 60's 1-2 times per week. Happens during the day while more active. She will take less insulin with the preceding meals if she knows she is going to be active. No nocturnal hypoglycemia since last visit but she wakes up frequently to check her glucose at night.  Able to recognize and treat. No hypoglycemia overnight.     Eats breakfast, lunch, and dinner. Counts carbs for meals/snacks.      Doesn't miss any insulin doses.      No interest in insulin pump.      CURRENT DIABETIC MEDS: Tresiba 15 units QAM, Novolog 2-5 units based on 1:13 I:C ratio, goal 100, ISF 50  Very sensitive to insulin  Since she is very sensitive to insulin      Uses Vials or Pens: Pens  Type of Glucose Meter: Relion Meter      Last Eye Exam: 9/2021, no retinopathy  Last Podiatry Exam: 12/2021  DM Education: May 2017      Diabetes Management Status    Statin: Taking  ACE/ARB: Taking    Screening or Prevention Patient's value Goal Complete/Controlled?   HgA1C Testing and Control   Lab Results   Component Value Date    HGBA1C 7.9 (H) 11/26/2021      Annually/Less than 8% Yes   Lipid profile : 11/26/2021 Annually Yes   LDL control Lab Results   Component Value Date    LDLCALC 67.6  11/26/2021    Annually/Less than 100 mg/dl  Yes   Nephropathy screening Lab Results   Component Value Date    LABMICR <5.0 12/03/2021     Lab Results   Component Value Date    PROTEINUA Negative 09/21/2015     No results found for: UTPCR   Annually Yes   Blood pressure BP Readings from Last 1 Encounters:   01/20/22 132/60    Less than 140/90 Yes   Dilated retinal exam : 09/30/2021 Annually Yes   Foot exam   : 12/03/2021 Annually Yes             In regards to hyperthyroidism:  Diagnosed with Grave's disease in 1/2013.      Initial labs at diagnosis:  Results for LICHA CHENEY (MRN [id number]) as of 6/16/2020 09:06    Ref. Range 1/7/2013 09:16 2/5/2013 16:20   TSH Latest Ref Range: 0.4 - 4.0 uIU/ml <0.010 (L)     Free T4 Latest Ref Range: 0.71 - 1.51 ng/dl 2.43 (H)     Thyroperoxidase Antibodies Latest Ref Range: 0 - 6 IU/mL   740.7 (H)   Thyroid-Stim IG Quantitative Latest Ref Range: <140 %   373 (H)      Currently on methimazole 5 mg once daily.   Denies palpitations or tremor.     Results for LICHA CHENEY (MRN [id number]) as of 1/20/2022 07:36   Ref. Range 1/19/2021 08:44   TSH Latest Ref Range: 0.400 - 4.000 uIU/mL 3.934       Review of Systems   Constitutional: Negative for chills and fever.   Gastrointestinal: Negative for nausea.       Objective:   Physical Exam  Vitals and nursing note reviewed.       BP Readings from Last 3 Encounters:   01/20/22 132/60   12/03/21 (!) 132/58   10/22/21 130/60     Wt Readings from Last 1 Encounters:   01/20/22 1129 52.7 kg (116 lb 2.9 oz)       Body mass index is 19.94 kg/m².    Lab Review:   Lab Results   Component Value Date    HGBA1C 7.9 (H) 11/26/2021     Lab Results   Component Value Date    CHOL 146 11/26/2021    HDL 72 11/26/2021    LDLCALC 67.6 11/26/2021    TRIG 32 11/26/2021    CHOLHDL 49.3 11/26/2021     Lab Results   Component Value Date     11/26/2021    K 4.3 11/26/2021     11/26/2021    CO2 27 11/26/2021     (H) 11/26/2021    BUN 23  11/26/2021    CREATININE 0.9 11/26/2021    CALCIUM 9.8 11/26/2021    PROT 6.8 11/26/2021    ALBUMIN 3.9 11/26/2021    BILITOT 0.4 11/26/2021    ALKPHOS 73 11/26/2021    AST 20 11/26/2021    ALT 17 11/26/2021    ANIONGAP 9 11/26/2021    ESTGFRAFRICA >60 11/26/2021    EGFRNONAA >60 11/26/2021    TSH 3.934 01/19/2021         Assessment and Plan     Hyperthyroidism  --Patient with hyperthyroidism secondary to Grave's disease  --TRAb is now undetectable and TSH remains normal  --Will continue methimazole 2.5 mg daily at this time  --TSH with next labs    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), managed as type 1  --Patient with NILAY treated as type 1 diabetes  --A1c goal <7.5%  --Recent A1c 7.9%  --She would greatly benefit from a CGM and I have recommended the Dexcom G6 and she would like to get started  --On 4 insulin injections per day, checks glucoses >4 times per day, has frequent hypoglycemia  --Will continue Tresiba 15 units daily  --Will continue Humalog TID AC at 1:13 ICR with low dose correction scale (self-adjusting insulin)  --UTD with foot exam and eye exam  --has glucagon emergency kit    Diabetic peripheral neuropathy associated with type 1 diabetes mellitus  --up to date with eye exam  --No history of foot ulcer  --Better long term blood glucose control to prevent progression    Mixed hyperlipidemia  --On statin per ADA recs      TSH and A1c in 1 month, follow up in 4 months with TSH and A1c prior to appt, needs referral to education when able to review Dexcom      Ronald Koch M.D. Staff Endocrinology

## 2022-01-21 RX ORDER — BLOOD-GLUCOSE TRANSMITTER
1 EACH MISCELLANEOUS
Qty: 2 EACH | Refills: 1 | Status: SHIPPED | OUTPATIENT
Start: 2022-01-21 | End: 2023-11-01

## 2022-01-21 RX ORDER — BLOOD-GLUCOSE SENSOR
3 EACH MISCELLANEOUS
Qty: 3 EACH | Refills: 11 | Status: SHIPPED | OUTPATIENT
Start: 2022-01-21 | End: 2023-06-14

## 2022-01-27 ENCOUNTER — PES CALL (OUTPATIENT)
Dept: ADMINISTRATIVE | Facility: CLINIC | Age: 72
End: 2022-01-27
Payer: MEDICARE

## 2022-01-27 ENCOUNTER — TELEPHONE (OUTPATIENT)
Dept: ENDOCRINOLOGY | Facility: CLINIC | Age: 72
End: 2022-01-27
Payer: MEDICARE

## 2022-01-28 ENCOUNTER — PATIENT OUTREACH (OUTPATIENT)
Dept: ADMINISTRATIVE | Facility: OTHER | Age: 72
End: 2022-01-28
Payer: MEDICARE

## 2022-01-28 NOTE — PROGRESS NOTES
Health Maintenance Due   Topic Date Due    TETANUS VACCINE  Never done    Shingles Vaccine (2 of 3) 03/02/2016     Updates were requested from care everywhere.  Chart was reviewed for overdue Proactive [facility] Encounters (BRENDA) topics (CRS, Breast Cancer Screening, Eye exam)  Health Maintenance has been updated.  LINKS immunization registry triggered.  Immunizations were reconciled.

## 2022-01-31 ENCOUNTER — CLINICAL SUPPORT (OUTPATIENT)
Dept: DIABETES | Facility: CLINIC | Age: 72
End: 2022-01-31
Payer: MEDICARE

## 2022-01-31 VITALS — BODY MASS INDEX: 19.75 KG/M2 | WEIGHT: 115.06 LBS

## 2022-01-31 DIAGNOSIS — E13.9 LATENT AUTOIMMUNE DIABETES IN ADULTS (LADA), MANAGED AS TYPE 1: ICD-10-CM

## 2022-01-31 PROCEDURE — 99999 PR PBB SHADOW E&M-EST. PATIENT-LVL I: CPT | Mod: PBBFAC,,,

## 2022-01-31 PROCEDURE — G0108 PR DIAB MANAGE TRN  PER INDIV: ICD-10-PCS | Mod: S$GLB,,, | Performed by: INTERNAL MEDICINE

## 2022-01-31 PROCEDURE — G0108 DIAB MANAGE TRN  PER INDIV: HCPCS | Mod: S$GLB,,, | Performed by: INTERNAL MEDICINE

## 2022-01-31 PROCEDURE — 99999 PR PBB SHADOW E&M-EST. PATIENT-LVL I: ICD-10-PCS | Mod: PBBFAC,,,

## 2022-01-31 NOTE — PROGRESS NOTES
Diabetes Care Specialist Progress Note  Author: Liz Ellison RN, CDE  Date: 1/31/2022    Program Intake  Reason for Diabetes Program Visit:: Initial Diabetes Assessment  Current diabetes risk level:: moderate  In the last 12 months, have you:: none  Permission to speak with others about care:: no    Lab Results   Component Value Date    HGBA1C 7.9 (H) 11/26/2021       Clinical    Patient Health Rating  Compared to other people your age, how would you rate your health?: Good    Problem Review  Reviewed Problem List with Patient: yes  Active comorbidities affecting diabetes self-care.: yes  Comorbidities: Cardiovascular Disease,Hypertension  Reviewed health maintenance: yes    Clinical Assessment  Current Diabetes Treatment: Insulin (Tresiba 15 units in evening: humalog IC ratio 1:13 ISF 1:45 Target 100) pt insist that her ISF is 45 not 50 per chart.   Have you ever experienced hypoglycemia (low blood sugar)?: yes  In the last month, how often have you experienced low blood sugar?: once every other week  Are you able to tell when your blood sugar is low?: Yes  What symptoms do you experience?: Sweaty,Hungry  Have you ever been hospitalized because your blood sugar was too low?: no  How do you treat hypoglycemia (low blood sugar)?: other (eats 4 orin crackers)    Medication Information  How do you obtain your medications?: Patient drives  How many days a week do you miss your medications?: Never  Do you sometimes have difficulty refilling your medications?: No  Medication adherence impacting ability to self-manage diabetes?: No      Nutritional Status  Diet: Diabetic diet (Carb counts and calculates dosage using IC ratio)  Recent Changes in Weight: No Recent Weight Change  Current nutritional status an area of need that is impacting patient's ability to self-manage diabetes?: No    Additional Social History    Support  Does anyone support you with your diabetes care?: yes  Who supports you?: family member  Who  takes you to your medical appointments?: self  Does the current support meet the patient's needs?: Yes  Is Support an area impacting ability to self-manage diabetes?: No    Access to Mass Media & Technology  Does the patient have access to any of the following devices or technologies?: Smart phone  Media or technology needs impacting ability to self-manage diabetes?: No    Cognitive/Behavioral Health  Alert and Oriented: Yes  Difficulty Thinking: No  Requires Prompting: No  Requires assistance for routine expression?: No  Cognitive or behavioral barriers impacting ability to self-manage diabetes?: No    Culture/Latter day  Culture or Congregational beliefs that may impact ability to access healthcare: No    Communication  Language preference: English  Hearing Problems: No  Vision Problems: No  Communication needs impacting ability to self-manage diabetes?: No    Health Literacy  Preferred Learning Method: Face to Face,Demonstration,Reading Materials  How often do you need to have someone help you read instructions, pamphlets, or written material from your doctor or pharmacy?: Never  Health literacy needs impacting ability to self-manage diabetes?: No      Diabetes Self-Management Skills Assessment    Diabetes Disease Process/Treatment Options  Patient/caregiver able to state what happens when someone has diabetes.: yes  Patient/caregiver knows what type of diabetes they have.: yes  Diabetes Type : Type I  Patient/caregiver able to identify at least three signs and symptoms of diabetes.: yes  Identified signs and symptoms:: increased thirst,frequent urination,frequent infections  Patient able to identify at least three risk factors for diabetes.: yes  Identified risk factors:: age over 40,history of gestational diabetes  Diabetes Disease Process/Treatment Options: Skills Assessment Completed: Yes  Assessment indicates:: Adequate understanding  Area of need?: No    Nutrition/Healthy Eating  Method of carbohydrate  measurement:: carb counting/reading labels  Patient can identify foods that impact blood sugar.: yes  Patient-identified foods:: sweets,fruit/fruit juice,yogurt,milk,soda,starchy vegetables (corn, peas, beans),starches (bread, pasta, rice, cereal)  Nutrition/Healthy Eating Skills Assessment Completed:: Yes  Assessment indicates:: Adequate understanding  Area of need?: No    Physical Activity/Exercise  Patient's daily activity level:: moderately active  Patient formally exercises outside of work.: yes  Exercise Type: walking  Intensity: Low  Frequency: four or more times a week  Patient can identify forms of physical activity.: yes  Stated forms of physical activity:: any movement performed by muscles that uses energy  Patient can identify reasons why exercise/physical activity is important in diabetes management.: yes  Identified reasons:: lowers blood glucose, blood pressure, and cholesterol,strengthens heart, muscles, and bones  Physical Activity/Exercise Skills Assessment Completed: : Yes  Assessment indicates:: Adequate understanding  Area of need?: No    Medications  Patient is able to describe current diabetes management routine.: yes  Diabetes management routine:: insulin  Patient is able to identify current diabetes medications, dosages, and appropriate timing of medications.: yes  Patient understands the purpose of the medications taken for diabetes.: yes  Patient reports problems or concerns with current medication regimen.: no  Medication Skills Assessment Completed:: Yes  Assessment indicates:: Adequate understanding  Area of need?: No    Home Blood Glucose Monitoring  Patient states that blood sugar is checked at home daily.: yes  Monitoring Method:: home glucometer  Home glucometer meter type:: Insurance preferred meter  How often do you check your blood sugar?: Other (comment) (checks glucose 6-8 x per day)  When do you check your blood sugar?: Before breakfast,Before lunch,Before dinner,2 hours after  meal,Before bedtime,Other (Checks her overnight glucoses)  When you check what is your typical blood sugar range? : see attached  Blood glucose logs:: no,encouraged to keep logs,encouraged to bring logs to provider visits  Blood glucose logs reviewed today?: no  Home Blood Glucose Monitoring Skills Assessment Completed: : Yes  Assessment indicates:: Knowledge deficit  Area of need?: Yes      Checks BG 6-8 times per day (fasting, pre-meal and 1-2 hrs post-meal)  Reviewed meter:  AM: 169, 177, 154, 112  Lunch 117, 142, 91  Dinner 164, 275,181  Bedtime 168,172    Acute Complications  Patient is able to identify types of acute complications: Yes  Patient Identified:: Hypoglycemia  Patient is able to state the basic meaning of hypoglycemia?: Yes  Able to state the blood sugar range for hypoglycemia?: yes  Patient stated range:: in the 60's  Patient can identify general symptoms of hypoglycemia: yes  Patient identified:: shakiness  Able to state proper treatment of hypoglycemia?: yes  Patient identified:: other (see comments),4 glucose tablets (Eats 4 orin crackers)  Acute Complications Skills Assessment Completed: : Yes  Assessment indicates:: Adequate understanding  Area of need?: No    Chronic Complications  Chronic Complications Skills Assessment Completed: : No  Deferred due to:: Time    Psychosocial/Coping  Psychosocial/Coping Skills Assessment Completed: : No  Deffered due to:: Time      Diabetes Self Support Plan         Assessment Summary and Plan    Based on today's diabetes care assessment, the following areas of need were identified:      Social 1/31/2022   Support No   Access to Mass Media/Tech No   Cognitive/Behavioral Health No   Culture/Alevism No   Communication No   Health Literacy No        Clinical 1/31/2022   Medication Adherence No   Nutritional Status No        Diabetes Self-Management Skills 1/31/2022   Diabetes Disease Process/Treatment Options No   Nutrition/Healthy Eating No   Physical  Activity/Exercise No   Medication No   Home Blood Glucose Monitoring Yes, see care planning   Acute Complications No          Today's interventions were provided through individual discussion, instruction, and written materials were provided.      Patient verbalized understanding of instruction and written materials.  Pt was able to return back demonstration of instructions today. Patient understood key points, needs reinforcement and further instruction.     Diabetes Self-Management Care Plan:    Today's Diabetes Self-Management Care Plan was developed with Sarita's input. Sarita has agreed to work toward the following goal(s) to improve his/her overall diabetes control.      Care Plan: Diabetes Management   Updates made since 1/31/2022 12:00 AM      Problem: Blood Glucose Self-Monitoring       Goal: Patient agrees to check and record blood sugars 4-5 times per day.    Start Date: 1/31/2022   Priority: Low   Barriers: Lack of Motivation to Change   Note:    Patient seen in clinic today.  Endo ordered Dexcom system from pharmacy.  Patient not aware of device.  We reviewed how the Dexcom works, insertion of 10 day sensor, using phone as . Explained she wears the sensor for 10 days, can bathe, shower/swim, no exposure to x-rays.  She was show demo on how device work.  She will think about and see if she would like to move forward. Stated that she feels intimidated by tech. Provided phone number to call should she decides to move forward.        Follow Up Plan     Patient will call should she decide to move forward with Dexcom    Today's care plan and follow up schedule was discussed with patient.  Sarita verbalized understanding of the care plan, goals, and agrees to follow up plan.        The patient was encouraged to communicate with his/her health care provider/physician and care team regarding his/her condition(s) and treatment.  I provided the patient with my contact information today and encouraged to  contact me via phone or [facility] Patient Portal as needed.     Length of Visit   Total Time:60 Minutes

## 2022-02-21 ENCOUNTER — OFFICE VISIT (OUTPATIENT)
Dept: INTERNAL MEDICINE | Facility: CLINIC | Age: 72
End: 2022-02-21
Payer: MEDICARE

## 2022-02-21 ENCOUNTER — LAB VISIT (OUTPATIENT)
Dept: LAB | Facility: OTHER | Age: 72
End: 2022-02-21
Attending: INTERNAL MEDICINE
Payer: MEDICARE

## 2022-02-21 VITALS
BODY MASS INDEX: 20.46 KG/M2 | OXYGEN SATURATION: 100 % | WEIGHT: 115.5 LBS | SYSTOLIC BLOOD PRESSURE: 132 MMHG | DIASTOLIC BLOOD PRESSURE: 61 MMHG | HEART RATE: 55 BPM | HEIGHT: 63 IN

## 2022-02-21 DIAGNOSIS — Z98.890 STATUS POST CATHETER ABLATION OF SLOW PATHWAY: ICD-10-CM

## 2022-02-21 DIAGNOSIS — E10.42 DIABETIC PERIPHERAL NEUROPATHY ASSOCIATED WITH TYPE 1 DIABETES MELLITUS: ICD-10-CM

## 2022-02-21 DIAGNOSIS — E05.90 HYPERTHYROIDISM: ICD-10-CM

## 2022-02-21 DIAGNOSIS — M81.0 OSTEOPOROSIS, POST-MENOPAUSAL: Chronic | ICD-10-CM

## 2022-02-21 DIAGNOSIS — E13.9 LATENT AUTOIMMUNE DIABETES IN ADULTS (LADA), MANAGED AS TYPE 1: ICD-10-CM

## 2022-02-21 DIAGNOSIS — Z86.79 STATUS POST CATHETER ABLATION OF SLOW PATHWAY: ICD-10-CM

## 2022-02-21 DIAGNOSIS — M25.511 CHRONIC RIGHT SHOULDER PAIN: ICD-10-CM

## 2022-02-21 DIAGNOSIS — H25.9 AGE-RELATED CATARACT OF BOTH EYES, UNSPECIFIED AGE-RELATED CATARACT TYPE: ICD-10-CM

## 2022-02-21 DIAGNOSIS — I87.2 VENOUS INSUFFICIENCY (CHRONIC) (PERIPHERAL): Chronic | ICD-10-CM

## 2022-02-21 DIAGNOSIS — I70.0 AORTIC ATHEROSCLEROSIS: ICD-10-CM

## 2022-02-21 DIAGNOSIS — E06.3 HASHIMOTO'S DISEASE: ICD-10-CM

## 2022-02-21 DIAGNOSIS — G89.29 CHRONIC RIGHT SHOULDER PAIN: ICD-10-CM

## 2022-02-21 DIAGNOSIS — Z00.00 ENCOUNTER FOR PREVENTIVE HEALTH EXAMINATION: Primary | ICD-10-CM

## 2022-02-21 DIAGNOSIS — E78.2 MIXED HYPERLIPIDEMIA: Chronic | ICD-10-CM

## 2022-02-21 DIAGNOSIS — S16.1XXA STRAIN OF NECK MUSCLE, INITIAL ENCOUNTER: ICD-10-CM

## 2022-02-21 DIAGNOSIS — I10 ESSENTIAL HYPERTENSION: Chronic | ICD-10-CM

## 2022-02-21 DIAGNOSIS — I47.10 SUPRAVENTRICULAR TACHYCARDIA: ICD-10-CM

## 2022-02-21 LAB
ESTIMATED AVG GLUCOSE: 183 MG/DL (ref 68–131)
HBA1C MFR BLD: 8 % (ref 4–5.6)

## 2022-02-21 PROCEDURE — 4010F ACE/ARB THERAPY RXD/TAKEN: CPT | Mod: CPTII,S$GLB,, | Performed by: NURSE PRACTITIONER

## 2022-02-21 PROCEDURE — 3072F LOW RISK FOR RETINOPATHY: CPT | Mod: CPTII,S$GLB,, | Performed by: NURSE PRACTITIONER

## 2022-02-21 PROCEDURE — 4010F PR ACE/ARB THEARPY RXD/TAKEN: ICD-10-PCS | Mod: CPTII,S$GLB,, | Performed by: NURSE PRACTITIONER

## 2022-02-21 PROCEDURE — 1160F PR REVIEW ALL MEDS BY PRESCRIBER/CLIN PHARMACIST DOCUMENTED: ICD-10-PCS | Mod: CPTII,S$GLB,, | Performed by: NURSE PRACTITIONER

## 2022-02-21 PROCEDURE — 36415 COLL VENOUS BLD VENIPUNCTURE: CPT | Performed by: INTERNAL MEDICINE

## 2022-02-21 PROCEDURE — 3078F PR MOST RECENT DIASTOLIC BLOOD PRESSURE < 80 MM HG: ICD-10-PCS | Mod: CPTII,S$GLB,, | Performed by: NURSE PRACTITIONER

## 2022-02-21 PROCEDURE — 1160F RVW MEDS BY RX/DR IN RCRD: CPT | Mod: CPTII,S$GLB,, | Performed by: NURSE PRACTITIONER

## 2022-02-21 PROCEDURE — 3051F HG A1C>EQUAL 7.0%<8.0%: CPT | Mod: CPTII,S$GLB,, | Performed by: NURSE PRACTITIONER

## 2022-02-21 PROCEDURE — 99999 PR PBB SHADOW E&M-EST. PATIENT-LVL V: ICD-10-PCS | Mod: PBBFAC,,, | Performed by: NURSE PRACTITIONER

## 2022-02-21 PROCEDURE — G0439 PPPS, SUBSEQ VISIT: HCPCS | Mod: S$GLB,,, | Performed by: NURSE PRACTITIONER

## 2022-02-21 PROCEDURE — 1159F PR MEDICATION LIST DOCUMENTED IN MEDICAL RECORD: ICD-10-PCS | Mod: CPTII,S$GLB,, | Performed by: NURSE PRACTITIONER

## 2022-02-21 PROCEDURE — 83036 HEMOGLOBIN GLYCOSYLATED A1C: CPT | Performed by: INTERNAL MEDICINE

## 2022-02-21 PROCEDURE — 3288F FALL RISK ASSESSMENT DOCD: CPT | Mod: CPTII,S$GLB,, | Performed by: NURSE PRACTITIONER

## 2022-02-21 PROCEDURE — 3072F PR LOW RISK FOR RETINOPATHY: ICD-10-PCS | Mod: CPTII,S$GLB,, | Performed by: NURSE PRACTITIONER

## 2022-02-21 PROCEDURE — 3078F DIAST BP <80 MM HG: CPT | Mod: CPTII,S$GLB,, | Performed by: NURSE PRACTITIONER

## 2022-02-21 PROCEDURE — 1170F PR FUNCTIONAL STATUS ASSESSED: ICD-10-PCS | Mod: CPTII,S$GLB,, | Performed by: NURSE PRACTITIONER

## 2022-02-21 PROCEDURE — 3288F PR FALLS RISK ASSESSMENT DOCUMENTED: ICD-10-PCS | Mod: CPTII,S$GLB,, | Performed by: NURSE PRACTITIONER

## 2022-02-21 PROCEDURE — 3051F PR MOST RECENT HEMOGLOBIN A1C LEVEL 7.0 - < 8.0%: ICD-10-PCS | Mod: CPTII,S$GLB,, | Performed by: NURSE PRACTITIONER

## 2022-02-21 PROCEDURE — 1159F MED LIST DOCD IN RCRD: CPT | Mod: CPTII,S$GLB,, | Performed by: NURSE PRACTITIONER

## 2022-02-21 PROCEDURE — 1101F PR PT FALLS ASSESS DOC 0-1 FALLS W/OUT INJ PAST YR: ICD-10-PCS | Mod: CPTII,S$GLB,, | Performed by: NURSE PRACTITIONER

## 2022-02-21 PROCEDURE — 3075F SYST BP GE 130 - 139MM HG: CPT | Mod: CPTII,S$GLB,, | Performed by: NURSE PRACTITIONER

## 2022-02-21 PROCEDURE — 1101F PT FALLS ASSESS-DOCD LE1/YR: CPT | Mod: CPTII,S$GLB,, | Performed by: NURSE PRACTITIONER

## 2022-02-21 PROCEDURE — 1126F PR PAIN SEVERITY QUANTIFIED, NO PAIN PRESENT: ICD-10-PCS | Mod: CPTII,S$GLB,, | Performed by: NURSE PRACTITIONER

## 2022-02-21 PROCEDURE — 99999 PR PBB SHADOW E&M-EST. PATIENT-LVL V: CPT | Mod: PBBFAC,,, | Performed by: NURSE PRACTITIONER

## 2022-02-21 PROCEDURE — 3008F BODY MASS INDEX DOCD: CPT | Mod: CPTII,S$GLB,, | Performed by: NURSE PRACTITIONER

## 2022-02-21 PROCEDURE — G0439 PR MEDICARE ANNUAL WELLNESS SUBSEQUENT VISIT: ICD-10-PCS | Mod: S$GLB,,, | Performed by: NURSE PRACTITIONER

## 2022-02-21 PROCEDURE — 3008F PR BODY MASS INDEX (BMI) DOCUMENTED: ICD-10-PCS | Mod: CPTII,S$GLB,, | Performed by: NURSE PRACTITIONER

## 2022-02-21 PROCEDURE — 1126F AMNT PAIN NOTED NONE PRSNT: CPT | Mod: CPTII,S$GLB,, | Performed by: NURSE PRACTITIONER

## 2022-02-21 PROCEDURE — 1170F FXNL STATUS ASSESSED: CPT | Mod: CPTII,S$GLB,, | Performed by: NURSE PRACTITIONER

## 2022-02-21 PROCEDURE — 3075F PR MOST RECENT SYSTOLIC BLOOD PRESS GE 130-139MM HG: ICD-10-PCS | Mod: CPTII,S$GLB,, | Performed by: NURSE PRACTITIONER

## 2022-02-21 NOTE — PROGRESS NOTES
"  Sarita Alvarez presented for a  Medicare AWV and comprehensive Health Risk Assessment today. The following components were reviewed and updated:    · Medical history  · Family History  · Social history  · Allergies and Current Medications  · Health Risk Assessment  · Health Maintenance  · Care Team         ** See Completed Assessments for Annual Wellness Visit within the encounter summary.**         The following assessments were completed:  · Living Situation  · CAGE  · Depression Screening  · Timed Get Up and Go  · Whisper Test  · Cognitive Function Screening  · Nutrition Screening  · ADL Screening  · PAQ Screening            Vitals:    02/21/22 0933 02/21/22 1021   BP: (!) 140/65 132/61   BP Location: Right arm    Patient Position: Sitting    Pulse: (!) 55    SpO2: 100%    Weight: 52.4 kg (115 lb 8.3 oz)    Height: 5' 3" (1.6 m)      Body mass index is 20.46 kg/m².     Physical Exam  Constitutional:       Appearance: She is well-developed.   HENT:      Head: Normocephalic and atraumatic. Not macrocephalic and not microcephalic. No raccoon eyes, Kay's sign, abrasion, contusion, right periorbital erythema, left periorbital erythema or laceration. Hair is normal.      Right Ear: No decreased hearing noted. No laceration, drainage, swelling or tenderness. No middle ear effusion. No foreign body. No mastoid tenderness. No hemotympanum. Tympanic membrane is not injected, scarred, perforated, erythematous, retracted or bulging. Tympanic membrane has normal mobility.      Left Ear: No decreased hearing noted. No laceration, drainage, swelling or tenderness.  No middle ear effusion. No foreign body. No mastoid tenderness. No hemotympanum. Tympanic membrane is not injected, scarred, perforated, erythematous, retracted or bulging. Tympanic membrane has normal mobility.      Nose: Nose normal. No nasal deformity, laceration, mucosal edema or rhinorrhea.      Mouth/Throat:      Pharynx: Uvula midline.   Eyes:      " General: Lids are normal. No scleral icterus.     Conjunctiva/sclera: Conjunctivae normal.   Neck:      Thyroid: No thyroid mass or thyromegaly.      Trachea: Trachea normal.   Cardiovascular:      Rate and Rhythm: Normal rate and regular rhythm.      Heart sounds: No murmur heard.    No friction rub. No gallop.   Pulmonary:      Effort: Pulmonary effort is normal. No respiratory distress.      Breath sounds: Normal breath sounds. No stridor. No wheezing, rhonchi or rales.   Chest:      Chest wall: No tenderness.   Abdominal:      Palpations: Abdomen is soft.   Musculoskeletal:         General: Normal range of motion.      Cervical back: Neck supple. No edema or erythema. No spinous process tenderness or muscular tenderness. Normal range of motion.   Lymphadenopathy:      Head:      Right side of head: No submental, submandibular, tonsillar, preauricular or posterior auricular adenopathy.      Left side of head: No submental, submandibular, tonsillar, preauricular, posterior auricular or occipital adenopathy.   Skin:     General: Skin is warm and dry.   Neurological:      Mental Status: She is alert and oriented to person, place, and time.   Psychiatric:         Behavior: Behavior normal.         Thought Content: Thought content normal.         Judgment: Judgment normal.             Diagnoses and health risks identified today and associated recommendations/orders:    1. Encounter for preventive health examination  Annual Health Risk Assessment (HRA) visit today.  Counseling and referral of health maintenance and preventative health measures performed.  Patient given annual wellness paperwork to take home.  Encouraged to return in 1 year for subsequent HRA visit.     2. Aortic atherosclerosis  Chronic. Stable. Continue current treatment plan as previously prescribed by PCP.    3. Supraventricular tachycardia  Chronic. Stable. Continue current treatment plan as previously prescribed by PCP.    4. Venous insufficiency  (chronic) (peripheral)  Chronic. Stable. Continue current treatment plan as previously prescribed by PCP.    5. Diabetic peripheral neuropathy associated with type 1 diabetes mellitus  Chronic. Stable. Uncontrolled. Last Hgb A1c=7.9 from 11/26/21. Continue current treatment plan as previously prescribed by PCP.    6. Age-related cataract of both eyes, unspecified age-related cataract type  Chronic. Stable. Continue current treatment plan as previously prescribed by PCP.    7. Essential hypertension  Chronic. Stable. Controlled. Encouraged to increase exercise as tolerated (moderate-intensity aerobic activity and muscle-strengthening activities) improve diet to heart healthy, low sodium diet. Continue current treatment plan as previously prescribed by PCP.    8. Mixed hyperlipidemia  Chronic. Stable. Continue current treatment plan as previously prescribed by PCP.    9. Status post catheter ablation of slow pathway  Chronic. Stable. Continue current treatment plan as previously prescribed by PCP.    10. 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 Stable. Continue current treatment plan as previously prescribed by PCP.    11. Hyperthyroidism  Chronic. Stable. Continue current treatment plan as previously prescribed by PCP.    12. Hashimoto's disease  Chronic. Stable. Continue current treatment plan as previously prescribed by PCP.    13. Osteoporosis, post-menopausal  Chronic. Stable. Continue current treatment plan as previously prescribed by PCP.    14. Strain of neck muscle, initial encounter  Chronic. Stable. Continue current treatment plan as previously prescribed by PCP.    15. Chronic right shoulder pain  Chronic. Stable. Continue current treatment plan as previously prescribed by PCP.        Provided Sarita with a 5-10 year written screening schedule and personal prevention plan. Recommendations were developed using the USPSTF age appropriate recommendations. Education,  counseling, and referrals were provided as needed. After Visit Summary printed and given to patient which includes a list of additional screenings\tests needed.      I offered to discuss end of life issues, including information on how to make advance directives that the patient could use to name someone who would make medical decisions on their behalf if they became too ill to make themselves.    ___Patient declined  _X_Patient is interested, I provided paper work and offered to discuss.    Follow up in about 1 year (around 2/21/2023).    Noe Reese NP  I offered to discuss advanced care planning, including how to pick a person who would make decisions for you if you were unable to make them for yourself, called a health care power of , and what kind of decisions you might make such as use of life sustaining treatments such as ventilators and tube feeding when faced with a life limiting illness recorded on a living will that they will need to know. (How you want to be cared for as you near the end of your natural life)     X Patient is interested in learning more about how to make advanced directives.  I provided them paperwork and offered to discuss this with them.

## 2022-02-23 ENCOUNTER — PATIENT MESSAGE (OUTPATIENT)
Dept: ENDOCRINOLOGY | Facility: CLINIC | Age: 72
End: 2022-02-23
Payer: MEDICARE

## 2022-02-23 DIAGNOSIS — D84.9 IMMUNOSUPPRESSED STATUS: ICD-10-CM

## 2022-03-25 ENCOUNTER — PATIENT OUTREACH (OUTPATIENT)
Dept: ADMINISTRATIVE | Facility: HOSPITAL | Age: 72
End: 2022-03-25
Payer: MEDICARE

## 2022-05-10 ENCOUNTER — LAB VISIT (OUTPATIENT)
Dept: LAB | Facility: OTHER | Age: 72
End: 2022-05-10
Attending: INTERNAL MEDICINE
Payer: MEDICARE

## 2022-05-10 DIAGNOSIS — E05.90 HYPERTHYROIDISM: ICD-10-CM

## 2022-05-10 DIAGNOSIS — E13.9 LATENT AUTOIMMUNE DIABETES IN ADULTS (LADA), MANAGED AS TYPE 1: ICD-10-CM

## 2022-05-10 LAB
ESTIMATED AVG GLUCOSE: 183 MG/DL (ref 68–131)
HBA1C MFR BLD: 8 % (ref 4–5.6)
TSH SERPL DL<=0.005 MIU/L-ACNC: 1.76 UIU/ML (ref 0.4–4)
TSH SERPL DL<=0.005 MIU/L-ACNC: 1.76 UIU/ML (ref 0.4–4)

## 2022-05-10 PROCEDURE — 84443 ASSAY THYROID STIM HORMONE: CPT | Performed by: INTERNAL MEDICINE

## 2022-05-10 PROCEDURE — 36415 COLL VENOUS BLD VENIPUNCTURE: CPT | Performed by: INTERNAL MEDICINE

## 2022-05-10 PROCEDURE — 83036 HEMOGLOBIN GLYCOSYLATED A1C: CPT | Performed by: INTERNAL MEDICINE

## 2022-05-16 ENCOUNTER — PATIENT OUTREACH (OUTPATIENT)
Dept: ADMINISTRATIVE | Facility: OTHER | Age: 72
End: 2022-05-16
Payer: MEDICARE

## 2022-05-16 NOTE — PROGRESS NOTES
Health Maintenance Due   Topic Date Due    COVID-19 Vaccine (4 - Booster for Pfizer series) 02/19/2022     Updates were requested from care everywhere.  Chart was reviewed for overdue Proactive [facility] Encounters (BRENDA) topics (CRS, Breast Cancer Screening, Eye exam)  Health Maintenance has been updated.  LINKS immunization registry triggered.  Immunizations were reconciled.

## 2022-05-30 DIAGNOSIS — E78.5 HYPERLIPIDEMIA, UNSPECIFIED HYPERLIPIDEMIA TYPE: ICD-10-CM

## 2022-05-30 RX ORDER — SIMVASTATIN 20 MG/1
20 TABLET, FILM COATED ORAL DAILY
Qty: 90 TABLET | Refills: 1 | Status: SHIPPED | OUTPATIENT
Start: 2022-05-30 | End: 2022-07-28

## 2022-05-30 NOTE — TELEPHONE ENCOUNTER
Refill Authorization Note   Sarita Alvarez  is requesting a refill authorization.  Brief Assessment and Rationale for Refill:  Approve          Medication Reconciliation Completed: No   Comments:     No Care Gaps recommended.     Note composed:3:03 PM 05/30/2022

## 2022-05-30 NOTE — TELEPHONE ENCOUNTER
Faxed Prescription Request Received from  [facility]       for Simvastatin 20 mg daily. Will send to centralized pool.        LOV  12/02/2021                                            Thank You

## 2022-05-30 NOTE — TELEPHONE ENCOUNTER
No new care gaps identified.  [facility] Embedded Care Gaps. Reference number: [id number]. 5/30/2022   12:29:23 PM CDT

## 2022-06-10 ENCOUNTER — TELEPHONE (OUTPATIENT)
Dept: INTERNAL MEDICINE | Facility: CLINIC | Age: 72
End: 2022-06-10
Payer: MEDICARE

## 2022-06-10 NOTE — TELEPHONE ENCOUNTER
----- Message from Shirley Batista sent at 6/10/2022 11:09 AM CDT -----  Contact: LICHA CHENEY [[id number]]  Type:  RX Refill Request      Who Called:  LICHA CHENEY [[id number]]      Refill or New Rx: refill       RX Name and Strength: insulin degludec (TRESIBA FLEXTOUCH U-100) 100 unit/mL (3 mL) insulin pen      How is the patient currently taking it? (ex. 1XDay): Sig: INJECT 15 UNITS SUBCUTANEOUSLY IN THE EVENING      Is this a 30 day or 90 day RX:      Preferred Pharmacy with phone number: [id number] - [place], [place] - [facility] [address]      Local or Mail Order: Local      Ordering Provider: Ronald Koch MD      Would the patient rather a call back or a response via [facility]? Call back       Best Call Back Number: [phone]    Additional Information:

## 2022-07-13 ENCOUNTER — HOSPITAL ENCOUNTER (EMERGENCY)
Facility: HOSPITAL | Age: 72
Discharge: HOME | End: 2022-07-13
Attending: EMERGENCY MEDICINE
Payer: MEDICARE

## 2022-07-13 VITALS
OXYGEN SATURATION: 98 % | TEMPERATURE: 99 F | HEIGHT: 63 IN | SYSTOLIC BLOOD PRESSURE: 137 MMHG | HEART RATE: 66 BPM | WEIGHT: 115 LBS | RESPIRATION RATE: 18 BRPM | DIASTOLIC BLOOD PRESSURE: 65 MMHG | BODY MASS INDEX: 20.38 KG/M2

## 2022-07-13 DIAGNOSIS — I87.1 NUTCRACKER PHENOMENON OF RENAL VEIN: ICD-10-CM

## 2022-07-13 DIAGNOSIS — R31.9 HEMATURIA, UNSPECIFIED TYPE: ICD-10-CM

## 2022-07-13 DIAGNOSIS — R10.31 RIGHT LOWER QUADRANT ABDOMINAL PAIN: Primary | ICD-10-CM

## 2022-07-13 LAB
ALBUMIN SERPL BCP-MCNC: 4 G/DL (ref 3.5–5.2)
ALP SERPL-CCNC: 65 U/L (ref 55–135)
ALT SERPL W/O P-5'-P-CCNC: 15 U/L (ref 10–44)
ANION GAP SERPL CALC-SCNC: 10 MMOL/L (ref 8–16)
AST SERPL-CCNC: 16 U/L (ref 10–40)
BASOPHILS # BLD AUTO: 0.05 K/UL (ref 0–0.2)
BASOPHILS NFR BLD: 0.6 % (ref 0–1.9)
BILIRUB SERPL-MCNC: 0.5 MG/DL (ref 0.1–1)
BILIRUB UR QL STRIP: NEGATIVE
BUN SERPL-MCNC: 18 MG/DL (ref 6–30)
BUN SERPL-MCNC: 18 MG/DL (ref 8–23)
CALCIUM SERPL-MCNC: 9.8 MG/DL (ref 8.7–10.5)
CHLORIDE SERPL-SCNC: 102 MMOL/L (ref 95–110)
CHLORIDE SERPL-SCNC: 103 MMOL/L (ref 95–110)
CLARITY UR REFRACT.AUTO: CLEAR
CO2 SERPL-SCNC: 26 MMOL/L (ref 23–29)
COLOR UR AUTO: YELLOW
CREAT SERPL-MCNC: 0.8 MG/DL (ref 0.5–1.4)
CREAT SERPL-MCNC: 0.9 MG/DL (ref 0.5–1.4)
DIFFERENTIAL METHOD: ABNORMAL
EOSINOPHIL # BLD AUTO: 0.2 K/UL (ref 0–0.5)
EOSINOPHIL NFR BLD: 2.1 % (ref 0–8)
ERYTHROCYTE [DISTWIDTH] IN BLOOD BY AUTOMATED COUNT: 12.4 % (ref 11.5–14.5)
EST. GFR  (AFRICAN AMERICAN): >60 ML/MIN/1.73 M^2
EST. GFR  (NON AFRICAN AMERICAN): >60 ML/MIN/1.73 M^2
GLUCOSE SERPL-MCNC: 212 MG/DL (ref 70–110)
GLUCOSE SERPL-MCNC: 213 MG/DL (ref 70–110)
GLUCOSE UR QL STRIP: NEGATIVE
HCT VFR BLD AUTO: 42.6 % (ref 37–48.5)
HCT VFR BLD CALC: 40 %PCV (ref 36–54)
HCV AB SERPL QL IA: NEGATIVE
HGB BLD-MCNC: 13.7 G/DL (ref 12–16)
HGB UR QL STRIP: ABNORMAL
HYALINE CASTS UR QL AUTO: 1 /LPF
IMM GRANULOCYTES # BLD AUTO: 0.01 K/UL (ref 0–0.04)
IMM GRANULOCYTES NFR BLD AUTO: 0.1 % (ref 0–0.5)
KETONES UR QL STRIP: NEGATIVE
LEUKOCYTE ESTERASE UR QL STRIP: NEGATIVE
LIPASE SERPL-CCNC: 18 U/L (ref 4–60)
LYMPHOCYTES # BLD AUTO: 1.3 K/UL (ref 1–4.8)
LYMPHOCYTES NFR BLD: 16.5 % (ref 18–48)
MCH RBC QN AUTO: 27.6 PG (ref 27–31)
MCHC RBC AUTO-ENTMCNC: 32.2 G/DL (ref 32–36)
MCV RBC AUTO: 86 FL (ref 82–98)
MICROSCOPIC COMMENT: ABNORMAL
MONOCYTES # BLD AUTO: 0.5 K/UL (ref 0.3–1)
MONOCYTES NFR BLD: 6.7 % (ref 4–15)
NEUTROPHILS # BLD AUTO: 6 K/UL (ref 1.8–7.7)
NEUTROPHILS NFR BLD: 74 % (ref 38–73)
NITRITE UR QL STRIP: NEGATIVE
NRBC BLD-RTO: 0 /100 WBC
PH UR STRIP: 6 [PH] (ref 5–8)
PLATELET # BLD AUTO: 242 K/UL (ref 150–450)
PMV BLD AUTO: 10 FL (ref 9.2–12.9)
POC IONIZED CALCIUM: 1.17 MMOL/L (ref 1.06–1.42)
POC TCO2 (MEASURED): 26 MMOL/L (ref 23–29)
POTASSIUM BLD-SCNC: 3.5 MMOL/L (ref 3.5–5.1)
POTASSIUM SERPL-SCNC: 3.8 MMOL/L (ref 3.5–5.1)
PROT SERPL-MCNC: 7.3 G/DL (ref 6–8.4)
PROT UR QL STRIP: NEGATIVE
RBC # BLD AUTO: 4.96 M/UL (ref 4–5.4)
RBC #/AREA URNS AUTO: 12 /HPF (ref 0–4)
SAMPLE: ABNORMAL
SODIUM BLD-SCNC: 139 MMOL/L (ref 136–145)
SODIUM SERPL-SCNC: 138 MMOL/L (ref 136–145)
SP GR UR STRIP: 1.02 (ref 1–1.03)
SQUAMOUS #/AREA URNS AUTO: 0 /HPF
URN SPEC COLLECT METH UR: ABNORMAL
WBC # BLD AUTO: 8.05 K/UL (ref 3.9–12.7)
WBC #/AREA URNS AUTO: 1 /HPF (ref 0–5)

## 2022-07-13 PROCEDURE — 81001 URINALYSIS AUTO W/SCOPE: CPT | Performed by: EMERGENCY MEDICINE

## 2022-07-13 PROCEDURE — 99285 EMERGENCY DEPT VISIT HI MDM: CPT | Mod: 25

## 2022-07-13 PROCEDURE — 25500020 PHARM REV CODE 255: Performed by: EMERGENCY MEDICINE

## 2022-07-13 PROCEDURE — 99284 EMERGENCY DEPT VISIT MOD MDM: CPT | Mod: ,,, | Performed by: EMERGENCY MEDICINE

## 2022-07-13 PROCEDURE — 99284 PR EMERGENCY DEPT VISIT,LEVEL IV: ICD-10-PCS | Mod: ,,, | Performed by: EMERGENCY MEDICINE

## 2022-07-13 PROCEDURE — 80053 COMPREHEN METABOLIC PANEL: CPT | Performed by: EMERGENCY MEDICINE

## 2022-07-13 PROCEDURE — 86803 HEPATITIS C AB TEST: CPT | Performed by: PHYSICIAN ASSISTANT

## 2022-07-13 PROCEDURE — 80047 BASIC METABLC PNL IONIZED CA: CPT

## 2022-07-13 PROCEDURE — 85025 COMPLETE CBC W/AUTO DIFF WBC: CPT | Performed by: EMERGENCY MEDICINE

## 2022-07-13 PROCEDURE — 82330 ASSAY OF CALCIUM: CPT

## 2022-07-13 PROCEDURE — 83690 ASSAY OF LIPASE: CPT | Performed by: EMERGENCY MEDICINE

## 2022-07-13 RX ADMIN — IOHEXOL 100 ML: 350 INJECTION, SOLUTION INTRAVENOUS at 01:07

## 2022-07-13 NOTE — ED NOTES
"The patient came to the er today with c/o generalized right sided abd pain that began after eating lima beans. Pain is not reproducible to palpation. The patient denies nausea or vomiting. Pt did not check her temperature at home. When she moves "a little bit she can feel it". Pain is intermittent. Concerned is concerned the pain may a gas pocket, a pulled muscle, or appendicitis.   "

## 2022-07-13 NOTE — ED NOTES
I-STAT Chem-8+ Results:    Value Reference Range   Sodium 139 136-145 mmol/L   Potassium  3.5 3.5-5.1 mmol/L   Chloride 103  mmol/L   Ionized Calcium 1.17 1.06-1.42 mmol/L   CO2 (measured) 26 23-29 mmol/L   Glucose 213  mg/dL   BUN 18 6-30 mg/dL   Creatinine 0.8 0.5-1.4 mg/dL   Hematocrit 40 36-54%

## 2022-07-13 NOTE — ED PROVIDER NOTES
Encounter Date: 7/13/2022    SCRIBE #1 NOTE: IMarissa, ingrid scribing for, and in the presence of,  Maren Arnold MD. I have scribed the following portions of the note - Other sections scribed: HPI, ROS, PE.       History     Chief Complaint   Patient presents with    Abdominal Pain     R lower abd     Time patient was seen by the provider: 1:01 PM      The patient is a 72 y.o. female with PMHx including: DM type I, on lispro, HTN, HLD, SVT, thyroid disease who presents to the ED with a complaint of intermittent RLQ pain with onset last night. She noticed pain and and bloating after eating lima beans. She is able to pass gas. Denies dysuria, hematuria, nausea, vomiting, change in appetite, history of abdominal surgeries, and history of kidney stones.    The history is provided by the patient and medical records. No  was used.     Review of patient's allergies indicates:   Allergen Reactions    Actonel [risedronate]      Other reaction(s): chest pain    Codeine Rash and Itching     Other reaction(s): Itching  Other reaction(s): Hives     Past Medical History:   Diagnosis Date    Cataract     Diabetes mellitus type I     Diabetic peripheral neuropathy 7/9/2012    HTN (hypertension) 7/9/2012    Hyperlipidemia 7/9/2012    Osteoporosis, post-menopausal 7/9/2012    Poorly controlled type 1 diabetes mellitus with peripheral neuropathy 7/9/2012    Supraventricular tachycardia 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    SVT (supraventricular tachycardia) 7/9/2012    Thyroid disease     hyperthyroidism    Venous insufficiency (chronic) (peripheral) 7/9/2012     Past Surgical History:   Procedure Laterality Date    BREAST BIOPSY Left 12/23/2016    core    COLONOSCOPY  11/28/2016    DILATION AND CURETTAGE OF UTERUS      RADIOFREQUENCY ABLATION  6/17/14    AVNRT    WISDOM TOOTH EXTRACTION       Family History   Problem Relation Age of Onset    Heart disease Mother         valvular heart disease  at 91    Hypertension Mother     Cataracts Mother     COPD Father     Glaucoma Father     Hypertension Sister     Glaucoma Sister     Allergies Sister     No Known Problems Brother     Hypertension Daughter     Fibroids Daughter     Hypertension Daughter     No Known Problems Daughter     Diabetes Maternal Aunt     Cataracts Maternal Aunt     No Known Problems Maternal Uncle     No Known Problems Paternal Aunt     No Known Problems Paternal Uncle     No Known Problems Maternal Grandmother     No Known Problems Maternal Grandfather     No Known Problems Paternal Grandmother     No Known Problems Paternal Grandfather     Amblyopia Neg Hx     Blindness Neg Hx     Cancer Neg Hx     Macular degeneration Neg Hx     Retinal detachment Neg Hx     Strabismus Neg Hx     Stroke Neg Hx     Thyroid disease Neg Hx     Ovarian cancer Neg Hx     Colon cancer Neg Hx     Breast cancer Neg Hx      Social History     Tobacco Use    Smoking status: Never Smoker    Smokeless tobacco: Never Used   Substance Use Topics    Alcohol use: No    Drug use: No     Review of Systems   Constitutional: Negative for appetite change and fever.   HENT: Negative for sore throat.    Eyes: Negative for visual disturbance.   Respiratory: Negative for shortness of breath.    Cardiovascular: Negative for chest pain.   Gastrointestinal: Positive for abdominal pain (RLQ). Negative for nausea and vomiting.   Genitourinary: Negative for dysuria.   Musculoskeletal: Negative for back pain.   Skin: Negative for rash.   Neurological: Negative for weakness.       Physical Exam     Initial Vitals [07/13/22 1138]   BP Pulse Resp Temp SpO2   137/65 66 18 99.3 °F (37.4 °C) 98 %      MAP       --         Physical Exam    Nursing note and vitals reviewed.  Constitutional: She appears well-developed and well-nourished. She is not diaphoretic. No distress.   HENT:   Head: Normocephalic and atraumatic.   Eyes: EOM are normal.   Neck: Neck  supple.   Normal range of motion.  Cardiovascular: Normal rate and regular rhythm.   No leg swelling   Pulmonary/Chest: No respiratory distress.   Abdominal: Abdomen is soft. There is no abdominal tenderness. There is no rebound and no guarding.   Musculoskeletal:         General: Normal range of motion.      Cervical back: Normal range of motion and neck supple.     Neurological: She is alert and oriented to person, place, and time. GCS score is 15. GCS eye subscore is 4. GCS verbal subscore is 5. GCS motor subscore is 6.   Skin: Skin is warm and dry.   Psychiatric: She has a normal mood and affect. Her behavior is normal. Judgment and thought content normal.         ED Course   Procedures  Labs Reviewed   CBC W/ AUTO DIFFERENTIAL - Abnormal; Notable for the following components:       Result Value    Gran % 74.0 (*)     Lymph % 16.5 (*)     All other components within normal limits    Narrative:     Release to patient->Immediate   COMPREHENSIVE METABOLIC PANEL - Abnormal; Notable for the following components:    Glucose 212 (*)     All other components within normal limits    Narrative:     Release to patient->Immediate   URINALYSIS, REFLEX TO URINE CULTURE - Abnormal; Notable for the following components:    Occult Blood UA 1+ (*)     All other components within normal limits    Narrative:     Specimen Source->Urine   URINALYSIS MICROSCOPIC - Abnormal; Notable for the following components:    RBC, UA 12 (*)     All other components within normal limits    Narrative:     Specimen Source->Urine   ISTAT PROCEDURE - Abnormal; Notable for the following components:    POC Glucose 213 (*)     All other components within normal limits   HEPATITIS C ANTIBODY    Narrative:     Release to patient->Immediate   LIPASE    Narrative:     Release to patient->Immediate          Imaging Results          CT Abdomen Pelvis With Contrast (Final result)  Result time 07/13/22 14:46:11    Final result by Jong Boone Jr., MD  (07/13/22 14:46:11)                 Impression:      1. No acute findings in the abdomen or pelvis.  2. Compression of the left renal vein by the SMA, dilation of the left ovarian vein, and left adnexal varices.  Constellation of findings consistent with nutcracker phenomenon.  Correlate with history of left flank pain, pelvic pain, or hematuria.  3. Suspected uterine fibroids.    Electronically signed by resident: Mansoor Banks  Date:    07/13/2022  Time:    14:08    Electronically signed by: Jong Boone MD  Date:    07/13/2022  Time:    14:46             Narrative:    EXAMINATION:  CT ABDOMEN PELVIS WITH CONTRAST    CLINICAL HISTORY:  RLQ abdominal pain (Age >= 14y);    TECHNIQUE:  Axial images of the abdomen and pelvis were acquired  after the use of 100 cc [id number] IV contrast.  Coronal and sagittal reconstructions were also obtained    COMPARISON:  CT renal stone 10/04/2011    FINDINGS:  Heart: Normal in size. No pericardial effusion.    Lungs: Visualized portions of the lungs are clear without consolidation.  Few bands of subsegmental atelectasis in the left lower lobe.  No pleural fluid or pneumothorax.    Liver: Normal in size and contour.  No focal hepatic lesion.    Gallbladder: No calcified gallstones.    Bile Ducts: No evidence of dilated ducts.    Pancreas: No mass or peripancreatic fat stranding.  Mild prominence of the pancreatic duct with normal tapering at the ampulla.    Spleen: Unremarkable.    Stomach and duodenum: Unremarkable.    Adrenals: Unremarkable.    Kidneys/ Ureters: Normal in size and location. Normal enhancement. No hydronephrosis or nephrolithiasis. No ureteral dilatation.  Bilateral hypodensities too small to characterize.    Bladder: No evidence of wall thickening.    Reproductive organs: Few uterine calcifications, possible fibroids.    Bowel/Mesentery: Small bowel is normal in caliber with no evidence of obstruction. No evidence of inflammation or wall thickening.  Appendix is  visualized and appears normal (marked with arrows on coronal series 601, image 73).  Colon demonstrates no focal wall thickening.    Peritoneum: No intraperitoneal free air or fluid    Lymph nodes: No retroperitoneal lymphadenopathy.    Vasculature: No aneurysm. Mild scattered calcific atherosclerosis.  Compression of the left renal vein by the SMA (axial series 2, image 36) with reduced aortic-SMA angle (sagittal series 602, image 89).  There is associated dilation of the left ovarian vein (axial series 2, image 55) and left adnexal varices (axial series 2, image 118).    Abdominal wall:  Unremarkable.    Bones: No acute fracture. No suspicious osseous lesions.                                 Medications   iohexoL (OMNIPAQUE 350) injection 100 mL (100 mLs Intravenous Given 7/13/22 1354)     Medical Decision Making:   History:   Old Medical Records: I decided to obtain old medical records.  Old Records Summarized: records from previous admission(s) and records from clinic visits.  Differential Diagnosis:   Appendicitis, cholelithiasis, colitis, SBO, UTI, renal stone, pyelonephritis, AAA, aortic dissction    Clinical Tests:   Lab Tests: Ordered and Reviewed  Radiological Study: Ordered and Reviewed  ED Management:  Pt with unremarkable work up, belly is benign  Think likely gas pains  Did have incidental finding of nutcracker syndrome, update patient, referral to vascular placed  Discharged to home in stable condition, return to ED warnings given, follow up and patient care instructions given.            Scribe Attestation:   Scribe #1: I performed the above scribed service and the documentation accurately describes the services I performed. I attest to the accuracy of the note.                 Clinical Impression:   Final diagnoses:  [I87.1] Nutcracker phenomenon of renal vein  [R31.9] Hematuria, unspecified type  [R10.31] Right lower quadrant abdominal pain (Primary)          ED Disposition Condition    Discharge  Stable        ED Prescriptions     None        Follow-up Information    None          Maren Arnold MD  07/15/22 2100

## 2022-07-15 ENCOUNTER — TELEPHONE (OUTPATIENT)
Dept: INTERNAL MEDICINE | Facility: CLINIC | Age: 72
End: 2022-07-15
Payer: MEDICARE

## 2022-07-15 NOTE — TELEPHONE ENCOUNTER
----- Message from Genesis Baez sent at 7/15/2022 10:41 AM CDT -----  Name of Who is Calling: LICHA CHENEY [[id number]]           What is the request in detail: Patient is requesting a call back.  Patient was seen in the ER and would like to speak with Dr. Ford. Please assist.           Can the clinic reply by [facility]: No           What Number to Call Back if not in [facility]: [phone]

## 2022-07-15 NOTE — TELEPHONE ENCOUNTER
Patient was seen in the ER for hospital visit on 7/13/22. She was calling to scheduled an hospital follow up. Patient declined next available clinic hospital follow up with Dr. Zhu. Patient is only requesting to be seen by PCP Dr. Ford. Patient was informed that we typically we like to see patients for hospital follow up no more than a week after. Patient was scheduled for 8/23/22  With Dr. Ford and added to wait list for sooner appointment per request. Thanks.

## 2022-08-18 DIAGNOSIS — E11.9 TYPE 2 DIABETES MELLITUS WITHOUT COMPLICATION: ICD-10-CM

## 2022-08-22 NOTE — PROGRESS NOTES
Subjective:       Patient ID: Sarita Alvarez is a 72 y.o. female.    Chief Complaint: Hypertension    Pt here for f/u. She was in ED 5 wks ago for intermittent RLQ pain. Record reviewed. No clear etiology was found but felt symptoms may be due to gas pains. She had incidental finding on CT of nutcracker phenomenon (compression of L renal vein by proximal SMA and aorta with associated L ovarian vein dilatation and adnexal varices. She is presumably asymptomatic as she denies symptoms on that side. She does have microhematuria but not clear if this is related. She was referred to vascular but has not completed this yet.      Pt has DM1 and is followed by endocrinology. This is not well controlled as last A1c was 8.0. She is on Tresiba 15 units and novolog SSI per carb counting (usually 3-5 units). She continues to self-adjust insulin. Her fasting sugars are 130s-140s with some excursions higher and prandial sugars are 140s-160s with some excursions higher. She has occasional low in 60s which she attributes to doing too much physical activity at the time; she is able to recognize and correct. She has complications of diabetes that includes neuropathy in feet. This is tolerable without meds. Other health maint is up to date.    She also sees endocrinology for hyperthyroidism/hashimotos. She is on tapazole and monitored regularly by endocrinology; however, she is overdue for f/u. She is clinically euthyroid.     She has varicose veins that cause some swelling at times but manages with compression hose. Has seen vascular in past.     Pt's BP is well controlled. Tolerating meds well. Pt denies cp/sob/ha/vision or neuro changes. Checking at home and is well controlled.     HLD is tx with simvastatin which she tolerates well. She has aortic atherosclerosis and is on appropriate risk mitigation tx.    Pt has osteoporosis but most recent DEXA 6/2020 showed osteopenia. She has not tolerated actonel due to CP in past,  presumably esophagitis. She is not willing to try another bisphosphonate either oral or IV. We discussed prolia but she declines. She understands r/b of this. She is engaged in resistance exercises.      Pt had symptomatic SVT s/p ablation and has not had recurrence.      Diabetes  Pertinent negatives for hypoglycemia include no dizziness or headaches. Pertinent negatives for diabetes include no chest pain and no polyuria.   Medication Refill  Pertinent negatives include no chest pain or headaches.   Hypertension  Pertinent negatives include no chest pain, headaches or shortness of breath.     Review of Systems   Constitutional: Negative for unexpected weight change.   Eyes: Negative for visual disturbance.   Respiratory: Negative for shortness of breath.    Cardiovascular: Negative for chest pain.   Gastrointestinal: Negative for blood in stool.   Endocrine: Negative for polyuria.   Genitourinary: Negative for frequency.   Neurological: Negative for dizziness, light-headedness and headaches.   Psychiatric/Behavioral: Negative for dysphoric mood.       Objective:          Assessment:       1. 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 Essential hypertension    3. Mixed hyperlipidemia    4. Osteoporosis, post-menopausal    5. Hashimoto's disease    6. Hyperthyroidism    7. Diabetic peripheral neuropathy associated with type 1 diabetes mellitus    8. Chronic right shoulder pain    9. Aortic atherosclerosis    10. Supraventricular tachycardia    11. Venous insufficiency (chronic) (peripheral)    12. Microhematuria    13. Encounter for screening mammogram for breast cancer        Plan:       1. Recent labs reviewed     2. Keep f/u with specialists  3. Home BP and BS monitoring; reviewed hypoglycemia plan with pt  4. F/u vascular as referred  5. Urology referral re: hematuria  6. mammogram    Physical Exam  Constitutional:       Appearance: Normal appearance. She is well-developed.   HENT:       Head: Normocephalic and atraumatic.      Right Ear: Hearing, tympanic membrane and ear canal normal.      Left Ear: Hearing, tympanic membrane and ear canal normal.      Mouth/Throat:      Pharynx: No oropharyngeal exudate or posterior oropharyngeal erythema.   Eyes:      Extraocular Movements: Extraocular movements intact.      Pupils: Pupils are equal, round, and reactive to light.   Neck:      Thyroid: No thyroid mass or thyromegaly.      Vascular: No carotid bruit.   Cardiovascular:      Rate and Rhythm: Normal rate and regular rhythm.      Pulses:           Radial pulses are 2+ on the right side and 2+ on the left side.        Posterior tibial pulses are 2+ on the right side and 2+ on the left side.      Heart sounds: Normal heart sounds, S1 normal and S2 normal. No murmur heard.  Pulmonary:      Effort: Pulmonary effort is normal.      Breath sounds: Normal breath sounds. No wheezing.   Abdominal:      General: Bowel sounds are normal. There is no distension.      Palpations: Abdomen is soft. There is no mass.      Tenderness: There is no abdominal tenderness.   Musculoskeletal:      Cervical back: Neck supple.      Right lower leg: No edema.      Left lower leg: No edema.      Right foot: Normal. No deformity.      Left foot: Normal. No deformity.   Feet:      Right foot:      Protective Sensation: 6 sites tested. 6 sites sensed.      Skin integrity: No ulcer, blister or skin breakdown.      Left foot:      Protective Sensation: 6 sites tested. 6 sites sensed.      Skin integrity: No ulcer, blister or skin breakdown.   Lymphadenopathy:      Cervical: No cervical adenopathy.   Neurological:      Mental Status: She is alert and oriented to person, place, and time.      Cranial Nerves: No cranial nerve deficit.      Sensory: No sensory deficit.      Coordination: Coordination normal.      Gait: Gait normal.   Psychiatric:         Mood and Affect: Mood normal.         Behavior: Behavior normal.

## 2022-08-23 ENCOUNTER — LAB VISIT (OUTPATIENT)
Dept: LAB | Facility: OTHER | Age: 72
End: 2022-08-23
Attending: INTERNAL MEDICINE
Payer: MEDICARE

## 2022-08-23 ENCOUNTER — OFFICE VISIT (OUTPATIENT)
Dept: INTERNAL MEDICINE | Facility: CLINIC | Age: 72
End: 2022-08-23
Payer: MEDICARE

## 2022-08-23 VITALS
HEART RATE: 53 BPM | WEIGHT: 114 LBS | SYSTOLIC BLOOD PRESSURE: 136 MMHG | BODY MASS INDEX: 20.2 KG/M2 | OXYGEN SATURATION: 100 % | HEIGHT: 63 IN | DIASTOLIC BLOOD PRESSURE: 64 MMHG

## 2022-08-23 DIAGNOSIS — Z12.31 ENCOUNTER FOR SCREENING MAMMOGRAM FOR BREAST CANCER: ICD-10-CM

## 2022-08-23 DIAGNOSIS — R31.29 MICROHEMATURIA: ICD-10-CM

## 2022-08-23 DIAGNOSIS — I10 ESSENTIAL HYPERTENSION: ICD-10-CM

## 2022-08-23 DIAGNOSIS — E10.42 DIABETIC PERIPHERAL NEUROPATHY ASSOCIATED WITH TYPE 1 DIABETES MELLITUS: ICD-10-CM

## 2022-08-23 DIAGNOSIS — I87.2 VENOUS INSUFFICIENCY (CHRONIC) (PERIPHERAL): ICD-10-CM

## 2022-08-23 DIAGNOSIS — E06.3 HASHIMOTO'S DISEASE: ICD-10-CM

## 2022-08-23 DIAGNOSIS — I47.10 SUPRAVENTRICULAR TACHYCARDIA: ICD-10-CM

## 2022-08-23 DIAGNOSIS — M81.0 OSTEOPOROSIS, POST-MENOPAUSAL: ICD-10-CM

## 2022-08-23 DIAGNOSIS — M25.511 CHRONIC RIGHT SHOULDER PAIN: ICD-10-CM

## 2022-08-23 DIAGNOSIS — G89.29 CHRONIC RIGHT SHOULDER PAIN: ICD-10-CM

## 2022-08-23 DIAGNOSIS — I70.0 AORTIC ATHEROSCLEROSIS: ICD-10-CM

## 2022-08-23 DIAGNOSIS — E11.9 TYPE 2 DIABETES MELLITUS WITHOUT COMPLICATION: ICD-10-CM

## 2022-08-23 DIAGNOSIS — E78.2 MIXED HYPERLIPIDEMIA: ICD-10-CM

## 2022-08-23 DIAGNOSIS — E13.9 LATENT AUTOIMMUNE DIABETES IN ADULTS (LADA), MANAGED AS TYPE 1: Primary | ICD-10-CM

## 2022-08-23 DIAGNOSIS — E05.90 HYPERTHYROIDISM: ICD-10-CM

## 2022-08-23 LAB
ESTIMATED AVG GLUCOSE: 174 MG/DL (ref 68–131)
HBA1C MFR BLD: 7.7 % (ref 4–5.6)

## 2022-08-23 PROCEDURE — 99999 PR PBB SHADOW E&M-EST. PATIENT-LVL V: ICD-10-PCS | Mod: PBBFAC,,, | Performed by: INTERNAL MEDICINE

## 2022-08-23 PROCEDURE — 83036 HEMOGLOBIN GLYCOSYLATED A1C: CPT | Performed by: INTERNAL MEDICINE

## 2022-08-23 PROCEDURE — 1101F PR PT FALLS ASSESS DOC 0-1 FALLS W/OUT INJ PAST YR: ICD-10-PCS | Mod: CPTII,S$GLB,, | Performed by: INTERNAL MEDICINE

## 2022-08-23 PROCEDURE — 3008F PR BODY MASS INDEX (BMI) DOCUMENTED: ICD-10-PCS | Mod: CPTII,S$GLB,, | Performed by: INTERNAL MEDICINE

## 2022-08-23 PROCEDURE — 99214 OFFICE O/P EST MOD 30 MIN: CPT | Mod: S$GLB,,, | Performed by: INTERNAL MEDICINE

## 2022-08-23 PROCEDURE — 1126F PR PAIN SEVERITY QUANTIFIED, NO PAIN PRESENT: ICD-10-PCS | Mod: CPTII,S$GLB,, | Performed by: INTERNAL MEDICINE

## 2022-08-23 PROCEDURE — 3078F DIAST BP <80 MM HG: CPT | Mod: CPTII,S$GLB,, | Performed by: INTERNAL MEDICINE

## 2022-08-23 PROCEDURE — 1126F AMNT PAIN NOTED NONE PRSNT: CPT | Mod: CPTII,S$GLB,, | Performed by: INTERNAL MEDICINE

## 2022-08-23 PROCEDURE — 3052F HG A1C>EQUAL 8.0%<EQUAL 9.0%: CPT | Mod: CPTII,S$GLB,, | Performed by: INTERNAL MEDICINE

## 2022-08-23 PROCEDURE — 3288F FALL RISK ASSESSMENT DOCD: CPT | Mod: CPTII,S$GLB,, | Performed by: INTERNAL MEDICINE

## 2022-08-23 PROCEDURE — 36415 COLL VENOUS BLD VENIPUNCTURE: CPT | Performed by: INTERNAL MEDICINE

## 2022-08-23 PROCEDURE — 3072F LOW RISK FOR RETINOPATHY: CPT | Mod: CPTII,S$GLB,, | Performed by: INTERNAL MEDICINE

## 2022-08-23 PROCEDURE — 4010F PR ACE/ARB THEARPY RXD/TAKEN: ICD-10-PCS | Mod: CPTII,S$GLB,, | Performed by: INTERNAL MEDICINE

## 2022-08-23 PROCEDURE — 99999 PR PBB SHADOW E&M-EST. PATIENT-LVL V: CPT | Mod: PBBFAC,,, | Performed by: INTERNAL MEDICINE

## 2022-08-23 PROCEDURE — 1160F PR REVIEW ALL MEDS BY PRESCRIBER/CLIN PHARMACIST DOCUMENTED: ICD-10-PCS | Mod: CPTII,S$GLB,, | Performed by: INTERNAL MEDICINE

## 2022-08-23 PROCEDURE — 1101F PT FALLS ASSESS-DOCD LE1/YR: CPT | Mod: CPTII,S$GLB,, | Performed by: INTERNAL MEDICINE

## 2022-08-23 PROCEDURE — 3052F PR MOST RECENT HEMOGLOBIN A1C LEVEL 8.0 - < 9.0%: ICD-10-PCS | Mod: CPTII,S$GLB,, | Performed by: INTERNAL MEDICINE

## 2022-08-23 PROCEDURE — 3078F PR MOST RECENT DIASTOLIC BLOOD PRESSURE < 80 MM HG: ICD-10-PCS | Mod: CPTII,S$GLB,, | Performed by: INTERNAL MEDICINE

## 2022-08-23 PROCEDURE — 3008F BODY MASS INDEX DOCD: CPT | Mod: CPTII,S$GLB,, | Performed by: INTERNAL MEDICINE

## 2022-08-23 PROCEDURE — 1159F MED LIST DOCD IN RCRD: CPT | Mod: CPTII,S$GLB,, | Performed by: INTERNAL MEDICINE

## 2022-08-23 PROCEDURE — 99214 PR OFFICE/OUTPT VISIT, EST, LEVL IV, 30-39 MIN: ICD-10-PCS | Mod: S$GLB,,, | Performed by: INTERNAL MEDICINE

## 2022-08-23 PROCEDURE — 4010F ACE/ARB THERAPY RXD/TAKEN: CPT | Mod: CPTII,S$GLB,, | Performed by: INTERNAL MEDICINE

## 2022-08-23 PROCEDURE — 3072F PR LOW RISK FOR RETINOPATHY: ICD-10-PCS | Mod: CPTII,S$GLB,, | Performed by: INTERNAL MEDICINE

## 2022-08-23 PROCEDURE — 1160F RVW MEDS BY RX/DR IN RCRD: CPT | Mod: CPTII,S$GLB,, | Performed by: INTERNAL MEDICINE

## 2022-08-23 PROCEDURE — 3075F SYST BP GE 130 - 139MM HG: CPT | Mod: CPTII,S$GLB,, | Performed by: INTERNAL MEDICINE

## 2022-08-23 PROCEDURE — 3075F PR MOST RECENT SYSTOLIC BLOOD PRESS GE 130-139MM HG: ICD-10-PCS | Mod: CPTII,S$GLB,, | Performed by: INTERNAL MEDICINE

## 2022-08-23 PROCEDURE — 1159F PR MEDICATION LIST DOCUMENTED IN MEDICAL RECORD: ICD-10-PCS | Mod: CPTII,S$GLB,, | Performed by: INTERNAL MEDICINE

## 2022-08-23 PROCEDURE — 3288F PR FALLS RISK ASSESSMENT DOCUMENTED: ICD-10-PCS | Mod: CPTII,S$GLB,, | Performed by: INTERNAL MEDICINE

## 2022-09-08 NOTE — PROGRESS NOTES
"Subjective:      Sarita Alvarez is a 72 y.o. female who was referred by Dr. Ford for evaluation of her microhematuria.      Hematuria  Patient complains of microscopic hematuria. Onset of hematuria was several years ago and was sudden in onset. There is not a history of nephrolithiasis. There is not a history of urologic trauma. Other urologic symptoms include none. Patient admits to history of no risk factors for cancer. Patient denies history of Agent Orange exposure, chronic Rascon catheter,  surgeries, occupational exposure, sexually transmitted diseases, tobacco use, trauma, and urolithiasis. Prior workup has been UA'S and CT.    Component      Latest Ref Rng & Units 7/13/2022   RBC, UA      0 - 4 /hpf 12 (H)   WBC, UA      0 - 5 /hpf 1   Squam Epithel, UA      /hpf 0   Hyaline Casts, UA      0-1/lpf /lpf 1   Microscopic Comment SEE COMMENT     CT abdomen/pelvis (7/13/22) - "Kidneys/ Ureters: Normal in size and location. Normal enhancement. No hydronephrosis or nephrolithiasis. No ureteral dilatation.  Bilateral hypodensities too small to characterize.  Bladder: No evidence of wall thickening.     Vasculature: No aneurysm. Mild scattered calcific atherosclerosis.  Compression of the left renal vein by the SMA (axial series 2, image 36) with reduced aortic-SMA angle (sagittal series 602, image 89).  There is associated dilation of the left ovarian vein (axial series 2, image 55) and left adnexal varices (axial series 2, image 118)."    She denies flank pain and bothersome urinary symptoms. Denies a history of recurrent UTIs and nephrolithiasis. Denies gross hematuria.     The following portions of the patient's history were reviewed and updated as appropriate: allergies, current medications, past family history, past medical history, past social history, past surgical history and problem list.    Review of Systems  Constitutional: no fever or chills  ENT: no nasal congestion or sore throat  Respiratory: " no cough or shortness of breath  Cardiovascular: no chest pain or palpitations  Gastrointestinal: no nausea or vomiting, tolerating diet  Genitourinary: as per HPI  Hematologic/Lymphatic: no easy bruising or lymphadenopathy  Musculoskeletal: no arthralgias or myalgias  Neurological: no seizures or tremors  Behavioral/Psych: no auditory or visual hallucinations     Objective:   Vital Signs:  Vitals:    09/09/22 0924   BP: 136/63   Pulse: (!) 55     Physical Exam   General: alert and oriented, no acute distress  Head: normocephalic, atraumatic  Neck: supple, normal ROM  Respiratory: Symmetric expansion, non-labored breathing  Cardiovascular: regular rate and rhythm  Abdomen: soft, non tender, non distended  Pelvic: deferred  Skin: normal coloration and turgor, no rashes, no suspicious skin lesions noted  Neuro: alert and oriented x3, no gross deficits  Psych: normal judgment and insight, normal mood/affect, and non-anxious    Lab Review   Urinalysis demonstrates positive for red blood cells  Lab Results   Component Value Date    WBC 8.05 07/13/2022    HGB 13.7 07/13/2022    HCT 40 07/13/2022    HCT 42.6 07/13/2022    MCV 86 07/13/2022     07/13/2022     Lab Results   Component Value Date    CREATININE 0.9 07/13/2022    BUN 18 07/13/2022       Imaging   None    Assessment:   Microhematuria     Plan:   Sarita was seen today for new patient.    Diagnoses and all orders for this visit:    Microhematuria  -     Ambulatory referral/consult to Urology  -     Urine culture  -     Urinalysis Microscopic  -     Cystoscopy; Future    Plan:  -- Discussed differential diagnosis for hematuria, including infection, nephrolithiasis, and neoplasms of kidney, ureter, or bladder.  -- Recommended proceeding with full hematuria workup, to include CT urogram and cystoscopy  -- Will send urine for culture  -- Repeat micro UA today for confirmation   -- CT urogram next available (done)  -- Cystoscopy in clinic after CT

## 2022-09-09 ENCOUNTER — OFFICE VISIT (OUTPATIENT)
Dept: UROLOGY | Facility: CLINIC | Age: 72
End: 2022-09-09
Payer: MEDICARE

## 2022-09-09 VITALS
SYSTOLIC BLOOD PRESSURE: 136 MMHG | BODY MASS INDEX: 20.02 KG/M2 | DIASTOLIC BLOOD PRESSURE: 63 MMHG | WEIGHT: 113 LBS | HEIGHT: 63 IN | HEART RATE: 55 BPM

## 2022-09-09 DIAGNOSIS — R31.29 MICROHEMATURIA: ICD-10-CM

## 2022-09-09 LAB
MICROSCOPIC COMMENT: ABNORMAL
RBC #/AREA URNS AUTO: 6 /HPF (ref 0–4)
WBC #/AREA URNS AUTO: 1 /HPF (ref 0–5)

## 2022-09-09 PROCEDURE — 1160F RVW MEDS BY RX/DR IN RCRD: CPT | Mod: CPTII,S$GLB,, | Performed by: NURSE PRACTITIONER

## 2022-09-09 PROCEDURE — 4010F PR ACE/ARB THEARPY RXD/TAKEN: ICD-10-PCS | Mod: CPTII,S$GLB,, | Performed by: NURSE PRACTITIONER

## 2022-09-09 PROCEDURE — 4010F ACE/ARB THERAPY RXD/TAKEN: CPT | Mod: CPTII,S$GLB,, | Performed by: NURSE PRACTITIONER

## 2022-09-09 PROCEDURE — 1126F PR PAIN SEVERITY QUANTIFIED, NO PAIN PRESENT: ICD-10-PCS | Mod: CPTII,S$GLB,, | Performed by: NURSE PRACTITIONER

## 2022-09-09 PROCEDURE — 1159F MED LIST DOCD IN RCRD: CPT | Mod: CPTII,S$GLB,, | Performed by: NURSE PRACTITIONER

## 2022-09-09 PROCEDURE — 3075F PR MOST RECENT SYSTOLIC BLOOD PRESS GE 130-139MM HG: ICD-10-PCS | Mod: CPTII,S$GLB,, | Performed by: NURSE PRACTITIONER

## 2022-09-09 PROCEDURE — 3075F SYST BP GE 130 - 139MM HG: CPT | Mod: CPTII,S$GLB,, | Performed by: NURSE PRACTITIONER

## 2022-09-09 PROCEDURE — 3008F BODY MASS INDEX DOCD: CPT | Mod: CPTII,S$GLB,, | Performed by: NURSE PRACTITIONER

## 2022-09-09 PROCEDURE — 81001 URINALYSIS AUTO W/SCOPE: CPT | Performed by: NURSE PRACTITIONER

## 2022-09-09 PROCEDURE — 1126F AMNT PAIN NOTED NONE PRSNT: CPT | Mod: CPTII,S$GLB,, | Performed by: NURSE PRACTITIONER

## 2022-09-09 PROCEDURE — 3078F PR MOST RECENT DIASTOLIC BLOOD PRESSURE < 80 MM HG: ICD-10-PCS | Mod: CPTII,S$GLB,, | Performed by: NURSE PRACTITIONER

## 2022-09-09 PROCEDURE — 1159F PR MEDICATION LIST DOCUMENTED IN MEDICAL RECORD: ICD-10-PCS | Mod: CPTII,S$GLB,, | Performed by: NURSE PRACTITIONER

## 2022-09-09 PROCEDURE — 99203 OFFICE O/P NEW LOW 30 MIN: CPT | Mod: S$GLB,,, | Performed by: NURSE PRACTITIONER

## 2022-09-09 PROCEDURE — 1101F PR PT FALLS ASSESS DOC 0-1 FALLS W/OUT INJ PAST YR: ICD-10-PCS | Mod: CPTII,S$GLB,, | Performed by: NURSE PRACTITIONER

## 2022-09-09 PROCEDURE — 3288F PR FALLS RISK ASSESSMENT DOCUMENTED: ICD-10-PCS | Mod: CPTII,S$GLB,, | Performed by: NURSE PRACTITIONER

## 2022-09-09 PROCEDURE — 1101F PT FALLS ASSESS-DOCD LE1/YR: CPT | Mod: CPTII,S$GLB,, | Performed by: NURSE PRACTITIONER

## 2022-09-09 PROCEDURE — 3072F PR LOW RISK FOR RETINOPATHY: ICD-10-PCS | Mod: CPTII,S$GLB,, | Performed by: NURSE PRACTITIONER

## 2022-09-09 PROCEDURE — 3078F DIAST BP <80 MM HG: CPT | Mod: CPTII,S$GLB,, | Performed by: NURSE PRACTITIONER

## 2022-09-09 PROCEDURE — 1160F PR REVIEW ALL MEDS BY PRESCRIBER/CLIN PHARMACIST DOCUMENTED: ICD-10-PCS | Mod: CPTII,S$GLB,, | Performed by: NURSE PRACTITIONER

## 2022-09-09 PROCEDURE — 3051F PR MOST RECENT HEMOGLOBIN A1C LEVEL 7.0 - < 8.0%: ICD-10-PCS | Mod: CPTII,S$GLB,, | Performed by: NURSE PRACTITIONER

## 2022-09-09 PROCEDURE — 87086 URINE CULTURE/COLONY COUNT: CPT | Performed by: NURSE PRACTITIONER

## 2022-09-09 PROCEDURE — 3051F HG A1C>EQUAL 7.0%<8.0%: CPT | Mod: CPTII,S$GLB,, | Performed by: NURSE PRACTITIONER

## 2022-09-09 PROCEDURE — 99203 PR OFFICE/OUTPT VISIT, NEW, LEVL III, 30-44 MIN: ICD-10-PCS | Mod: S$GLB,,, | Performed by: NURSE PRACTITIONER

## 2022-09-09 PROCEDURE — 3288F FALL RISK ASSESSMENT DOCD: CPT | Mod: CPTII,S$GLB,, | Performed by: NURSE PRACTITIONER

## 2022-09-09 PROCEDURE — 3072F LOW RISK FOR RETINOPATHY: CPT | Mod: CPTII,S$GLB,, | Performed by: NURSE PRACTITIONER

## 2022-09-09 PROCEDURE — 3008F PR BODY MASS INDEX (BMI) DOCUMENTED: ICD-10-PCS | Mod: CPTII,S$GLB,, | Performed by: NURSE PRACTITIONER

## 2022-09-10 LAB — BACTERIA UR CULT: NO GROWTH

## 2022-09-21 ENCOUNTER — INITIAL CONSULT (OUTPATIENT)
Dept: VASCULAR SURGERY | Facility: CLINIC | Age: 72
End: 2022-09-21
Payer: MEDICARE

## 2022-09-21 ENCOUNTER — PATIENT OUTREACH (OUTPATIENT)
Dept: ADMINISTRATIVE | Facility: HOSPITAL | Age: 72
End: 2022-09-21
Payer: MEDICARE

## 2022-09-21 DIAGNOSIS — I87.1 NUTCRACKER PHENOMENON OF RENAL VEIN: ICD-10-CM

## 2022-09-21 PROCEDURE — 99204 PR OFFICE/OUTPT VISIT, NEW, LEVL IV, 45-59 MIN: ICD-10-PCS | Mod: S$GLB,,, | Performed by: SURGERY

## 2022-09-21 PROCEDURE — 99204 OFFICE O/P NEW MOD 45 MIN: CPT | Mod: S$GLB,,, | Performed by: SURGERY

## 2022-09-21 NOTE — PROGRESS NOTES
"        Brad Schmitz MD, RPVI                                 [facility]                           [facility]                             09/21/2022    HPI:  Sarita Alvarez is a 72 y.o. 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    Went to ER for RLQ pain.    "The patient is a 72 y.o. female with PMHx including: DM type I, on lispro, HTN, HLD, SVT, thyroid disease who presents to the ED with a complaint of intermittent RLQ pain with onset last night. She noticed pain and and bloating after eating lima beans. She is able to pass gas. Denies dysuria, hematuria, nausea, vomiting, change in appetite, history of abdominal surgeries, and history of kidney stones."      Migraine with aura: no  PFO/ASD/right to left shunt: no  Pregnant: no  Breastfeeding: no    no MI  no Stroke  Tobacco use: denies    Past Medical History:   Diagnosis Date    Cataract     Diabetes mellitus type I     Diabetic peripheral neuropathy 7/9/2012    HTN (hypertension) 7/9/2012    Hyperlipidemia 7/9/2012    Osteoporosis, post-menopausal 7/9/2012    Poorly controlled type 1 diabetes mellitus with peripheral neuropathy 7/9/2012    Supraventricular tachycardia 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    SVT (supraventricular tachycardia) 7/9/2012    Thyroid disease     hyperthyroidism    Venous insufficiency (chronic) (peripheral) 7/9/2012 "     Past Surgical History:   Procedure Laterality Date    BREAST BIOPSY Left 12/23/2016    core    COLONOSCOPY  11/28/2016    DILATION AND CURETTAGE OF UTERUS      RADIOFREQUENCY ABLATION  6/17/14    AVNRT    WISDOM TOOTH EXTRACTION       Family History   Problem Relation Age of Onset    Heart disease Mother         valvular heart disease at 91    Hypertension Mother     Cataracts Mother     COPD Father     Glaucoma Father     Hypertension Sister     Glaucoma Sister     Allergies Sister     No Known Problems Brother     Hypertension Daughter     Fibroids Daughter     Hypertension Daughter     No Known Problems Daughter     Diabetes Maternal Aunt     Cataracts Maternal Aunt     No Known Problems Maternal Uncle     No Known Problems Paternal Aunt     No Known Problems Paternal Uncle     No Known Problems Maternal Grandmother     No Known Problems Maternal Grandfather     No Known Problems Paternal Grandmother     No Known Problems Paternal Grandfather     Amblyopia Neg Hx     Blindness Neg Hx     Cancer Neg Hx     Macular degeneration Neg Hx     Retinal detachment Neg Hx     Strabismus Neg Hx     Stroke Neg Hx     Thyroid disease Neg Hx     Ovarian cancer Neg Hx     Colon cancer Neg Hx     Breast cancer Neg Hx      Social History     Socioeconomic History    Marital status:    Tobacco Use    Smoking status: Never    Smokeless tobacco: Never   Substance and Sexual Activity    Alcohol use: No    Drug use: No    Sexual activity: Not Currently     Partners: Male   Social History Narrative    , 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 1 Tablet Oral Every day, Disp: , Rfl:     blood sugar diagnostic Strp, 1 each by Misc.(Non-Drug; Combo Route) route 4 (four) times daily., Disp: 400 strip, Rfl: 3    blood-glucose sensor  "(DEXCOM G6 SENSOR) [place], 3 each by Misc.(Non-Drug; Combo Route) route every 10 days., Disp: 3 each, Rfl: 11    blood-glucose transmitter (DEXCOM G6 TRANSMITTER) [place], 1 each by Misc.(Non-Drug; Combo Route) route every 3 (three) months., Disp: 2 each, Rfl: 1    CALCIUM CARBONATE/VITAMIN D3 (CALCIUM+D ORAL), Take by mouth. 1 Capsule Oral Twice a day  Pt taking once daily, Disp: , Rfl:     enalapril (VASOTEC) 20 MG tablet, Take 1 tablet (20 mg total) by mouth once daily., Disp: 90 tablet, Rfl: 3    hydroCHLOROthiazide (HYDRODIURIL) 25 MG tablet, Take 1 tablet by mouth once daily, Disp: 90 tablet, Rfl: 3    insulin degludec (TRESIBA FLEXTOUCH U-100) 100 unit/mL (3 mL) insulin pen, INJECT 15 UNITS SUBCUTANEOUSLY IN THE EVENING, Disp: 5 pen, Rfl: 2    insulin lispro 100 unit/mL pen, INJECT 6 UNITS SUBCUTANEOUSLY THREE TIMES DAILY WITH MEALS and adjust per sliding scale, Disp: 15 mL, Rfl: 3    insulin needles, disposable, (BD ULTRA-FINE MARIA D PEN NEEDLES) 32 x 5/32 " Ndle, 4 Sticks by [facility].(Non-Drug; Combo Route) route Daily., Disp: 400 each, Rfl: 1    lancets Misc, 1 each by Misc.(Non-Drug; Combo Route) route 4 (four) times daily., Disp: 400 each, Rfl: 3    loratadine (CLARITIN) 10 mg tablet, Take 10 mg by mouth daily as needed for Allergies., Disp: , Rfl:     methIMAzole (TAPAZOLE) 5 MG Tab, Take a half tablet (2.5 mg) by mouth once daily, Disp: 45 tablet, Rfl: 3    metoprolol succinate (TOPROL-XL) 25 MG 24 hr tablet, Take 1 tablet by mouth once daily, Disp: 90 tablet, Rfl: 3    simvastatin (ZOCOR) 20 MG tablet, Take 1 tablet (20 mg total) by mouth once daily., Disp: 90 tablet, Rfl: 1    blood-glucose meter kit, Use as instructed, Disp: 1 each, Rfl: 0    fluticasone (FLONASE) 50 mcg/actuation nasal spray, USE TWO SPRAY(S) IN EACH NOSTRIL ONCE DAILY (Patient not taking: Reported on 9/21/2022), Disp: 16 g, Rfl: 1    glucagon, human recombinant, (GLUCAGON EMERGENCY KIT, HUMAN,) 1 mg SolR, Inject 1 mg into the muscle as " needed (for severe hypoglycemia). (Patient not taking: No sig reported), Disp: 1 each, Rfl: 1    pantoprazole (PROTONIX) 40 MG tablet, Take 1 tablet (40 mg total) by mouth once daily. (Patient not taking: Reported on 9/21/2022), Disp: 90 tablet, Rfl: 1    REVIEW OF SYSTEMS:  General: No fevers or chills; ENT: No sore throat; Allergy and Immunology: no persistent infections; Hematological and Lymphatic: No history of bleeding or easy bruising; Endocrine: negative; Respiratory: no cough, shortness of breath, or wheezing; Cardiovascular: no chest pain or dyspnea on exertion; Gastrointestinal: no abdominal pain/back, change in bowel habits, or bloody stools; Genito-Urinary: no dysuria, trouble voiding, or hematuria; Musculoskeletal: edema; Neurological: no TIA or stroke symptoms; Psychiatric: no nervousness, anxiety or depression.    PHYSICAL EXAM:                General appearance:  Alert, well-appearing, and in no distress.  Oriented to person, place, and time                    Neurological: Normal speech, no focal findings noted; CN II - XII grossly intact. RLE with sensation to light touch, LLE with sensation to light touch.            Musculoskeletal: Digits/nail without cyanosis/clubbing.  Strength 5/5 BLE.                    Neck: Supple                  Chest:  No use of accessory muscles               Cardiac: Normal rate and regular rhythm            Abdomen: Soft, nontender, nondistended      Extremities:   No edema, no flank pain    Skin:  Normal color        LAB RESULTS:  No results found for: CBC  Lab Results   Component Value Date    LABPROT 9.7 06/13/2014    INR 0.9 06/13/2014     Lab Results   Component Value Date     07/13/2022    K 3.8 07/13/2022     07/13/2022    CO2 26 07/13/2022     (H) 07/13/2022    BUN 18 07/13/2022    CREATININE 0.9 07/13/2022    CALCIUM 9.8 07/13/2022    ANIONGAP 10 07/13/2022    EGFRNONAA >60.0 07/13/2022     Lab Results   Component Value Date    WBC 8.05  07/13/2022    RBC 4.96 07/13/2022    HGB 13.7 07/13/2022    HCT 40 07/13/2022    MCV 86 07/13/2022    MCH 27.6 07/13/2022    MCHC 32.2 07/13/2022    RDW 12.4 07/13/2022     07/13/2022    MPV 10.0 07/13/2022    GRAN 6.0 07/13/2022    GRAN 74.0 (H) 07/13/2022    LYMPH 1.3 07/13/2022    LYMPH 16.5 (L) 07/13/2022    MONO 0.5 07/13/2022    MONO 6.7 07/13/2022    EOS 0.2 07/13/2022    BASO 0.05 07/13/2022    EOSINOPHIL 2.1 07/13/2022    BASOPHIL 0.6 07/13/2022    DIFFMETHOD Automated 07/13/2022     .  Lab Results   Component Value Date    HGBA1C 7.7 (H) 08/23/2022       IMAGING:  All pertinent imaging has been reviewed and interpreted independently.    CT A/P reviewed.    IMP/PLAN:  72 y.o. 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    -recommend monitoring of hematuria, flank pain and pelvic pain  -RTC 6 months for further evaluation    I spent 15 minutes evaluating this patient and greater than 50% of the time was spent counseling, coordinator care and discussing the plan of care.  All questions were answered and patient stated understanding with agreement with the above treatment plan.    Brad Schmitz MD [facility]  Vascular and Endovascular Surgery

## 2022-09-23 ENCOUNTER — PROCEDURE VISIT (OUTPATIENT)
Dept: UROLOGY | Facility: CLINIC | Age: 72
End: 2022-09-23
Payer: MEDICARE

## 2022-09-23 VITALS — SYSTOLIC BLOOD PRESSURE: 154 MMHG | HEART RATE: 57 BPM | DIASTOLIC BLOOD PRESSURE: 65 MMHG

## 2022-09-23 DIAGNOSIS — R31.29 MICROHEMATURIA: ICD-10-CM

## 2022-09-23 PROCEDURE — 52000 CYSTOURETHROSCOPY: CPT | Mod: S$GLB,,, | Performed by: UROLOGY

## 2022-09-23 PROCEDURE — 52000 CYSTOSCOPY: ICD-10-PCS | Mod: S$GLB,,, | Performed by: UROLOGY

## 2022-09-23 RX ORDER — CIPROFLOXACIN 500 MG/1
500 TABLET ORAL
Status: COMPLETED | OUTPATIENT
Start: 2022-09-23 | End: 2022-09-23

## 2022-09-23 RX ORDER — LIDOCAINE HYDROCHLORIDE 20 MG/ML
JELLY TOPICAL
Status: COMPLETED | OUTPATIENT
Start: 2022-09-23 | End: 2022-09-23

## 2022-09-23 RX ADMIN — LIDOCAINE HYDROCHLORIDE 1 ML: 20 JELLY TOPICAL at 03:09

## 2022-09-23 RX ADMIN — CIPROFLOXACIN 500 MG: 500 TABLET ORAL at 03:09

## 2022-09-23 NOTE — PROCEDURES
Cystoscopy    Date/Time: 9/23/2022 9:12 AM  Performed by: Skinny Hinkle MD  Authorized by: Anne Garcia NP     Consent Done?:  Yes (Written)  Timeout: prior to procedure the correct patient, procedure, and site was verified    Prep: patient was prepped and draped in usual sterile fashion    Local anesthesia used?: Yes    Anesthesia:  Lidocaine jelly  Indications: hematuria    Position:  Dorsal lithotomy  Anesthesia:  Lidocaine jelly  Preparation: Patient was prepped and draped in usual sterile fashion    Scope type:  Flexible cystoscope  Digital exam performed: Yes    Urethra normal: Yes    Bladder neck normal: Yes    Bladder normal: Yes     patient tolerated the procedure well with no immediate complications  Comments:      CT normal kidneys    Microhematuria neg work up    Reassured pt  See NP 6 months to recheck UA

## 2022-10-12 DIAGNOSIS — I47.10 SVT (SUPRAVENTRICULAR TACHYCARDIA): ICD-10-CM

## 2022-10-12 RX ORDER — METOPROLOL SUCCINATE 25 MG/1
TABLET, EXTENDED RELEASE ORAL
Qty: 90 TABLET | Refills: 3 | Status: SHIPPED | OUTPATIENT
Start: 2022-10-12 | End: 2023-09-13 | Stop reason: SDUPTHER

## 2022-10-12 NOTE — TELEPHONE ENCOUNTER
No new care gaps identified.  [facility] Embedded Care Gaps. Reference number: [id number]. 10/12/2022   5:30:57 AM REGINALDT

## 2022-10-12 NOTE — TELEPHONE ENCOUNTER
Refill Routing Note   Medication(s) are not appropriate for processing by [facility] for the following reason(s):      - Required vitals are abnormal    [facility] action(s):  Defer          Medication reconciliation completed: No     Appointments  past 12m or future 3m with PCP    Date Provider   Last Visit   8/23/2022 Venu Ford MD   Next Visit   2/22/2023 Venu Ford MD   ED visits in past 90 days: 0        Note composed:8:01 AM 10/12/2022

## 2022-10-13 ENCOUNTER — HOSPITAL ENCOUNTER (OUTPATIENT)
Dept: RADIOLOGY | Facility: HOSPITAL | Age: 72
Discharge: HOME | End: 2022-10-13
Attending: INTERNAL MEDICINE
Payer: MEDICARE

## 2022-10-13 VITALS — WEIGHT: 112 LBS | BODY MASS INDEX: 19.84 KG/M2

## 2022-10-13 DIAGNOSIS — Z12.31 ENCOUNTER FOR SCREENING MAMMOGRAM FOR BREAST CANCER: ICD-10-CM

## 2022-10-13 PROCEDURE — 77063 MAMMO DIGITAL SCREENING BILAT WITH TOMO: ICD-10-PCS | Mod: 26,,, | Performed by: RADIOLOGY

## 2022-10-13 PROCEDURE — 77063 BREAST TOMOSYNTHESIS BI: CPT | Mod: 26,,, | Performed by: RADIOLOGY

## 2022-10-13 PROCEDURE — 77063 BREAST TOMOSYNTHESIS BI: CPT | Mod: TC

## 2022-10-13 PROCEDURE — 77067 MAMMO DIGITAL SCREENING BILAT WITH TOMO: ICD-10-PCS | Mod: 26,,, | Performed by: RADIOLOGY

## 2022-10-13 PROCEDURE — 77067 SCR MAMMO BI INCL CAD: CPT | Mod: 26,,, | Performed by: RADIOLOGY

## 2022-10-14 DIAGNOSIS — I47.10 SVT (SUPRAVENTRICULAR TACHYCARDIA): ICD-10-CM

## 2022-10-14 RX ORDER — METOPROLOL SUCCINATE 25 MG/1
25 TABLET, EXTENDED RELEASE ORAL DAILY
Qty: 90 TABLET | Refills: 3 | OUTPATIENT
Start: 2022-10-14

## 2022-10-14 NOTE — TELEPHONE ENCOUNTER
Refill Decision Note   Sarita Alvarez  is requesting a refill authorization.  Brief Assessment and Rationale for Refill:  Quick Discontinue     Medication Therapy Plan:  Called patient and left voicemail that her prescription was called in 2 days ago and should be ready for pickup. Advised patient to call pharmacy first.    Medication Reconciliation Completed: Yes   Comments: Receipt confirmed by requesting pharmacy    No Care Gaps recommended.     Note composed:2:50 PM 10/14/2022

## 2022-10-14 NOTE — TELEPHONE ENCOUNTER
----- Message from Francie Oconnell sent at 10/14/2022  2:22 PM CDT -----  Can the clinic reply in [facility]:no              Please refill the medication(s) listed below. Please call the patient when the prescription(s) is ready for  at this phone [phone]              Medication #1metoprolol succinate (TOPROL-XL) 25 MG 24 hr tablet            Medication #2              Preferred Pharmacy:[facility] - [place] [address]

## 2022-11-09 ENCOUNTER — PATIENT OUTREACH (OUTPATIENT)
Dept: ADMINISTRATIVE | Facility: HOSPITAL | Age: 72
End: 2022-11-09
Payer: MEDICARE

## 2022-11-09 DIAGNOSIS — E11.9 DIABETES MELLITUS WITHOUT COMPLICATION: Primary | ICD-10-CM

## 2022-12-01 ENCOUNTER — IMMUNIZATION (OUTPATIENT)
Dept: INTERNAL MEDICINE | Facility: CLINIC | Age: 72
End: 2022-12-01
Payer: MEDICARE

## 2022-12-01 DIAGNOSIS — Z23 NEED FOR VACCINATION: Primary | ICD-10-CM

## 2022-12-01 PROCEDURE — 91312 COVID-19, MRNA, LNP-S, BIVALENT BOOSTER, PF, 30 MCG/0.3 ML DOSE: ICD-10-PCS | Mod: S$GLB,,, | Performed by: INTERNAL MEDICINE

## 2022-12-01 PROCEDURE — 0124A COVID-19, MRNA, LNP-S, BIVALENT BOOSTER, PF, 30 MCG/0.3 ML DOSE: CPT | Mod: PBBFAC | Performed by: INTERNAL MEDICINE

## 2022-12-01 PROCEDURE — 91312 COVID-19, MRNA, LNP-S, BIVALENT BOOSTER, PF, 30 MCG/0.3 ML DOSE: CPT | Mod: S$GLB,,, | Performed by: INTERNAL MEDICINE

## 2022-12-05 ENCOUNTER — PES CALL (OUTPATIENT)
Dept: ADMINISTRATIVE | Facility: CLINIC | Age: 72
End: 2022-12-05
Payer: MEDICARE

## 2022-12-07 DIAGNOSIS — E11.9 TYPE 2 DIABETES MELLITUS WITHOUT COMPLICATION: ICD-10-CM

## 2022-12-12 ENCOUNTER — PES CALL (OUTPATIENT)
Dept: ADMINISTRATIVE | Facility: CLINIC | Age: 72
End: 2022-12-12
Payer: MEDICARE

## 2022-12-20 ENCOUNTER — OFFICE VISIT (OUTPATIENT)
Dept: OBSTETRICS AND GYNECOLOGY | Facility: CLINIC | Age: 72
End: 2022-12-20
Attending: OBSTETRICS & GYNECOLOGY
Payer: MEDICARE

## 2022-12-20 VITALS
BODY MASS INDEX: 19.39 KG/M2 | SYSTOLIC BLOOD PRESSURE: 134 MMHG | DIASTOLIC BLOOD PRESSURE: 62 MMHG | HEIGHT: 64 IN | WEIGHT: 113.56 LBS

## 2022-12-20 DIAGNOSIS — Z01.419 WOMEN'S ANNUAL ROUTINE GYNECOLOGICAL EXAMINATION: Primary | ICD-10-CM

## 2022-12-20 DIAGNOSIS — Z78.0 POSTMENOPAUSAL STATUS: ICD-10-CM

## 2022-12-20 PROCEDURE — 3051F PR MOST RECENT HEMOGLOBIN A1C LEVEL 7.0 - < 8.0%: ICD-10-PCS | Mod: CPTII,S$GLB,, | Performed by: OBSTETRICS & GYNECOLOGY

## 2022-12-20 PROCEDURE — 3075F PR MOST RECENT SYSTOLIC BLOOD PRESS GE 130-139MM HG: ICD-10-PCS | Mod: CPTII,S$GLB,, | Performed by: OBSTETRICS & GYNECOLOGY

## 2022-12-20 PROCEDURE — 99999 PR PBB SHADOW E&M-EST. PATIENT-LVL III: ICD-10-PCS | Mod: PBBFAC,,, | Performed by: OBSTETRICS & GYNECOLOGY

## 2022-12-20 PROCEDURE — 1126F AMNT PAIN NOTED NONE PRSNT: CPT | Mod: CPTII,S$GLB,, | Performed by: OBSTETRICS & GYNECOLOGY

## 2022-12-20 PROCEDURE — 99999 PR PBB SHADOW E&M-EST. PATIENT-LVL III: CPT | Mod: PBBFAC,,, | Performed by: OBSTETRICS & GYNECOLOGY

## 2022-12-20 PROCEDURE — 3008F PR BODY MASS INDEX (BMI) DOCUMENTED: ICD-10-PCS | Mod: CPTII,S$GLB,, | Performed by: OBSTETRICS & GYNECOLOGY

## 2022-12-20 PROCEDURE — 3072F LOW RISK FOR RETINOPATHY: CPT | Mod: CPTII,S$GLB,, | Performed by: OBSTETRICS & GYNECOLOGY

## 2022-12-20 PROCEDURE — 1159F PR MEDICATION LIST DOCUMENTED IN MEDICAL RECORD: ICD-10-PCS | Mod: CPTII,S$GLB,, | Performed by: OBSTETRICS & GYNECOLOGY

## 2022-12-20 PROCEDURE — 4010F ACE/ARB THERAPY RXD/TAKEN: CPT | Mod: CPTII,S$GLB,, | Performed by: OBSTETRICS & GYNECOLOGY

## 2022-12-20 PROCEDURE — 1160F RVW MEDS BY RX/DR IN RCRD: CPT | Mod: CPTII,S$GLB,, | Performed by: OBSTETRICS & GYNECOLOGY

## 2022-12-20 PROCEDURE — 3078F DIAST BP <80 MM HG: CPT | Mod: CPTII,S$GLB,, | Performed by: OBSTETRICS & GYNECOLOGY

## 2022-12-20 PROCEDURE — 3008F BODY MASS INDEX DOCD: CPT | Mod: CPTII,S$GLB,, | Performed by: OBSTETRICS & GYNECOLOGY

## 2022-12-20 PROCEDURE — G0101 PR CA SCREEN;PELVIC/BREAST EXAM: ICD-10-PCS | Mod: S$GLB,,, | Performed by: OBSTETRICS & GYNECOLOGY

## 2022-12-20 PROCEDURE — 3288F FALL RISK ASSESSMENT DOCD: CPT | Mod: CPTII,S$GLB,, | Performed by: OBSTETRICS & GYNECOLOGY

## 2022-12-20 PROCEDURE — 3051F HG A1C>EQUAL 7.0%<8.0%: CPT | Mod: CPTII,S$GLB,, | Performed by: OBSTETRICS & GYNECOLOGY

## 2022-12-20 PROCEDURE — 3078F PR MOST RECENT DIASTOLIC BLOOD PRESSURE < 80 MM HG: ICD-10-PCS | Mod: CPTII,S$GLB,, | Performed by: OBSTETRICS & GYNECOLOGY

## 2022-12-20 PROCEDURE — 1101F PR PT FALLS ASSESS DOC 0-1 FALLS W/OUT INJ PAST YR: ICD-10-PCS | Mod: CPTII,S$GLB,, | Performed by: OBSTETRICS & GYNECOLOGY

## 2022-12-20 PROCEDURE — 1159F MED LIST DOCD IN RCRD: CPT | Mod: CPTII,S$GLB,, | Performed by: OBSTETRICS & GYNECOLOGY

## 2022-12-20 PROCEDURE — 3075F SYST BP GE 130 - 139MM HG: CPT | Mod: CPTII,S$GLB,, | Performed by: OBSTETRICS & GYNECOLOGY

## 2022-12-20 PROCEDURE — 4010F PR ACE/ARB THEARPY RXD/TAKEN: ICD-10-PCS | Mod: CPTII,S$GLB,, | Performed by: OBSTETRICS & GYNECOLOGY

## 2022-12-20 PROCEDURE — 1160F PR REVIEW ALL MEDS BY PRESCRIBER/CLIN PHARMACIST DOCUMENTED: ICD-10-PCS | Mod: CPTII,S$GLB,, | Performed by: OBSTETRICS & GYNECOLOGY

## 2022-12-20 PROCEDURE — 3288F PR FALLS RISK ASSESSMENT DOCUMENTED: ICD-10-PCS | Mod: CPTII,S$GLB,, | Performed by: OBSTETRICS & GYNECOLOGY

## 2022-12-20 PROCEDURE — 1101F PT FALLS ASSESS-DOCD LE1/YR: CPT | Mod: CPTII,S$GLB,, | Performed by: OBSTETRICS & GYNECOLOGY

## 2022-12-20 PROCEDURE — G0101 CA SCREEN;PELVIC/BREAST EXAM: HCPCS | Mod: S$GLB,,, | Performed by: OBSTETRICS & GYNECOLOGY

## 2022-12-20 PROCEDURE — 3072F PR LOW RISK FOR RETINOPATHY: ICD-10-PCS | Mod: CPTII,S$GLB,, | Performed by: OBSTETRICS & GYNECOLOGY

## 2022-12-20 PROCEDURE — 1126F PR PAIN SEVERITY QUANTIFIED, NO PAIN PRESENT: ICD-10-PCS | Mod: CPTII,S$GLB,, | Performed by: OBSTETRICS & GYNECOLOGY

## 2022-12-20 NOTE — PROGRESS NOTES
Sarita Alvarez is a 72 y.o. year old  female who presents for routine GYN exam.  She is postmenopausal, not on hormones.  Denies bleeding but still has occasional sweats.  History of hypertension and DM.  Last HgbA1c 7.7.  Recent mammogram was negative.  No GYN complaints.    20 Pap: Negative    10/13/22 Mammogram: Negative, TC 2.09%    22 CT Abdomen / pelvis:  Reproductive organs: Few uterine calcifications, possible fibroids.      Past Medical History:   Diagnosis Date    Cataract     Diabetes mellitus type I     Diabetic peripheral neuropathy 2012    HTN (hypertension) 2012    Hyperlipidemia 2012    Osteoporosis, post-menopausal 2012    Poorly controlled type 1 diabetes mellitus with peripheral neuropathy 2012    Supraventricular tachycardia 14    AVNRT    SVT (supraventricular tachycardia) 2012    Thyroid disease     hyperthyroidism    Venous insufficiency (chronic) (peripheral) 2012       Past Surgical History:   Procedure Laterality Date    BREAST BIOPSY Left 2016    core, FA    COLONOSCOPY  2016    DILATION AND CURETTAGE OF UTERUS      RADIOFREQUENCY ABLATION  2014    AVNRT    WISDOM TOOTH EXTRACTION         OB History          3    Para   3    Term   3            AB        Living   3         SAB        IAB        Ectopic        Multiple        Live Births                       ROS:  GENERAL: Feeling well overall.   SKIN: Denies rash or lesions.   HEAD: Denies head injury or headache.   NODES: Denies enlarged lymph nodes.   CHEST: Denies chest pain or shortness of breath.   CARDIOVASCULAR: Denies palpitations or left sided chest pain.   ABDOMEN: No abdominal pain.  Reports constipation.  URINARY: No dysuria or hematuria.  REPRODUCTIVE: See HPI.   BREASTS: Denies pain, lumps, or nipple discharge.   HEMATOLOGIC: No easy bruisability or excessive bleeding.   MUSCULOSKELETAL: Reports some joint discomfort.  NEUROLOGIC: Denies  syncope or weakness.   PSYCHIATRIC: Denies depression.     PE:   (chaperone present during entire exam)  APPEARANCE: Well nourished, well developed, in no acute distress.  BREASTS: Symmetrical, no skin changes or visible lesions. No palpable masses, nipple discharge or adenopathy bilaterally.  ABDOMEN: Soft. No tenderness or masses. No CVA tenderness.  VULVA: Atrophic. No lesions. Normal female genitalia.  URETHRAL MEATUS: Normal size and location, no lesions, no prolapse.  URETHRA: No masses, tenderness, prolapse or scarring.  VAGINA: Atrophic.  No lesions, no abnormal discharge, no significant cystocele or rectocele.  CERVIX: No lesions and discharge.  UTERUS: Normal size, regular shape, mobile, non-tender, bladder base nontender.  ADNEXA: No masses, tenderness or CDS nodularity.  ANUS PERINEUM: Normal.    Diagnosis:  1. Women's annual routine gynecological examination    2. Postmenopausal status          PLAN:         Patient was counseled today on postmenopausal issues.    Follow-up in 1 year.

## 2023-01-05 ENCOUNTER — TELEPHONE (OUTPATIENT)
Dept: CARDIOLOGY | Facility: CLINIC | Age: 73
End: 2023-01-05
Payer: MEDICARE

## 2023-01-05 ENCOUNTER — OFFICE VISIT (OUTPATIENT)
Dept: CARDIOLOGY | Facility: CLINIC | Age: 73
End: 2023-01-05
Payer: MEDICARE

## 2023-01-05 VITALS
HEIGHT: 64 IN | WEIGHT: 113.56 LBS | SYSTOLIC BLOOD PRESSURE: 140 MMHG | DIASTOLIC BLOOD PRESSURE: 70 MMHG | OXYGEN SATURATION: 99 % | BODY MASS INDEX: 19.39 KG/M2 | HEART RATE: 55 BPM

## 2023-01-05 DIAGNOSIS — E13.9 LATENT AUTOIMMUNE DIABETES IN ADULTS (LADA), MANAGED AS TYPE 1: ICD-10-CM

## 2023-01-05 DIAGNOSIS — I47.10 SUPRAVENTRICULAR TACHYCARDIA: ICD-10-CM

## 2023-01-05 DIAGNOSIS — I70.0 AORTIC ATHEROSCLEROSIS: ICD-10-CM

## 2023-01-05 DIAGNOSIS — I10 ESSENTIAL HYPERTENSION: Primary | Chronic | ICD-10-CM

## 2023-01-05 DIAGNOSIS — I87.2 VENOUS INSUFFICIENCY (CHRONIC) (PERIPHERAL): Chronic | ICD-10-CM

## 2023-01-05 DIAGNOSIS — E05.90 HYPERTHYROIDISM: ICD-10-CM

## 2023-01-05 DIAGNOSIS — E78.2 MIXED HYPERLIPIDEMIA: Chronic | ICD-10-CM

## 2023-01-05 DIAGNOSIS — E06.3 HASHIMOTO'S DISEASE: ICD-10-CM

## 2023-01-05 PROCEDURE — 1159F MED LIST DOCD IN RCRD: CPT | Mod: CPTII,S$GLB,, | Performed by: INTERNAL MEDICINE

## 2023-01-05 PROCEDURE — 1159F PR MEDICATION LIST DOCUMENTED IN MEDICAL RECORD: ICD-10-PCS | Mod: CPTII,S$GLB,, | Performed by: INTERNAL MEDICINE

## 2023-01-05 PROCEDURE — 99213 PR OFFICE/OUTPT VISIT, EST, LEVL III, 20-29 MIN: ICD-10-PCS | Mod: S$GLB,,, | Performed by: INTERNAL MEDICINE

## 2023-01-05 PROCEDURE — 3008F PR BODY MASS INDEX (BMI) DOCUMENTED: ICD-10-PCS | Mod: CPTII,S$GLB,, | Performed by: INTERNAL MEDICINE

## 2023-01-05 PROCEDURE — 3008F BODY MASS INDEX DOCD: CPT | Mod: CPTII,S$GLB,, | Performed by: INTERNAL MEDICINE

## 2023-01-05 PROCEDURE — 3077F SYST BP >= 140 MM HG: CPT | Mod: CPTII,S$GLB,, | Performed by: INTERNAL MEDICINE

## 2023-01-05 PROCEDURE — 1126F AMNT PAIN NOTED NONE PRSNT: CPT | Mod: CPTII,S$GLB,, | Performed by: INTERNAL MEDICINE

## 2023-01-05 PROCEDURE — 3288F PR FALLS RISK ASSESSMENT DOCUMENTED: ICD-10-PCS | Mod: CPTII,S$GLB,, | Performed by: INTERNAL MEDICINE

## 2023-01-05 PROCEDURE — 99999 PR PBB SHADOW E&M-EST. PATIENT-LVL IV: ICD-10-PCS | Mod: PBBFAC,,, | Performed by: INTERNAL MEDICINE

## 2023-01-05 PROCEDURE — 3078F PR MOST RECENT DIASTOLIC BLOOD PRESSURE < 80 MM HG: ICD-10-PCS | Mod: CPTII,S$GLB,, | Performed by: INTERNAL MEDICINE

## 2023-01-05 PROCEDURE — 99999 PR PBB SHADOW E&M-EST. PATIENT-LVL IV: CPT | Mod: PBBFAC,,, | Performed by: INTERNAL MEDICINE

## 2023-01-05 PROCEDURE — 3288F FALL RISK ASSESSMENT DOCD: CPT | Mod: CPTII,S$GLB,, | Performed by: INTERNAL MEDICINE

## 2023-01-05 PROCEDURE — 3077F PR MOST RECENT SYSTOLIC BLOOD PRESSURE >= 140 MM HG: ICD-10-PCS | Mod: CPTII,S$GLB,, | Performed by: INTERNAL MEDICINE

## 2023-01-05 PROCEDURE — 3078F DIAST BP <80 MM HG: CPT | Mod: CPTII,S$GLB,, | Performed by: INTERNAL MEDICINE

## 2023-01-05 PROCEDURE — 1101F PT FALLS ASSESS-DOCD LE1/YR: CPT | Mod: CPTII,S$GLB,, | Performed by: INTERNAL MEDICINE

## 2023-01-05 PROCEDURE — 1126F PR PAIN SEVERITY QUANTIFIED, NO PAIN PRESENT: ICD-10-PCS | Mod: CPTII,S$GLB,, | Performed by: INTERNAL MEDICINE

## 2023-01-05 PROCEDURE — 1101F PR PT FALLS ASSESS DOC 0-1 FALLS W/OUT INJ PAST YR: ICD-10-PCS | Mod: CPTII,S$GLB,, | Performed by: INTERNAL MEDICINE

## 2023-01-05 PROCEDURE — 99213 OFFICE O/P EST LOW 20 MIN: CPT | Mod: S$GLB,,, | Performed by: INTERNAL MEDICINE

## 2023-01-05 NOTE — PROGRESS NOTES
Subjective:    Patient ID:  Sarita Alvarez is a 72 y.o. female who presents for follow-up of hypertension    HPI  The patient is a 72 year old female is followed with hypertension, hyperlipidemia, insulin depended diabetes and post ablation of AVNRT in 2014. She continues to do well and has no palpitations or chest pain. She remains active and exercises regularly.  Lab Results   Component Value Date     07/13/2022    K 3.8 07/13/2022     07/13/2022    CO2 26 07/13/2022    BUN 18 07/13/2022    CREATININE 0.9 07/13/2022     (H) 07/13/2022    HGBA1C 7.7 (H) 08/23/2022    MG 2.3 04/26/2014    AST 16 07/13/2022    ALT 15 07/13/2022    ALBUMIN 4.0 07/13/2022    PROT 7.3 07/13/2022    BILITOT 0.5 07/13/2022    WBC 8.05 07/13/2022    HGB 13.7 07/13/2022    HCT 40 07/13/2022    HCT 42.6 07/13/2022    MCV 86 07/13/2022     07/13/2022    INR 0.9 06/13/2014    TSH 1.765 05/10/2022    TSH 1.765 05/10/2022         Lab Results   Component Value Date    CHOL 146 11/26/2021    HDL 72 11/26/2021    TRIG 32 11/26/2021       Lab Results   Component Value Date    LDLCALC 67.6 11/26/2021       Past Medical History:   Diagnosis Date    Cataract     Diabetes mellitus type I     Diabetic peripheral neuropathy 7/9/2012    HTN (hypertension) 7/9/2012    Hyperlipidemia 7/9/2012    Osteoporosis, post-menopausal 7/9/2012    Poorly controlled type 1 diabetes mellitus with peripheral neuropathy 7/9/2012    Supraventricular tachycardia 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 1 Tablet Oral Every day, Disp: , Rfl:     blood sugar diagnostic Strp, 1 each by Misc.(Non-Drug; Combo Route) route 4 (four) times daily., Disp: 400  "strip, Rfl: 3    blood-glucose sensor (DEXCOM G6 SENSOR) [place], 3 each by Misc.(Non-Drug; Combo Route) route every 10 days., Disp: 3 each, Rfl: 11    blood-glucose transmitter (DEXCOM G6 TRANSMITTER) [place], 1 each by Misc.(Non-Drug; Combo Route) route every 3 (three) months., Disp: 2 each, Rfl: 1    CALCIUM CARBONATE/VITAMIN D3 (CALCIUM+D ORAL), Take by mouth. 1 Capsule Oral Twice a day  Pt taking once daily, Disp: , Rfl:     enalapril (VASOTEC) 20 MG tablet, Take 1 tablet by mouth once daily, Disp: 90 tablet, Rfl: 0    fluticasone (FLONASE) 50 mcg/actuation nasal spray, USE TWO SPRAY(S) IN EACH NOSTRIL ONCE DAILY, Disp: 16 g, Rfl: 1    hydroCHLOROthiazide (HYDRODIURIL) 25 MG tablet, Take 1 tablet by mouth once daily, Disp: 90 tablet, Rfl: 3    insulin degludec (TRESIBA FLEXTOUCH U-100) 100 unit/mL (3 mL) insulin pen, INJECT 15 UNITS SUBCUTANEOUSLY IN THE EVENING, Disp: 5 pen, Rfl: 2    insulin lispro 100 unit/mL pen, INJECT 6 UNITS SUBCUTANEOUSLY THREE TIMES DAILY WITH MEALS and adjust per sliding scale, Disp: 15 mL, Rfl: 3    insulin needles, disposable, (BD ULTRA-FINE MARIA D PEN NEEDLES) 32 x 5/32 " Ndle, 4 Sticks by [facility]Non-Drug; Combo Route) route Daily., Disp: 400 each, Rfl: 1    lancets Misc, 1 each by Misc.(Non-Drug; Combo Route) route 4 (four) times daily., Disp: 400 each, Rfl: 3    loratadine (CLARITIN) 10 mg tablet, Take 10 mg by mouth daily as needed for Allergies., Disp: , Rfl:     methIMAzole (TAPAZOLE) 5 MG Tab, Take a half tablet (2.5 mg) by mouth once daily, Disp: 45 tablet, Rfl: 3    metoprolol succinate (TOPROL-XL) 25 MG 24 hr tablet, Take 1 tablet by mouth once daily, Disp: 90 tablet, Rfl: 3    pantoprazole (PROTONIX) 40 MG tablet, Take 1 tablet (40 mg total) by mouth once daily., Disp: 90 tablet, Rfl: 1    simvastatin (ZOCOR) 20 MG tablet, Take 1 tablet (20 mg total) by mouth once daily., Disp: 90 tablet, Rfl: 1    blood-glucose meter kit, Use as instructed, Disp: 1 each, Rfl: 0    glucagon, human " "recombinant, (GLUCAGON EMERGENCY KIT, HUMAN,) 1 mg SolR, Inject 1 mg into the muscle as needed (for severe hypoglycemia). (Patient not taking: No sig reported), Disp: 1 each, Rfl: 1          Review of Systems   Constitutional: Negative for decreased appetite, diaphoresis, fever, malaise/fatigue, weight gain and weight loss.   HENT:  Negative for congestion, ear discharge, ear pain and nosebleeds.    Eyes:  Negative for blurred vision, double vision and visual disturbance.   Cardiovascular:  Negative for chest pain, claudication, cyanosis, dyspnea on exertion, irregular heartbeat, leg swelling, near-syncope, orthopnea, palpitations, paroxysmal nocturnal dyspnea and syncope.   Respiratory:  Negative for cough, hemoptysis, shortness of breath, sleep disturbances due to breathing, snoring, sputum production and wheezing.    Endocrine: Negative for polydipsia, polyphagia and polyuria.   Hematologic/Lymphatic: Negative for adenopathy and bleeding problem. Does not bruise/bleed easily.   Skin:  Negative for color change, nail changes, poor wound healing and rash.   Musculoskeletal:  Negative for muscle cramps and muscle weakness.   Gastrointestinal:  Negative for abdominal pain, anorexia, change in bowel habit, hematochezia, nausea and vomiting.   Genitourinary:  Negative for dysuria, frequency and hematuria.   Neurological:  Negative for brief paralysis, difficulty with concentration, excessive daytime sleepiness, dizziness, focal weakness, headaches, light-headedness, seizures, vertigo and weakness.   Psychiatric/Behavioral:  Negative for altered mental status and depression.    Allergic/Immunologic: Negative for persistent infections.      Objective:BP (!) 140/70   Pulse (!) 55   Ht 5' 4" (1.626 m)   Wt 51.5 kg (113 lb 8.6 oz)   SpO2 99%   BMI 19.49 kg/m²             Physical Exam  Constitutional:       Appearance: Normal appearance. She is well-developed and normal weight.   HENT:      Head: Normocephalic.      " Right Ear: External ear normal.      Left Ear: External ear normal.      Nose: Nose normal.   Eyes:      General: No scleral icterus.     Conjunctiva/sclera: Conjunctivae normal.      Pupils: Pupils are equal, round, and reactive to light.   Neck:      Thyroid: No thyromegaly.      Vascular: No JVD.      Trachea: No tracheal deviation.   Cardiovascular:      Rate and Rhythm: Normal rate and regular rhythm.      Pulses: Intact distal pulses.           Carotid pulses are 2+ on the right side and 2+ on the left side.       Dorsalis pedis pulses are 0 on the right side and 0 on the left side.        Posterior tibial pulses are 0 on the right side and 0 on the left side.      Heart sounds: Normal heart sounds. No murmur heard.    No friction rub. No gallop.   Pulmonary:      Effort: Pulmonary effort is normal. No respiratory distress.      Breath sounds: Normal breath sounds. No wheezing or rales.   Chest:      Chest wall: No tenderness.   Abdominal:      General: Bowel sounds are normal. There is no distension.      Palpations: Abdomen is soft.      Tenderness: There is no abdominal tenderness. There is no guarding.   Musculoskeletal:         General: No tenderness. Normal range of motion.      Cervical back: Normal range of motion.   Lymphadenopathy:      Comments: Palpation of lymph nodes of neck and groin normal   Skin:     General: Skin is warm and dry.      Coloration: Skin is not pale.      Findings: No erythema or rash.      Comments: Palpation of skin normal   Neurological:      Mental Status: She is alert and oriented to person, place, and time.      Cranial Nerves: No cranial nerve deficit.      Motor: No abnormal muscle tone.      Coordination: Coordination normal.   Psychiatric:         Behavior: Behavior normal.         Thought Content: Thought content normal.         Judgment: Judgment normal.       Assessment:       1. Essential hypertension    2. Supraventricular tachycardia    3. Aortic atherosclerosis     4. Mixed hyperlipidemia    5. Venous insufficiency (chronic) (peripheral)    6. Hashimoto's disease    7. Hyperthyroidism    8. 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

## 2023-01-06 ENCOUNTER — PES CALL (OUTPATIENT)
Dept: ADMINISTRATIVE | Facility: CLINIC | Age: 73
End: 2023-01-06
Payer: MEDICARE

## 2023-01-18 ENCOUNTER — PATIENT MESSAGE (OUTPATIENT)
Dept: ADMINISTRATIVE | Facility: HOSPITAL | Age: 73
End: 2023-01-18
Payer: MEDICARE

## 2023-02-07 ENCOUNTER — TELEPHONE (OUTPATIENT)
Dept: INTERNAL MEDICINE | Facility: CLINIC | Age: 73
End: 2023-02-07
Payer: MEDICARE

## 2023-02-08 DIAGNOSIS — E11.9 TYPE 2 DIABETES MELLITUS WITHOUT COMPLICATION: ICD-10-CM

## 2023-02-09 NOTE — TELEPHONE ENCOUNTER
Called pt as second attempt   Spoke to her   She said she wants to see Dr. Ford     I think we opened   9:45 am on 3.1.23  She said that time would work. I am routing to self so I can double check and will call her back

## 2023-02-09 NOTE — TELEPHONE ENCOUNTER
Called pt to let her know that the 3.1. appt isn't open but found her another slot  Pt verbalized understanding and had no further concerns or questions.  Held slot  Msgd overbook to schedule

## 2023-02-17 ENCOUNTER — PATIENT OUTREACH (OUTPATIENT)
Dept: ADMINISTRATIVE | Facility: HOSPITAL | Age: 73
End: 2023-02-17
Payer: MEDICARE

## 2023-02-17 ENCOUNTER — TELEPHONE (OUTPATIENT)
Dept: ENDOCRINOLOGY | Facility: CLINIC | Age: 73
End: 2023-02-17
Payer: MEDICARE

## 2023-02-17 DIAGNOSIS — E10.42 DIABETIC PERIPHERAL NEUROPATHY ASSOCIATED WITH TYPE 1 DIABETES MELLITUS: ICD-10-CM

## 2023-02-17 DIAGNOSIS — E78.2 MIXED HYPERLIPIDEMIA: Primary | ICD-10-CM

## 2023-02-18 ENCOUNTER — LAB VISIT (OUTPATIENT)
Dept: LAB | Facility: HOSPITAL | Age: 73
End: 2023-02-18
Attending: INTERNAL MEDICINE
Payer: MEDICARE

## 2023-02-18 DIAGNOSIS — E11.9 DIABETES MELLITUS WITHOUT COMPLICATION: ICD-10-CM

## 2023-02-18 DIAGNOSIS — E11.9 TYPE 2 DIABETES MELLITUS WITHOUT COMPLICATION: ICD-10-CM

## 2023-02-18 LAB
CHOLEST SERPL-MCNC: 157 MG/DL (ref 120–199)
CHOLEST/HDLC SERPL: 2 {RATIO} (ref 2–5)
ESTIMATED AVG GLUCOSE: 177 MG/DL (ref 68–131)
HBA1C MFR BLD: 7.8 % (ref 4–5.6)
HDLC SERPL-MCNC: 78 MG/DL (ref 40–75)
HDLC SERPL: 49.7 % (ref 20–50)
LDLC SERPL CALC-MCNC: 70.2 MG/DL (ref 63–159)
NONHDLC SERPL-MCNC: 79 MG/DL
TRIGL SERPL-MCNC: 44 MG/DL (ref 30–150)

## 2023-02-18 PROCEDURE — 36415 COLL VENOUS BLD VENIPUNCTURE: CPT | Performed by: INTERNAL MEDICINE

## 2023-02-18 PROCEDURE — 80061 LIPID PANEL: CPT | Performed by: INTERNAL MEDICINE

## 2023-02-18 PROCEDURE — 83036 HEMOGLOBIN GLYCOSYLATED A1C: CPT | Performed by: INTERNAL MEDICINE

## 2023-02-20 ENCOUNTER — OFFICE VISIT (OUTPATIENT)
Dept: OPTOMETRY | Facility: CLINIC | Age: 73
End: 2023-02-20
Payer: MEDICARE

## 2023-02-20 ENCOUNTER — PES CALL (OUTPATIENT)
Dept: ADMINISTRATIVE | Facility: CLINIC | Age: 73
End: 2023-02-20
Payer: MEDICARE

## 2023-02-20 DIAGNOSIS — H52.203 MYOPIA WITH ASTIGMATISM AND PRESBYOPIA, BILATERAL: ICD-10-CM

## 2023-02-20 DIAGNOSIS — H52.4 MYOPIA WITH ASTIGMATISM AND PRESBYOPIA, BILATERAL: ICD-10-CM

## 2023-02-20 DIAGNOSIS — Z83.511 FAMILY HISTORY OF GLAUCOMA IN FATHER: ICD-10-CM

## 2023-02-20 DIAGNOSIS — E10.9 TYPE 1 DIABETES MELLITUS WITHOUT RETINOPATHY: Primary | ICD-10-CM

## 2023-02-20 DIAGNOSIS — H25.13 NUCLEAR SCLEROSIS OF BOTH EYES: ICD-10-CM

## 2023-02-20 DIAGNOSIS — H52.13 MYOPIA WITH ASTIGMATISM AND PRESBYOPIA, BILATERAL: ICD-10-CM

## 2023-02-20 PROCEDURE — 3061F NEG MICROALBUMINURIA REV: CPT | Mod: CPTII,S$GLB,, | Performed by: OPTOMETRIST

## 2023-02-20 PROCEDURE — 3051F HG A1C>EQUAL 7.0%<8.0%: CPT | Mod: CPTII,S$GLB,, | Performed by: OPTOMETRIST

## 2023-02-20 PROCEDURE — 3061F PR NEG MICROALBUMINURIA RESULT DOCUMENTED/REVIEW: ICD-10-PCS | Mod: CPTII,S$GLB,, | Performed by: OPTOMETRIST

## 2023-02-20 PROCEDURE — 4010F ACE/ARB THERAPY RXD/TAKEN: CPT | Mod: CPTII,S$GLB,, | Performed by: OPTOMETRIST

## 2023-02-20 PROCEDURE — 99999 PR PBB SHADOW E&M-EST. PATIENT-LVL III: CPT | Mod: PBBFAC,,, | Performed by: OPTOMETRIST

## 2023-02-20 PROCEDURE — 92015 PR REFRACTION: ICD-10-PCS | Mod: S$GLB,,, | Performed by: OPTOMETRIST

## 2023-02-20 PROCEDURE — 1159F MED LIST DOCD IN RCRD: CPT | Mod: CPTII,S$GLB,, | Performed by: OPTOMETRIST

## 2023-02-20 PROCEDURE — 4010F PR ACE/ARB THEARPY RXD/TAKEN: ICD-10-PCS | Mod: CPTII,S$GLB,, | Performed by: OPTOMETRIST

## 2023-02-20 PROCEDURE — 3066F PR DOCUMENTATION OF TREATMENT FOR NEPHROPATHY: ICD-10-PCS | Mod: CPTII,S$GLB,, | Performed by: OPTOMETRIST

## 2023-02-20 PROCEDURE — 2023F PR DILATED RETINAL EXAM W/O EVID OF RETINOPATHY: ICD-10-PCS | Mod: CPTII,S$GLB,, | Performed by: OPTOMETRIST

## 2023-02-20 PROCEDURE — 1101F PR PT FALLS ASSESS DOC 0-1 FALLS W/OUT INJ PAST YR: ICD-10-PCS | Mod: CPTII,S$GLB,, | Performed by: OPTOMETRIST

## 2023-02-20 PROCEDURE — 3051F PR MOST RECENT HEMOGLOBIN A1C LEVEL 7.0 - < 8.0%: ICD-10-PCS | Mod: CPTII,S$GLB,, | Performed by: OPTOMETRIST

## 2023-02-20 PROCEDURE — 92014 COMPRE OPH EXAM EST PT 1/>: CPT | Mod: S$GLB,,, | Performed by: OPTOMETRIST

## 2023-02-20 PROCEDURE — 99999 PR PBB SHADOW E&M-EST. PATIENT-LVL III: ICD-10-PCS | Mod: PBBFAC,,, | Performed by: OPTOMETRIST

## 2023-02-20 PROCEDURE — 1159F PR MEDICATION LIST DOCUMENTED IN MEDICAL RECORD: ICD-10-PCS | Mod: CPTII,S$GLB,, | Performed by: OPTOMETRIST

## 2023-02-20 PROCEDURE — 92015 DETERMINE REFRACTIVE STATE: CPT | Mod: S$GLB,,, | Performed by: OPTOMETRIST

## 2023-02-20 PROCEDURE — 2023F DILAT RTA XM W/O RTNOPTHY: CPT | Mod: CPTII,S$GLB,, | Performed by: OPTOMETRIST

## 2023-02-20 PROCEDURE — 3066F NEPHROPATHY DOC TX: CPT | Mod: CPTII,S$GLB,, | Performed by: OPTOMETRIST

## 2023-02-20 PROCEDURE — 3288F PR FALLS RISK ASSESSMENT DOCUMENTED: ICD-10-PCS | Mod: CPTII,S$GLB,, | Performed by: OPTOMETRIST

## 2023-02-20 PROCEDURE — 92014 PR EYE EXAM, EST PATIENT,COMPREHESV: ICD-10-PCS | Mod: S$GLB,,, | Performed by: OPTOMETRIST

## 2023-02-20 PROCEDURE — 3288F FALL RISK ASSESSMENT DOCD: CPT | Mod: CPTII,S$GLB,, | Performed by: OPTOMETRIST

## 2023-02-20 PROCEDURE — 1101F PT FALLS ASSESS-DOCD LE1/YR: CPT | Mod: CPTII,S$GLB,, | Performed by: OPTOMETRIST

## 2023-02-20 NOTE — PROGRESS NOTES
HPI    Last eye exam was 9/30/21 with Dr. Rosas.  Patient states no vision changes since last exam.  Patient denies diplopia, headaches, flashes/floaters, and pain.    Hemoglobin A1C       Date                     Value               Ref Range             Status                02/18/2023               7.8 (H)             4.0 - 5.6 %           Final                Last edited by Margy Palmer MA on 2/20/2023  9:57 AM.            Assessment /Plan     For exam results, see Encounter Report.    Type 1 diabetes mellitus without retinopathy    Nuclear sclerosis of both eyes    Family history of glaucoma in father    Myopia with astigmatism and presbyopia, bilateral          1.  No retinopathy--monitor yearly.  BS control.  2.  Educated on cataracts and affects on vision.  Patient happy with vision.  Monitor.  3.  No signs of glaucoma.  Eye health normal OU. Monitor yearly.  4.  Distance rx given.  Retina flat and intact OU--no holes, tears, breaks, or RDs.

## 2023-03-03 ENCOUNTER — OFFICE VISIT (OUTPATIENT)
Dept: INTERNAL MEDICINE | Facility: CLINIC | Age: 73
End: 2023-03-03
Payer: MEDICARE

## 2023-03-03 VITALS
DIASTOLIC BLOOD PRESSURE: 56 MMHG | WEIGHT: 113.63 LBS | OXYGEN SATURATION: 99 % | HEIGHT: 64 IN | BODY MASS INDEX: 19.4 KG/M2 | SYSTOLIC BLOOD PRESSURE: 134 MMHG | HEART RATE: 59 BPM

## 2023-03-03 DIAGNOSIS — E05.90 HYPERTHYROIDISM: ICD-10-CM

## 2023-03-03 DIAGNOSIS — E78.2 MIXED HYPERLIPIDEMIA: Chronic | ICD-10-CM

## 2023-03-03 DIAGNOSIS — M81.0 OSTEOPOROSIS, POST-MENOPAUSAL: Chronic | ICD-10-CM

## 2023-03-03 DIAGNOSIS — E10.42 DIABETIC PERIPHERAL NEUROPATHY ASSOCIATED WITH TYPE 1 DIABETES MELLITUS: ICD-10-CM

## 2023-03-03 DIAGNOSIS — E13.9 LATENT AUTOIMMUNE DIABETES IN ADULTS (LADA), MANAGED AS TYPE 1: ICD-10-CM

## 2023-03-03 DIAGNOSIS — I47.10 SUPRAVENTRICULAR TACHYCARDIA: ICD-10-CM

## 2023-03-03 DIAGNOSIS — E06.3 HASHIMOTO'S DISEASE: ICD-10-CM

## 2023-03-03 DIAGNOSIS — I70.0 AORTIC ATHEROSCLEROSIS: ICD-10-CM

## 2023-03-03 DIAGNOSIS — I10 ESSENTIAL HYPERTENSION: Primary | Chronic | ICD-10-CM

## 2023-03-03 PROCEDURE — 1126F PR PAIN SEVERITY QUANTIFIED, NO PAIN PRESENT: ICD-10-PCS | Mod: CPTII,S$GLB,, | Performed by: INTERNAL MEDICINE

## 2023-03-03 PROCEDURE — 1126F AMNT PAIN NOTED NONE PRSNT: CPT | Mod: CPTII,S$GLB,, | Performed by: INTERNAL MEDICINE

## 2023-03-03 PROCEDURE — 3061F NEG MICROALBUMINURIA REV: CPT | Mod: CPTII,S$GLB,, | Performed by: INTERNAL MEDICINE

## 2023-03-03 PROCEDURE — 1101F PT FALLS ASSESS-DOCD LE1/YR: CPT | Mod: CPTII,S$GLB,, | Performed by: INTERNAL MEDICINE

## 2023-03-03 PROCEDURE — 3051F PR MOST RECENT HEMOGLOBIN A1C LEVEL 7.0 - < 8.0%: ICD-10-PCS | Mod: CPTII,S$GLB,, | Performed by: INTERNAL MEDICINE

## 2023-03-03 PROCEDURE — 1159F PR MEDICATION LIST DOCUMENTED IN MEDICAL RECORD: ICD-10-PCS | Mod: CPTII,S$GLB,, | Performed by: INTERNAL MEDICINE

## 2023-03-03 PROCEDURE — 3008F PR BODY MASS INDEX (BMI) DOCUMENTED: ICD-10-PCS | Mod: CPTII,S$GLB,, | Performed by: INTERNAL MEDICINE

## 2023-03-03 PROCEDURE — 3288F FALL RISK ASSESSMENT DOCD: CPT | Mod: CPTII,S$GLB,, | Performed by: INTERNAL MEDICINE

## 2023-03-03 PROCEDURE — 1160F RVW MEDS BY RX/DR IN RCRD: CPT | Mod: CPTII,S$GLB,, | Performed by: INTERNAL MEDICINE

## 2023-03-03 PROCEDURE — 1160F PR REVIEW ALL MEDS BY PRESCRIBER/CLIN PHARMACIST DOCUMENTED: ICD-10-PCS | Mod: CPTII,S$GLB,, | Performed by: INTERNAL MEDICINE

## 2023-03-03 PROCEDURE — 4010F ACE/ARB THERAPY RXD/TAKEN: CPT | Mod: CPTII,S$GLB,, | Performed by: INTERNAL MEDICINE

## 2023-03-03 PROCEDURE — 4010F PR ACE/ARB THEARPY RXD/TAKEN: ICD-10-PCS | Mod: CPTII,S$GLB,, | Performed by: INTERNAL MEDICINE

## 2023-03-03 PROCEDURE — 3288F PR FALLS RISK ASSESSMENT DOCUMENTED: ICD-10-PCS | Mod: CPTII,S$GLB,, | Performed by: INTERNAL MEDICINE

## 2023-03-03 PROCEDURE — 99999 PR PBB SHADOW E&M-EST. PATIENT-LVL IV: ICD-10-PCS | Mod: PBBFAC,,, | Performed by: INTERNAL MEDICINE

## 2023-03-03 PROCEDURE — 99214 PR OFFICE/OUTPT VISIT, EST, LEVL IV, 30-39 MIN: ICD-10-PCS | Mod: S$GLB,,, | Performed by: INTERNAL MEDICINE

## 2023-03-03 PROCEDURE — 3066F PR DOCUMENTATION OF TREATMENT FOR NEPHROPATHY: ICD-10-PCS | Mod: CPTII,S$GLB,, | Performed by: INTERNAL MEDICINE

## 2023-03-03 PROCEDURE — 3078F PR MOST RECENT DIASTOLIC BLOOD PRESSURE < 80 MM HG: ICD-10-PCS | Mod: CPTII,S$GLB,, | Performed by: INTERNAL MEDICINE

## 2023-03-03 PROCEDURE — 3051F HG A1C>EQUAL 7.0%<8.0%: CPT | Mod: CPTII,S$GLB,, | Performed by: INTERNAL MEDICINE

## 2023-03-03 PROCEDURE — 1159F MED LIST DOCD IN RCRD: CPT | Mod: CPTII,S$GLB,, | Performed by: INTERNAL MEDICINE

## 2023-03-03 PROCEDURE — 3075F SYST BP GE 130 - 139MM HG: CPT | Mod: CPTII,S$GLB,, | Performed by: INTERNAL MEDICINE

## 2023-03-03 PROCEDURE — 3078F DIAST BP <80 MM HG: CPT | Mod: CPTII,S$GLB,, | Performed by: INTERNAL MEDICINE

## 2023-03-03 PROCEDURE — 3075F PR MOST RECENT SYSTOLIC BLOOD PRESS GE 130-139MM HG: ICD-10-PCS | Mod: CPTII,S$GLB,, | Performed by: INTERNAL MEDICINE

## 2023-03-03 PROCEDURE — 99999 PR PBB SHADOW E&M-EST. PATIENT-LVL IV: CPT | Mod: PBBFAC,,, | Performed by: INTERNAL MEDICINE

## 2023-03-03 PROCEDURE — 1101F PR PT FALLS ASSESS DOC 0-1 FALLS W/OUT INJ PAST YR: ICD-10-PCS | Mod: CPTII,S$GLB,, | Performed by: INTERNAL MEDICINE

## 2023-03-03 PROCEDURE — 99214 OFFICE O/P EST MOD 30 MIN: CPT | Mod: S$GLB,,, | Performed by: INTERNAL MEDICINE

## 2023-03-03 PROCEDURE — 3061F PR NEG MICROALBUMINURIA RESULT DOCUMENTED/REVIEW: ICD-10-PCS | Mod: CPTII,S$GLB,, | Performed by: INTERNAL MEDICINE

## 2023-03-03 PROCEDURE — 3066F NEPHROPATHY DOC TX: CPT | Mod: CPTII,S$GLB,, | Performed by: INTERNAL MEDICINE

## 2023-03-03 PROCEDURE — 3008F BODY MASS INDEX DOCD: CPT | Mod: CPTII,S$GLB,, | Performed by: INTERNAL MEDICINE

## 2023-03-03 NOTE — PROGRESS NOTES
Subjective:       Patient ID: Sarita Alvarez is a 73 y.o. female.    Chief Complaint: Hypertension    Pt here for f/u. 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 She is asymptomatic and saw vascular who recommended monitoring. She is due for f/u. She saw urology for microhematuria but w/u was o/w unrevealing; she is overdue for f/u with them as well.      Pt has DM1 and is followed by endocrinology. This is not well controlled as last A1c was 7.8. She is on Tresiba 15 units and novolog SSI per carb counting (usually 3-5 units). She continues to self-adjust insulin. Her fasting sugars are 130s-140s with some excursions higher and prandial sugars are 140s-160s with some excursions higher. She has occasional low in 60s which she attributes to doing too much physical activity at the time; she is able to recognize and correct. She has complications of diabetes that includes neuropathy in feet. This is tolerable without meds. Other health maint is up to date.    She also sees endocrinology for hyperthyroidism/hashimotos. She is on tapazole and monitored regularly by endocrinology; however, she is overdue for f/u. She is clinically euthyroid.     She has varicose veins that cause some swelling at times but manages with compression hose. Has seen vascular in past.     Pt's BP is well controlled. Tolerating meds well. Pt denies cp/sob/ha/vision or neuro changes. Checking at home and is well controlled.     HLD is tx with simvastatin which she tolerates well. She has aortic atherosclerosis and is on appropriate risk mitigation tx.    Pt has osteoporosis but most recent DEXA 6/2020 showed osteopenia. She has not tolerated actonel due to CP in past, presumably esophagitis. She is not willing to try another bisphosphonate either oral or  IV. We discussed prolia but she declines. She understands r/b of this. She is engaged in resistance exercises.      Pt had symptomatic SVT s/p ablation and has not had recurrence.      Diabetes  Pertinent negatives for hypoglycemia include no dizziness or headaches. Pertinent negatives for diabetes include no chest pain and no polyuria.   Medication Refill  Pertinent negatives include no chest pain or headaches.   Hypertension  Pertinent negatives include no chest pain, headaches or shortness of breath.   Review of Systems   Constitutional:  Negative for unexpected weight change.   Eyes:  Negative for visual disturbance.   Respiratory:  Negative for shortness of breath.    Cardiovascular:  Negative for chest pain.   Gastrointestinal:  Negative for blood in stool.   Endocrine: Negative for polyuria.   Genitourinary:  Negative for frequency.   Neurological:  Negative for dizziness, light-headedness and headaches.   Psychiatric/Behavioral:  Negative for dysphoric mood.      Objective:          Assessment:       1. Essential hypertension    2. Mixed hyperlipidemia    3. Aortic atherosclerosis    4. Diabetic peripheral neuropathy associated with type 1 diabetes mellitus    5. 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 Supraventricular tachycardia    8. Hashimoto's disease    9. Hyperthyroidism        Plan:       1. Recent labs reviewed     2. Keep f/u with specialists  3. Home BP and BS monitoring; reviewed hypoglycemia plan with pt  4. Keep f/u with specialists    Physical Exam  Constitutional:       Appearance: Normal appearance. She is well-developed.   HENT:      Head: Normocephalic and atraumatic.      Right Ear: Hearing, tympanic membrane and ear canal normal.      Left Ear: Hearing, tympanic membrane and ear canal normal.      Mouth/Throat:      Pharynx: No oropharyngeal exudate or posterior oropharyngeal erythema.   Eyes:      Extraocular Movements:  Extraocular movements intact.      Pupils: Pupils are equal, round, and reactive to light.   Neck:      Thyroid: No thyroid mass or thyromegaly.      Vascular: No carotid bruit.   Cardiovascular:      Rate and Rhythm: Normal rate and regular rhythm.      Pulses:           Radial pulses are 2+ on the right side and 2+ on the left side.        Posterior tibial pulses are 2+ on the right side and 2+ on the left side.      Heart sounds: Normal heart sounds, S1 normal and S2 normal. No murmur heard.  Pulmonary:      Effort: Pulmonary effort is normal.      Breath sounds: Normal breath sounds. No wheezing.   Abdominal:      General: Bowel sounds are normal. There is no distension.      Palpations: Abdomen is soft. There is no mass.      Tenderness: There is no abdominal tenderness.   Musculoskeletal:      Cervical back: Neck supple.      Right lower leg: No edema.      Left lower leg: No edema.      Right foot: Normal. No deformity.      Left foot: Normal. No deformity.   Feet:      Right foot:      Protective Sensation: 6 sites tested.  6 sites sensed.      Skin integrity: No ulcer, blister or skin breakdown.      Left foot:      Protective Sensation: 6 sites tested.  6 sites sensed.      Skin integrity: No ulcer, blister or skin breakdown.   Lymphadenopathy:      Cervical: No cervical adenopathy.   Neurological:      Mental Status: She is alert and oriented to person, place, and time.      Cranial Nerves: No cranial nerve deficit.      Sensory: No sensory deficit.      Coordination: Coordination normal.      Gait: Gait normal.   Psychiatric:         Mood and Affect: Mood normal.         Behavior: Behavior normal.

## 2023-03-10 ENCOUNTER — OFFICE VISIT (OUTPATIENT)
Dept: VASCULAR SURGERY | Facility: CLINIC | Age: 73
End: 2023-03-10
Payer: MEDICARE

## 2023-03-10 VITALS
DIASTOLIC BLOOD PRESSURE: 70 MMHG | BODY MASS INDEX: 19.29 KG/M2 | WEIGHT: 113 LBS | SYSTOLIC BLOOD PRESSURE: 157 MMHG | HEIGHT: 64 IN | HEART RATE: 60 BPM

## 2023-03-10 DIAGNOSIS — I87.1 NUTCRACKER PHENOMENON OF RENAL VEIN: Primary | ICD-10-CM

## 2023-03-10 PROCEDURE — 1126F AMNT PAIN NOTED NONE PRSNT: CPT | Mod: CPTII,S$GLB,, | Performed by: SURGERY

## 2023-03-10 PROCEDURE — 99213 PR OFFICE/OUTPT VISIT, EST, LEVL III, 20-29 MIN: ICD-10-PCS | Mod: S$GLB,,, | Performed by: SURGERY

## 2023-03-10 PROCEDURE — 3061F PR NEG MICROALBUMINURIA RESULT DOCUMENTED/REVIEW: ICD-10-PCS | Mod: CPTII,S$GLB,, | Performed by: SURGERY

## 2023-03-10 PROCEDURE — 3077F PR MOST RECENT SYSTOLIC BLOOD PRESSURE >= 140 MM HG: ICD-10-PCS | Mod: CPTII,S$GLB,, | Performed by: SURGERY

## 2023-03-10 PROCEDURE — 3066F NEPHROPATHY DOC TX: CPT | Mod: CPTII,S$GLB,, | Performed by: SURGERY

## 2023-03-10 PROCEDURE — 3008F PR BODY MASS INDEX (BMI) DOCUMENTED: ICD-10-PCS | Mod: CPTII,S$GLB,, | Performed by: SURGERY

## 2023-03-10 PROCEDURE — 4010F PR ACE/ARB THEARPY RXD/TAKEN: ICD-10-PCS | Mod: CPTII,S$GLB,, | Performed by: SURGERY

## 2023-03-10 PROCEDURE — 99213 OFFICE O/P EST LOW 20 MIN: CPT | Mod: S$GLB,,, | Performed by: SURGERY

## 2023-03-10 PROCEDURE — 3061F NEG MICROALBUMINURIA REV: CPT | Mod: CPTII,S$GLB,, | Performed by: SURGERY

## 2023-03-10 PROCEDURE — 4010F ACE/ARB THERAPY RXD/TAKEN: CPT | Mod: CPTII,S$GLB,, | Performed by: SURGERY

## 2023-03-10 PROCEDURE — 3051F HG A1C>EQUAL 7.0%<8.0%: CPT | Mod: CPTII,S$GLB,, | Performed by: SURGERY

## 2023-03-10 PROCEDURE — 3051F PR MOST RECENT HEMOGLOBIN A1C LEVEL 7.0 - < 8.0%: ICD-10-PCS | Mod: CPTII,S$GLB,, | Performed by: SURGERY

## 2023-03-10 PROCEDURE — 3066F PR DOCUMENTATION OF TREATMENT FOR NEPHROPATHY: ICD-10-PCS | Mod: CPTII,S$GLB,, | Performed by: SURGERY

## 2023-03-10 PROCEDURE — 1159F MED LIST DOCD IN RCRD: CPT | Mod: CPTII,S$GLB,, | Performed by: SURGERY

## 2023-03-10 PROCEDURE — 3077F SYST BP >= 140 MM HG: CPT | Mod: CPTII,S$GLB,, | Performed by: SURGERY

## 2023-03-10 PROCEDURE — 1159F PR MEDICATION LIST DOCUMENTED IN MEDICAL RECORD: ICD-10-PCS | Mod: CPTII,S$GLB,, | Performed by: SURGERY

## 2023-03-10 PROCEDURE — 3008F BODY MASS INDEX DOCD: CPT | Mod: CPTII,S$GLB,, | Performed by: SURGERY

## 2023-03-10 PROCEDURE — 1126F PR PAIN SEVERITY QUANTIFIED, NO PAIN PRESENT: ICD-10-PCS | Mod: CPTII,S$GLB,, | Performed by: SURGERY

## 2023-03-10 PROCEDURE — 3078F DIAST BP <80 MM HG: CPT | Mod: CPTII,S$GLB,, | Performed by: SURGERY

## 2023-03-10 PROCEDURE — 3078F PR MOST RECENT DIASTOLIC BLOOD PRESSURE < 80 MM HG: ICD-10-PCS | Mod: CPTII,S$GLB,, | Performed by: SURGERY

## 2023-03-10 NOTE — PROGRESS NOTES
"        Brad Schmitz MD, RPVI                                 [facility]                           [facility]                             03/10/2023    HPI:  Sarita Alvarez is a 73 y.o. 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    Went to ER for RLQ pain.    "The patient is a 72 y.o. female with PMHx including: DM type I, on lispro, HTN, HLD, SVT, thyroid disease who presents to the ED with a complaint of intermittent RLQ pain with onset last night. She noticed pain and and bloating after eating lima beans. She is able to pass gas. Denies dysuria, hematuria, nausea, vomiting, change in appetite, history of abdominal surgeries, and history of kidney stones."      Migraine with aura: no  PFO/ASD/right to left shunt: no  Pregnant: no  Breastfeeding: no    no MI  no Stroke  Tobacco use: denies    3/2023:  No new complaints.    Past Medical History:   Diagnosis Date    Cataract     Diabetes mellitus type I     Diabetic peripheral neuropathy 7/9/2012    HTN (hypertension) 7/9/2012    Hyperlipidemia 7/9/2012    Osteoporosis, post-menopausal 7/9/2012    Poorly controlled type 1 diabetes mellitus with peripheral neuropathy 7/9/2012    Supraventricular tachycardia 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    SVT (supraventricular tachycardia) 7/9/2012    Thyroid disease     hyperthyroidism    Venous insufficiency " (chronic) (peripheral) 7/9/2012     Past Surgical History:   Procedure Laterality Date    BREAST BIOPSY Left 12/23/2016    core, FA    COLONOSCOPY  11/28/2016    DILATION AND CURETTAGE OF UTERUS      RADIOFREQUENCY ABLATION  06/17/2014    AVNRT    WISDOM TOOTH EXTRACTION       Family History   Problem Relation Age of Onset    Heart disease Mother         valvular heart disease at 91    Hypertension Mother     Cataracts Mother     COPD Father     Glaucoma Father     Hypertension Sister     Glaucoma Sister     Allergies Sister     No Known Problems Brother     Hypertension Daughter     Fibroids Daughter     Hypertension Daughter     No Known Problems Daughter     Diabetes Maternal Aunt     Cataracts Maternal Aunt     No Known Problems Maternal Uncle     No Known Problems Paternal Aunt     No Known Problems Paternal Uncle     No Known Problems Maternal Grandmother     No Known Problems Maternal Grandfather     No Known Problems Paternal Grandmother     No Known Problems Paternal Grandfather     Amblyopia Neg Hx     Blindness Neg Hx     Cancer Neg Hx     Macular degeneration Neg Hx     Retinal detachment Neg Hx     Strabismus Neg Hx     Stroke Neg Hx     Thyroid disease Neg Hx     Ovarian cancer Neg Hx     Colon cancer Neg Hx     Breast cancer Neg Hx      Social History     Socioeconomic History    Marital status:    Tobacco Use    Smoking status: Never    Smokeless tobacco: Never   Substance and Sexual Activity    Alcohol use: No    Drug use: No    Sexual activity: Not Currently     Partners: Male   Social History Narrative    , 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 1 Tablet Oral Every day, Disp: , Rfl:     blood sugar diagnostic Strp, 1 each by Misc.(Non-Drug; Combo Route) route 4 (four) times daily., Disp: 400  "strip, Rfl: 3    CALCIUM CARBONATE/VITAMIN D3 (CALCIUM+D ORAL), Take by mouth. 1 Capsule Oral Twice a day  Pt taking once daily, Disp: , Rfl:     enalapril (VASOTEC) 20 MG tablet, Take 1 tablet by mouth once daily, Disp: 90 tablet, Rfl: 2    fluticasone (FLONASE) 50 mcg/actuation nasal spray, USE TWO SPRAY(S) IN EACH NOSTRIL ONCE DAILY, Disp: 16 g, Rfl: 1    hydroCHLOROthiazide (HYDRODIURIL) 25 MG tablet, Take 1 tablet by mouth once daily, Disp: 90 tablet, Rfl: 3    insulin degludec (TRESIBA FLEXTOUCH U-100) 100 unit/mL (3 mL) insulin pen, INJECT 15 UNITS SUBCUTANEOUSLY IN THE EVENING, Disp: 5 pen, Rfl: 2    insulin lispro 100 unit/mL pen, INJECT 6 UNITS SUBCUTANEOUSLY THREE TIMES DAILY WITH MEALS and adjust per sliding scale, Disp: 15 mL, Rfl: 3    insulin needles, disposable, (BD ULTRA-FINE MARIA D PEN NEEDLES) 32 x 5/32 " Ndle, 4 Sticks by [facility].(Non-Drug; Combo Route) route Daily., Disp: 400 each, Rfl: 1    lancets Misc, 1 each by Misc.(Non-Drug; Combo Route) route 4 (four) times daily., Disp: 400 each, Rfl: 3    loratadine (CLARITIN) 10 mg tablet, Take 10 mg by mouth daily as needed for Allergies., Disp: , Rfl:     methIMAzole (TAPAZOLE) 5 MG Tab, Take 1/2 (one-half) tablet by mouth once daily, Disp: 45 tablet, Rfl: 1    metoprolol succinate (TOPROL-XL) 25 MG 24 hr tablet, Take 1 tablet by mouth once daily, Disp: 90 tablet, Rfl: 3    pantoprazole (PROTONIX) 40 MG tablet, Take 1 tablet (40 mg total) by mouth once daily. (Patient taking differently: Take 40 mg by mouth once daily. Taking PRN), Disp: 90 tablet, Rfl: 1    simvastatin (ZOCOR) 20 MG tablet, Take 1 tablet (20 mg total) by mouth once daily., Disp: 90 tablet, Rfl: 1    blood-glucose meter kit, Use as instructed, Disp: 1 each, Rfl: 0    blood-glucose sensor (DEXCOM G6 SENSOR) [place], 3 each by Misc.(Non-Drug; Combo Route) route every 10 days., Disp: 3 each, Rfl: 11    blood-glucose transmitter (DEXCOM G6 TRANSMITTER) Galina, 1 each by Misc.(Non-Drug; Combo " Route) route every 3 (three) months., Disp: 2 each, Rfl: 1    glucagon, human recombinant, (GLUCAGON EMERGENCY KIT, HUMAN,) 1 mg SolR, Inject 1 mg into the muscle as needed (for severe hypoglycemia). (Patient not taking: Reported on 12/3/2021), Disp: 1 each, Rfl: 1    REVIEW OF SYSTEMS:  General: No fevers or chills; ENT: No sore throat; Allergy and Immunology: no persistent infections; Hematological and Lymphatic: No history of bleeding or easy bruising; Endocrine: negative; Respiratory: no cough, shortness of breath, or wheezing; Cardiovascular: no chest pain or dyspnea on exertion; Gastrointestinal: no abdominal pain/back, change in bowel habits, or bloody stools; Genito-Urinary: no dysuria, trouble voiding, or hematuria; Musculoskeletal: edema; Neurological: no TIA or stroke symptoms; Psychiatric: no nervousness, anxiety or depression.    PHYSICAL EXAM:      Pulse: 60         General appearance:  Alert, well-appearing, and in no distress.  Oriented to person, place, and time                    Neurological: Normal speech, no focal findings noted; CN II - XII grossly intact. RLE with sensation to light touch, LLE with sensation to light touch.            Musculoskeletal: Digits/nail without cyanosis/clubbing.  Strength 5/5 BLE.                    Neck: Supple                  Chest:  No use of accessory muscles               Cardiac: Normal rate and regular rhythm            Abdomen: Soft, nontender, nondistended      Extremities:   No edema, no flank pain    Skin:  Normal color        LAB RESULTS:  No results found for: CBC  Lab Results   Component Value Date    LABPROT 9.7 06/13/2014    INR 0.9 06/13/2014     Lab Results   Component Value Date     07/13/2022    K 3.8 07/13/2022     07/13/2022    CO2 26 07/13/2022     (H) 07/13/2022    BUN 18 07/13/2022    CREATININE 0.9 07/13/2022    CALCIUM 9.8 07/13/2022    ANIONGAP 10 07/13/2022    EGFRNONAA >60.0 07/13/2022     Lab Results   Component  Value Date    WBC 8.05 07/13/2022    RBC 4.96 07/13/2022    HGB 13.7 07/13/2022    HCT 40 07/13/2022    MCV 86 07/13/2022    MCH 27.6 07/13/2022    MCHC 32.2 07/13/2022    RDW 12.4 07/13/2022     07/13/2022    MPV 10.0 07/13/2022    GRAN 6.0 07/13/2022    GRAN 74.0 (H) 07/13/2022    LYMPH 1.3 07/13/2022    LYMPH 16.5 (L) 07/13/2022    MONO 0.5 07/13/2022    MONO 6.7 07/13/2022    EOS 0.2 07/13/2022    BASO 0.05 07/13/2022    EOSINOPHIL 2.1 07/13/2022    BASOPHIL 0.6 07/13/2022    DIFFMETHOD Automated 07/13/2022     .  Lab Results   Component Value Date    HGBA1C 7.8 (H) 02/18/2023       IMAGING:  All pertinent imaging has been reviewed and interpreted independently.    CT A/P reviewed.    IMP/PLAN:  73 y.o. 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    -recommend monitoring of hematuria, flank pain and pelvic pain  -RTC prn    I spent 11 minutes evaluating this patient and greater than 50% of the time was spent counseling, coordinator care and discussing the plan of care.  All questions were answered and patient stated understanding with agreement with the above treatment plan.    Brad Schmitz MD [facility]  Vascular and Endovascular Surgery

## 2023-03-23 ENCOUNTER — OFFICE VISIT (OUTPATIENT)
Dept: UROLOGY | Facility: CLINIC | Age: 73
End: 2023-03-23
Payer: MEDICARE

## 2023-03-23 VITALS
BODY MASS INDEX: 19.29 KG/M2 | HEART RATE: 54 BPM | SYSTOLIC BLOOD PRESSURE: 147 MMHG | WEIGHT: 113 LBS | HEIGHT: 64 IN | DIASTOLIC BLOOD PRESSURE: 65 MMHG

## 2023-03-23 DIAGNOSIS — R31.29 MICROHEMATURIA: ICD-10-CM

## 2023-03-23 DIAGNOSIS — R82.90 ABNORMAL URINALYSIS: Primary | ICD-10-CM

## 2023-03-23 LAB
BACTERIA #/AREA URNS AUTO: ABNORMAL /HPF
MICROSCOPIC COMMENT: ABNORMAL
RBC #/AREA URNS AUTO: 5 /HPF (ref 0–4)
SQUAMOUS #/AREA URNS AUTO: 0 /HPF
WBC #/AREA URNS AUTO: 2 /HPF (ref 0–5)

## 2023-03-23 PROCEDURE — 3077F PR MOST RECENT SYSTOLIC BLOOD PRESSURE >= 140 MM HG: ICD-10-PCS | Mod: CPTII,S$GLB,, | Performed by: NURSE PRACTITIONER

## 2023-03-23 PROCEDURE — 3066F PR DOCUMENTATION OF TREATMENT FOR NEPHROPATHY: ICD-10-PCS | Mod: CPTII,S$GLB,, | Performed by: NURSE PRACTITIONER

## 2023-03-23 PROCEDURE — 3008F PR BODY MASS INDEX (BMI) DOCUMENTED: ICD-10-PCS | Mod: CPTII,S$GLB,, | Performed by: NURSE PRACTITIONER

## 2023-03-23 PROCEDURE — 3288F PR FALLS RISK ASSESSMENT DOCUMENTED: ICD-10-PCS | Mod: CPTII,S$GLB,, | Performed by: NURSE PRACTITIONER

## 2023-03-23 PROCEDURE — 87086 URINE CULTURE/COLONY COUNT: CPT | Performed by: NURSE PRACTITIONER

## 2023-03-23 PROCEDURE — 3078F DIAST BP <80 MM HG: CPT | Mod: CPTII,S$GLB,, | Performed by: NURSE PRACTITIONER

## 2023-03-23 PROCEDURE — 3061F NEG MICROALBUMINURIA REV: CPT | Mod: CPTII,S$GLB,, | Performed by: NURSE PRACTITIONER

## 2023-03-23 PROCEDURE — 3051F HG A1C>EQUAL 7.0%<8.0%: CPT | Mod: CPTII,S$GLB,, | Performed by: NURSE PRACTITIONER

## 2023-03-23 PROCEDURE — 1126F AMNT PAIN NOTED NONE PRSNT: CPT | Mod: CPTII,S$GLB,, | Performed by: NURSE PRACTITIONER

## 2023-03-23 PROCEDURE — 1160F RVW MEDS BY RX/DR IN RCRD: CPT | Mod: CPTII,S$GLB,, | Performed by: NURSE PRACTITIONER

## 2023-03-23 PROCEDURE — 3077F SYST BP >= 140 MM HG: CPT | Mod: CPTII,S$GLB,, | Performed by: NURSE PRACTITIONER

## 2023-03-23 PROCEDURE — 3051F PR MOST RECENT HEMOGLOBIN A1C LEVEL 7.0 - < 8.0%: ICD-10-PCS | Mod: CPTII,S$GLB,, | Performed by: NURSE PRACTITIONER

## 2023-03-23 PROCEDURE — 1160F PR REVIEW ALL MEDS BY PRESCRIBER/CLIN PHARMACIST DOCUMENTED: ICD-10-PCS | Mod: CPTII,S$GLB,, | Performed by: NURSE PRACTITIONER

## 2023-03-23 PROCEDURE — 99213 PR OFFICE/OUTPT VISIT, EST, LEVL III, 20-29 MIN: ICD-10-PCS | Mod: S$GLB,,, | Performed by: NURSE PRACTITIONER

## 2023-03-23 PROCEDURE — 99213 OFFICE O/P EST LOW 20 MIN: CPT | Mod: S$GLB,,, | Performed by: NURSE PRACTITIONER

## 2023-03-23 PROCEDURE — 1159F PR MEDICATION LIST DOCUMENTED IN MEDICAL RECORD: ICD-10-PCS | Mod: CPTII,S$GLB,, | Performed by: NURSE PRACTITIONER

## 2023-03-23 PROCEDURE — 4010F ACE/ARB THERAPY RXD/TAKEN: CPT | Mod: CPTII,S$GLB,, | Performed by: NURSE PRACTITIONER

## 2023-03-23 PROCEDURE — 4010F PR ACE/ARB THEARPY RXD/TAKEN: ICD-10-PCS | Mod: CPTII,S$GLB,, | Performed by: NURSE PRACTITIONER

## 2023-03-23 PROCEDURE — 3008F BODY MASS INDEX DOCD: CPT | Mod: CPTII,S$GLB,, | Performed by: NURSE PRACTITIONER

## 2023-03-23 PROCEDURE — 3061F PR NEG MICROALBUMINURIA RESULT DOCUMENTED/REVIEW: ICD-10-PCS | Mod: CPTII,S$GLB,, | Performed by: NURSE PRACTITIONER

## 2023-03-23 PROCEDURE — 3066F NEPHROPATHY DOC TX: CPT | Mod: CPTII,S$GLB,, | Performed by: NURSE PRACTITIONER

## 2023-03-23 PROCEDURE — 81001 URINALYSIS AUTO W/SCOPE: CPT | Performed by: NURSE PRACTITIONER

## 2023-03-23 PROCEDURE — 1101F PR PT FALLS ASSESS DOC 0-1 FALLS W/OUT INJ PAST YR: ICD-10-PCS | Mod: CPTII,S$GLB,, | Performed by: NURSE PRACTITIONER

## 2023-03-23 PROCEDURE — 3288F FALL RISK ASSESSMENT DOCD: CPT | Mod: CPTII,S$GLB,, | Performed by: NURSE PRACTITIONER

## 2023-03-23 PROCEDURE — 1159F MED LIST DOCD IN RCRD: CPT | Mod: CPTII,S$GLB,, | Performed by: NURSE PRACTITIONER

## 2023-03-23 PROCEDURE — 3078F PR MOST RECENT DIASTOLIC BLOOD PRESSURE < 80 MM HG: ICD-10-PCS | Mod: CPTII,S$GLB,, | Performed by: NURSE PRACTITIONER

## 2023-03-23 PROCEDURE — 1101F PT FALLS ASSESS-DOCD LE1/YR: CPT | Mod: CPTII,S$GLB,, | Performed by: NURSE PRACTITIONER

## 2023-03-23 PROCEDURE — 1126F PR PAIN SEVERITY QUANTIFIED, NO PAIN PRESENT: ICD-10-PCS | Mod: CPTII,S$GLB,, | Performed by: NURSE PRACTITIONER

## 2023-03-23 NOTE — PROGRESS NOTES
"Subjective:      Sarita Alvarez is a 73 y.o. female who returns today regarding her history of hematuria.     The patient presented to the clinic in September reporting microhematuria for several years. Non smoker.     CT abdomen/pelvis (7/13/22) - "Kidneys/ Ureters: Normal in size and location. Normal enhancement. No hydronephrosis or nephrolithiasis. No ureteral dilatation.  Bilateral hypodensities too small to characterize.  Bladder: No evidence of wall thickening. Vasculature: No aneurysm. Mild scattered calcific atherosclerosis.  Compression of the left renal vein by the SMA (axial series 2, image 36) with reduced aortic-SMA angle (sagittal series 602, image 89).  There is associated dilation of the left ovarian vein (axial series 2, image 55) and left adnexal varices (axial series 2, image 118)."     Cysto- no abnormalities (Dr. Hinkle)    She denies flank pain and bothersome urinary symptoms. Denies a history of recurrent UTIs and nephrolithiasis. Denies gross hematuria.     The following portions of the patient's history were reviewed and updated as appropriate: allergies, current medications, past family history, past medical history, past social history, past surgical history and problem list.    Review of Systems  Constitutional: no fever or chills  ENT: no nasal congestion or sore throat  Respiratory: no cough or shortness of breath  Cardiovascular: no chest pain or palpitations  Gastrointestinal: no nausea or vomiting, tolerating diet  Genitourinary: as per HPI  Hematologic/Lymphatic: no easy bruising or lymphadenopathy  Musculoskeletal: no arthralgias or myalgias  Neurological: no seizures or tremors  Behavioral/Psych: no auditory or visual hallucinations     Objective:   Vital Signs:  Vitals:    03/23/23 0846   BP: (!) 147/65   Pulse: (!) 54     Physical Exam   General: alert and oriented, no acute distress  Head: normocephalic, atraumatic  Neck: supple, normal ROM  Respiratory: Symmetric " expansion, non-labored breathing  Cardiovascular: regular rate and rhythm  Abdomen: soft, non tender, non distended  Pelvic: deferred  Skin: normal coloration and turgor, no rashes, no suspicious skin lesions noted  Neuro: alert and oriented x3, no gross deficits  Psych: normal judgment and insight, normal mood/affect, and non-anxious    Lab Review   Urinalysis demonstrates positive for leukocytes, red blood cells  Lab Results   Component Value Date    WBC 8.05 07/13/2022    HGB 13.7 07/13/2022    HCT 40 07/13/2022    HCT 42.6 07/13/2022    MCV 86 07/13/2022     07/13/2022     Lab Results   Component Value Date    CREATININE 0.9 07/13/2022    BUN 18 07/13/2022       Imaging   None    Assessment:     1. Abnormal urinalysis    2. Microhematuria      Plan:   Sarita was seen today for follow-up.    Diagnoses and all orders for this visit:    Abnormal urinalysis  -     Urine culture  -     Urinalysis Microscopic    Microhematuria    Plan:  --Discussed the AUA guidelines for microhematuria. Repeat every 3-5 years if persistent. Negative workup in 2022.  --Urine culture and micro UA today. Will notify if antibiotics are needed

## 2023-03-24 LAB
BACTERIA UR CULT: NORMAL
BACTERIA UR CULT: NORMAL

## 2023-04-12 ENCOUNTER — PES CALL (OUTPATIENT)
Dept: ADMINISTRATIVE | Facility: CLINIC | Age: 73
End: 2023-04-12
Payer: MEDICARE

## 2023-04-17 ENCOUNTER — HOSPITAL ENCOUNTER (EMERGENCY)
Facility: HOSPITAL | Age: 73
Discharge: HOME | End: 2023-04-17
Attending: EMERGENCY MEDICINE
Payer: MEDICARE

## 2023-04-17 VITALS
HEIGHT: 64 IN | TEMPERATURE: 98 F | BODY MASS INDEX: 19.29 KG/M2 | HEART RATE: 62 BPM | OXYGEN SATURATION: 100 % | SYSTOLIC BLOOD PRESSURE: 154 MMHG | DIASTOLIC BLOOD PRESSURE: 64 MMHG | RESPIRATION RATE: 17 BRPM | WEIGHT: 113 LBS

## 2023-04-17 DIAGNOSIS — R07.9 CHEST PAIN: ICD-10-CM

## 2023-04-17 DIAGNOSIS — R10.13 EPIGASTRIC ABDOMINAL PAIN: Primary | ICD-10-CM

## 2023-04-17 LAB
ALBUMIN SERPL BCP-MCNC: 4 G/DL (ref 3.5–5.2)
ALP SERPL-CCNC: 74 U/L (ref 55–135)
ALT SERPL W/O P-5'-P-CCNC: 16 U/L (ref 10–44)
ANION GAP SERPL CALC-SCNC: 10 MMOL/L (ref 8–16)
AST SERPL-CCNC: 17 U/L (ref 10–40)
BACTERIA #/AREA URNS AUTO: ABNORMAL /HPF
BASOPHILS # BLD AUTO: 0.04 K/UL (ref 0–0.2)
BASOPHILS NFR BLD: 0.5 % (ref 0–1.9)
BILIRUB SERPL-MCNC: 0.4 MG/DL (ref 0.1–1)
BILIRUB UR QL STRIP: NEGATIVE
BUN SERPL-MCNC: 21 MG/DL (ref 8–23)
CALCIUM SERPL-MCNC: 10 MG/DL (ref 8.7–10.5)
CHLORIDE SERPL-SCNC: 101 MMOL/L (ref 95–110)
CLARITY UR REFRACT.AUTO: CLEAR
CO2 SERPL-SCNC: 27 MMOL/L (ref 23–29)
COLOR UR AUTO: COLORLESS
CREAT SERPL-MCNC: 0.8 MG/DL (ref 0.5–1.4)
DIFFERENTIAL METHOD: ABNORMAL
EOSINOPHIL # BLD AUTO: 0.2 K/UL (ref 0–0.5)
EOSINOPHIL NFR BLD: 2.3 % (ref 0–8)
ERYTHROCYTE [DISTWIDTH] IN BLOOD BY AUTOMATED COUNT: 12.5 % (ref 11.5–14.5)
EST. GFR  (NO RACE VARIABLE): >60 ML/MIN/1.73 M^2
GLUCOSE SERPL-MCNC: 154 MG/DL (ref 70–110)
GLUCOSE UR QL STRIP: NEGATIVE
HCT VFR BLD AUTO: 43.4 % (ref 37–48.5)
HCV AB SERPL QL IA: NORMAL
HGB BLD-MCNC: 14.1 G/DL (ref 12–16)
HGB UR QL STRIP: ABNORMAL
HIV 1+2 AB+HIV1 P24 AG SERPL QL IA: NORMAL
IMM GRANULOCYTES # BLD AUTO: 0.02 K/UL (ref 0–0.04)
IMM GRANULOCYTES NFR BLD AUTO: 0.3 % (ref 0–0.5)
KETONES UR QL STRIP: NEGATIVE
LEUKOCYTE ESTERASE UR QL STRIP: NEGATIVE
LIPASE SERPL-CCNC: 14 U/L (ref 4–60)
LYMPHOCYTES # BLD AUTO: 1.1 K/UL (ref 1–4.8)
LYMPHOCYTES NFR BLD: 15.4 % (ref 18–48)
MCH RBC QN AUTO: 27.8 PG (ref 27–31)
MCHC RBC AUTO-ENTMCNC: 32.5 G/DL (ref 32–36)
MCV RBC AUTO: 86 FL (ref 82–98)
MICROSCOPIC COMMENT: ABNORMAL
MONOCYTES # BLD AUTO: 0.4 K/UL (ref 0.3–1)
MONOCYTES NFR BLD: 5.1 % (ref 4–15)
NEUTROPHILS # BLD AUTO: 5.6 K/UL (ref 1.8–7.7)
NEUTROPHILS NFR BLD: 76.4 % (ref 38–73)
NITRITE UR QL STRIP: NEGATIVE
NRBC BLD-RTO: 0 /100 WBC
PH UR STRIP: 7 [PH] (ref 5–8)
PLATELET # BLD AUTO: 214 K/UL (ref 150–450)
PMV BLD AUTO: 10.1 FL (ref 9.2–12.9)
POC CARDIAC TROPONIN I: 0.01 NG/ML (ref 0–0.08)
POTASSIUM SERPL-SCNC: 3.6 MMOL/L (ref 3.5–5.1)
PROT SERPL-MCNC: 7.2 G/DL (ref 6–8.4)
PROT UR QL STRIP: NEGATIVE
RBC # BLD AUTO: 5.07 M/UL (ref 4–5.4)
RBC #/AREA URNS AUTO: 15 /HPF (ref 0–4)
SAMPLE: NORMAL
SODIUM SERPL-SCNC: 138 MMOL/L (ref 136–145)
SP GR UR STRIP: 1.01 (ref 1–1.03)
SQUAMOUS #/AREA URNS AUTO: 0 /HPF
TROPONIN I SERPL DL<=0.01 NG/ML-MCNC: 0.01 NG/ML (ref 0–0.03)
TROPONIN I SERPL DL<=0.01 NG/ML-MCNC: <0.006 NG/ML (ref 0–0.03)
URN SPEC COLLECT METH UR: ABNORMAL
WBC # BLD AUTO: 7.38 K/UL (ref 3.9–12.7)
WBC #/AREA URNS AUTO: 1 /HPF (ref 0–5)

## 2023-04-17 PROCEDURE — 93005 ELECTROCARDIOGRAM TRACING: CPT

## 2023-04-17 PROCEDURE — 86803 HEPATITIS C AB TEST: CPT | Performed by: PHYSICIAN ASSISTANT

## 2023-04-17 PROCEDURE — 85025 COMPLETE CBC W/AUTO DIFF WBC: CPT | Performed by: PHYSICIAN ASSISTANT

## 2023-04-17 PROCEDURE — 93010 ELECTROCARDIOGRAM REPORT: CPT | Mod: ,,, | Performed by: INTERNAL MEDICINE

## 2023-04-17 PROCEDURE — 80053 COMPREHEN METABOLIC PANEL: CPT | Performed by: PHYSICIAN ASSISTANT

## 2023-04-17 PROCEDURE — 99285 EMERGENCY DEPT VISIT HI MDM: CPT | Mod: 25

## 2023-04-17 PROCEDURE — 84484 ASSAY OF TROPONIN QUANT: CPT | Mod: 91 | Performed by: PHYSICIAN ASSISTANT

## 2023-04-17 PROCEDURE — 81001 URINALYSIS AUTO W/SCOPE: CPT | Performed by: PHYSICIAN ASSISTANT

## 2023-04-17 PROCEDURE — 83690 ASSAY OF LIPASE: CPT | Performed by: PHYSICIAN ASSISTANT

## 2023-04-17 PROCEDURE — 99284 PR EMERGENCY DEPT VISIT,LEVEL IV: ICD-10-PCS | Mod: ,,, | Performed by: PHYSICIAN ASSISTANT

## 2023-04-17 PROCEDURE — 25000003 PHARM REV CODE 250: Performed by: PHYSICIAN ASSISTANT

## 2023-04-17 PROCEDURE — 84484 ASSAY OF TROPONIN QUANT: CPT

## 2023-04-17 PROCEDURE — 93010 EKG 12-LEAD: ICD-10-PCS | Mod: ,,, | Performed by: INTERNAL MEDICINE

## 2023-04-17 PROCEDURE — 87389 HIV-1 AG W/HIV-1&-2 AB AG IA: CPT | Performed by: PHYSICIAN ASSISTANT

## 2023-04-17 PROCEDURE — 99284 EMERGENCY DEPT VISIT MOD MDM: CPT | Mod: ,,, | Performed by: PHYSICIAN ASSISTANT

## 2023-04-17 RX ORDER — SUCRALFATE 1 G/1
1 TABLET ORAL 4 TIMES DAILY PRN
Qty: 20 TABLET | Refills: 0 | Status: SHIPPED | OUTPATIENT
Start: 2023-04-17

## 2023-04-17 RX ORDER — MAG HYDROX/ALUMINUM HYD/SIMETH 200-200-20
30 SUSPENSION, ORAL (FINAL DOSE FORM) ORAL
Status: COMPLETED | OUTPATIENT
Start: 2023-04-17 | End: 2023-04-17

## 2023-04-17 RX ADMIN — ALUMINUM HYDROXIDE, MAGNESIUM HYDROXIDE, AND SIMETHICONE 30 ML: 200; 200; 20 SUSPENSION ORAL at 10:04

## 2023-04-17 NOTE — ED TRIAGE NOTES
Pt states 3 days ago she started having a burning sensatin to midsterum and radiates to left side chest and around to back and arm  States she has hx of hiatal hernia and acid reflux  States last week she cooked ground meat with brown gravy and noticed the buring sensation after eating the ground meat patties

## 2023-04-17 NOTE — ED PROVIDER NOTES
"Encounter Date: 4/17/2023       History     Chief Complaint   Patient presents with    Chest Pain     "Burning" sensation w/ radiation of left anterior thoracic down LUE. "I been doing a lot of burping and passing some gas." Hx of hiatal hernia and acid reflux.      73-year-old female with history of hypertension, hyperlipidemia, DM 1, hyperthyroidism, s/p cardiac ablation in 2014 presents to the ED complaining epigastric abdominal discomfort/lower sternal chest discomfort.  Her pain started Friday evening, has been an intermittent 2/10 "burning" that radiates across the left lower chest and down the left arm.  She has been taking pantoprazole at home with some improvement of her symptoms.  She has not taken any over-the-counter medications.  She does report some mild nausea without any episodes of emesis.  No past surgical history of the abdomen.  Denies fever, chills, shortness of breath, diaphoresis, diarrhea, dysuria, headache, lightheadedness.    The history is provided by the patient.   Review of patient's allergies indicates:   Allergen Reactions    Actonel [risedronate]      Other reaction(s): chest pain    Codeine Rash and Itching     Other reaction(s): Itching  Other reaction(s): Hives     Past Medical History:   Diagnosis Date    Cataract     Diabetes mellitus type I     Diabetic peripheral neuropathy 7/9/2012    HTN (hypertension) 7/9/2012    Hyperlipidemia 7/9/2012    Osteoporosis, post-menopausal 7/9/2012    Poorly controlled type 1 diabetes mellitus with peripheral neuropathy 7/9/2012    Supraventricular tachycardia 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    SVT (supraventricular tachycardia) 7/9/2012    Thyroid disease     hyperthyroidism    Venous insufficiency (chronic) (peripheral) 7/9/2012     Past Surgical History:   Procedure Laterality Date    BREAST BIOPSY Left 12/23/2016    core, FA    COLONOSCOPY  11/28/2016    DILATION AND CURETTAGE OF UTERUS      RADIOFREQUENCY ABLATION  06/17/2014    AVNRT    WISDOM TOOTH " EXTRACTION       Family History   Problem Relation Age of Onset    Heart disease Mother         valvular heart disease at 91    Hypertension Mother     Cataracts Mother     COPD Father     Glaucoma Father     Hypertension Sister     Glaucoma Sister     Allergies Sister     No Known Problems Brother     Hypertension Daughter     Fibroids Daughter     Hypertension Daughter     No Known Problems Daughter     Diabetes Maternal Aunt     Cataracts Maternal Aunt     No Known Problems Maternal Uncle     No Known Problems Paternal Aunt     No Known Problems Paternal Uncle     No Known Problems Maternal Grandmother     No Known Problems Maternal Grandfather     No Known Problems Paternal Grandmother     No Known Problems Paternal Grandfather     Amblyopia Neg Hx     Blindness Neg Hx     Cancer Neg Hx     Macular degeneration Neg Hx     Retinal detachment Neg Hx     Strabismus Neg Hx     Stroke Neg Hx     Thyroid disease Neg Hx     Ovarian cancer Neg Hx     Colon cancer Neg Hx     Breast cancer Neg Hx      Social History     Tobacco Use    Smoking status: Never    Smokeless tobacco: Never   Substance Use Topics    Alcohol use: No    Drug use: No     Review of Systems   Constitutional:  Negative for chills and fever.   HENT:  Negative for congestion.    Respiratory:  Negative for cough and shortness of breath.    Cardiovascular:  Positive for chest pain. Negative for leg swelling.   Gastrointestinal:  Positive for abdominal pain and nausea. Negative for diarrhea and vomiting.   Genitourinary:  Negative for dysuria and hematuria.   Musculoskeletal:  Negative for back pain.   Skin:  Negative for rash.   Neurological:  Negative for weakness, light-headedness and headaches.   Psychiatric/Behavioral:  Negative for confusion.      Physical Exam     Initial Vitals [04/17/23 0935]   BP Pulse Resp Temp SpO2   (!) 159/67 (!) 58 17 98.5 °F (36.9 °C) 100 %      MAP       --         Physical Exam    Nursing note and vitals  reviewed.  Constitutional: She appears well-developed and well-nourished.   HENT:   Head: Normocephalic and atraumatic.   Neck: Neck supple.   Normal range of motion.  Cardiovascular:  Normal rate, regular rhythm, normal heart sounds and intact distal pulses.     Exam reveals no gallop and no friction rub.       No murmur heard.  Pulmonary/Chest: Breath sounds normal. She has no wheezes. She has no rhonchi. She has no rales.   Abdominal: Abdomen is soft. Bowel sounds are normal. There is abdominal tenderness in the epigastric area. There is no rebound.   Musculoskeletal:         General: Normal range of motion.      Cervical back: Normal range of motion and neck supple.     Neurological: She is alert and oriented to person, place, and time.   Skin: Skin is warm and dry.   Psychiatric: She has a normal mood and affect.       ED Course   Procedures  Labs Reviewed   CBC W/ AUTO DIFFERENTIAL - Abnormal; Notable for the following components:       Result Value    Gran % 76.4 (*)     Lymph % 15.4 (*)     All other components within normal limits   COMPREHENSIVE METABOLIC PANEL - Abnormal; Notable for the following components:    Glucose 154 (*)     All other components within normal limits   URINALYSIS, REFLEX TO URINE CULTURE - Abnormal; Notable for the following components:    Color, UA Colorless (*)     Occult Blood UA 1+ (*)     All other components within normal limits    Narrative:     Specimen Source->Urine   URINALYSIS MICROSCOPIC - Abnormal; Notable for the following components:    RBC, UA 15 (*)     All other components within normal limits    Narrative:     Specimen Source->Urine   HIV 1 / 2 ANTIBODY    Narrative:     Release to patient->Immediate   HEPATITIS C ANTIBODY    Narrative:     Release to patient->Immediate   TROPONIN I   LIPASE   TROPONIN ISTAT   TROPONIN I        ECG Results              EKG 12-lead (Final result)  Result time 04/17/23 11:14:56      Final result by Interface, Lab In [facility]  (04/17/23 11:14:56)                   Narrative:    Test Reason : R07.9,    Vent. Rate : 055 BPM     Atrial Rate : 055 BPM     P-R Int : 138 ms          QRS Dur : 068 ms      QT Int : 440 ms       P-R-T Axes : 062 039 043 degrees     QTc Int : 420 ms    Sinus bradycardia  Possible Left atrial enlargement  Septal infarct (cited on or before 17-JUN-2014)  Abnormal ECG  When compared with ECG of 28-AUG-2015 09:33,  No significant change was found  Confirmed by Angelina Rivero MD ([id number]) on 4/17/2023 11:14:46 AM    Referred By: LYN   SELF           Confirmed By:Angelina Rivero MD                                  Imaging Results              X-Ray Chest PA And Lateral (Final result)  Result time 04/17/23 10:48:06      Final result by Luigi Pendleton MD (04/17/23 10:48:06)                   Impression:      See above      Electronically signed by: Luigi Pendleton MD  Date:    04/17/2023  Time:    10:48               Narrative:    EXAMINATION:  XR CHEST PA AND LATERAL    CLINICAL HISTORY:  Chest Pain;    TECHNIQUE:  PA and lateral views of the chest were performed.    COMPARISON:  None    FINDINGS:  Heart size normal.  The lungs are clear.  No pleural effusion                                       Medications   aluminum-magnesium hydroxide-simethicone 200-200-20 mg/5 mL suspension 30 mL (30 mLs Oral Given 4/17/23 1048)     Medical Decision Making:   History:   Old Medical Records: I decided to obtain old medical records.  Old Records Summarized: records from clinic visits and records from previous admission(s).       <> Summary of Records: Cardiac ablation in 2014. Followed by cardiology, Dr Santana  Clinical Tests:   Lab Tests: Ordered and Reviewed  Radiological Study: Ordered and Reviewed  Medical Tests: Reviewed and Ordered     APC / Resident Notes:   73-year-old female with history of hypertension, hyperlipidemia, DM 1, hyperthyroidism, s/p cardiac ablation in 2014 presents to the ED complaining epigastric abdominal  discomfort/lower sternal chest discomfort.  Hypertensive with /67, took BP medications last night, does not normally take any medications in the morning.  She is well-appearing.  Regular rate and rhythm.  Lungs are clear.  Abdomen is soft, minimally tender in the epigastric region.  No right upper quadrant tenderness.  Distal pulses intact.  Normal sensation to the upper extremities.  Differential diagnosis includes but isn't limited to ACS, pancreatitis, gastritis, UTI, musculoskeletal pain, pneumonia.  I do not suspect aortic dissection.  Will get labs, chest x-ray. Will give maalox and reassess.     EKG unchanged from prior.     UA with no infection. No leukocytosis or anemia. CMP unremarkable. Lipase normal. Troponin x 2 normal.     CXR independently reviewed - no focal consolidation.     Re-assessment, no current chest pain or chest burning. Symptoms likely GERD/gastritis. States she stopped taking her protonix for a period of time and thinks that meat with sauce made her symptoms worse. I do not feel that she needs any further labs or imaging at this time. Stable for discharge.     She was discharged with a prescription for carafate.  She will follow up with her PCP.  Strict ED return precautions given.  All of the patient's questions were answered.  I reviewed the patient's chart, labs, and imaging and discussed the case with my supervising physician.          Attending Attestation:     Physician Attestation Statement for NP/PA:   I discussed this assessment and plan of this patient with the NP/PA, but I did not personally examine the patient. The face to face encounter was performed by the NP/PA.            ED Course as of 04/18/23 1106   Mon Apr 17, 2023   1129 Troponin I: 0.007 [CD]   1129 Hemoglobin: 14.1 [CD]   1129 Lipase: 14 [CD]      ED Course User Index  [CD] Bia Perez PA-C                 Clinical Impression:   Final diagnoses:  [R07.9] Chest pain  [R10.13] Epigastric abdominal pain  (Primary)        ED Disposition Condition    Discharge Stable          ED Prescriptions       Medication Sig Dispense Start Date End Date Auth. Provider    sucralfate (CARAFATE) 1 gram tablet Take 1 tablet (1 g total) by mouth 4 (four) times daily as needed (abdominal pain). 20 tablet 4/17/2023 -- Bia Perez PA-C          Follow-up Information       Follow up With Specialties Details Why Contact Info    Venu Ford MD Internal Medicine   [address] [facility] [address]  [phone]               Bia Perez PA-C  04/17/23 1536       Kirk Ramirez MD  04/18/23 4118

## 2023-04-19 ENCOUNTER — TELEPHONE (OUTPATIENT)
Dept: INTERNAL MEDICINE | Facility: CLINIC | Age: 73
End: 2023-04-19
Payer: MEDICARE

## 2023-04-19 NOTE — TELEPHONE ENCOUNTER
"Called pt to inform her per Dr. Ford "Ok to take protonix with carafate."    Pt verbalized understanding and had no further concerns or questions.    "

## 2023-04-19 NOTE — TELEPHONE ENCOUNTER
"Called pt as first attempt  Spoke to her  Scheduled her sooner appt shikha/ Josefina   She said that they put her on sucralfate and she asked why they put "ab pain" in the instructions since she was having a burning sensation   I let her know that pain can include the burning sensation    She asked if she has to take it four times a day and I said per the instructions, only take as need, up to four times a day      She also asked if she can still take pantoprazole with it   I let her know I will ask Dr. Ford and call her back    Pt verbalized understanding and had no further concerns or questions.    "

## 2023-04-19 NOTE — TELEPHONE ENCOUNTER
----- Message from Debi Vargas sent at 4/19/2023 11:02 AM CDT -----  Regarding: Appt Request  Contact: LICHA ALVAREZ [[id number]]  Name of Who is Calling:  Licha Alvarez            What is the request in detail: Pt states she was advised to make a hospital f/u appt , the next avail isn't until  5/30. She declined to see another provider. She states they put her on another rx and she wants to consult in regards, Please advise           Can the clinic reply by [facility]:No          What Number to Call Back if not in [facility]: Home Phone      [phone]  Work Phone      Not on file.  Mobile          [phone]

## 2023-04-25 ENCOUNTER — OFFICE VISIT (OUTPATIENT)
Dept: INTERNAL MEDICINE | Facility: CLINIC | Age: 73
End: 2023-04-25
Payer: MEDICARE

## 2023-04-25 VITALS
HEIGHT: 64 IN | SYSTOLIC BLOOD PRESSURE: 122 MMHG | WEIGHT: 114.63 LBS | BODY MASS INDEX: 19.57 KG/M2 | DIASTOLIC BLOOD PRESSURE: 66 MMHG

## 2023-04-25 DIAGNOSIS — K21.9 GASTROESOPHAGEAL REFLUX DISEASE, UNSPECIFIED WHETHER ESOPHAGITIS PRESENT: Primary | ICD-10-CM

## 2023-04-25 PROCEDURE — 3074F SYST BP LT 130 MM HG: CPT | Mod: CPTII,S$GLB,, | Performed by: PHYSICIAN ASSISTANT

## 2023-04-25 PROCEDURE — 3061F NEG MICROALBUMINURIA REV: CPT | Mod: CPTII,S$GLB,, | Performed by: PHYSICIAN ASSISTANT

## 2023-04-25 PROCEDURE — 1126F AMNT PAIN NOTED NONE PRSNT: CPT | Mod: CPTII,S$GLB,, | Performed by: PHYSICIAN ASSISTANT

## 2023-04-25 PROCEDURE — 3074F PR MOST RECENT SYSTOLIC BLOOD PRESSURE < 130 MM HG: ICD-10-PCS | Mod: CPTII,S$GLB,, | Performed by: PHYSICIAN ASSISTANT

## 2023-04-25 PROCEDURE — 99999 PR PBB SHADOW E&M-EST. PATIENT-LVL III: ICD-10-PCS | Mod: PBBFAC,,, | Performed by: PHYSICIAN ASSISTANT

## 2023-04-25 PROCEDURE — 3061F PR NEG MICROALBUMINURIA RESULT DOCUMENTED/REVIEW: ICD-10-PCS | Mod: CPTII,S$GLB,, | Performed by: PHYSICIAN ASSISTANT

## 2023-04-25 PROCEDURE — 1126F PR PAIN SEVERITY QUANTIFIED, NO PAIN PRESENT: ICD-10-PCS | Mod: CPTII,S$GLB,, | Performed by: PHYSICIAN ASSISTANT

## 2023-04-25 PROCEDURE — 99214 OFFICE O/P EST MOD 30 MIN: CPT | Mod: S$GLB,,, | Performed by: PHYSICIAN ASSISTANT

## 2023-04-25 PROCEDURE — 4010F ACE/ARB THERAPY RXD/TAKEN: CPT | Mod: CPTII,S$GLB,, | Performed by: PHYSICIAN ASSISTANT

## 2023-04-25 PROCEDURE — 3051F HG A1C>EQUAL 7.0%<8.0%: CPT | Mod: CPTII,S$GLB,, | Performed by: PHYSICIAN ASSISTANT

## 2023-04-25 PROCEDURE — 1101F PT FALLS ASSESS-DOCD LE1/YR: CPT | Mod: CPTII,S$GLB,, | Performed by: PHYSICIAN ASSISTANT

## 2023-04-25 PROCEDURE — 3078F DIAST BP <80 MM HG: CPT | Mod: CPTII,S$GLB,, | Performed by: PHYSICIAN ASSISTANT

## 2023-04-25 PROCEDURE — 3288F FALL RISK ASSESSMENT DOCD: CPT | Mod: CPTII,S$GLB,, | Performed by: PHYSICIAN ASSISTANT

## 2023-04-25 PROCEDURE — 3288F PR FALLS RISK ASSESSMENT DOCUMENTED: ICD-10-PCS | Mod: CPTII,S$GLB,, | Performed by: PHYSICIAN ASSISTANT

## 2023-04-25 PROCEDURE — 1159F MED LIST DOCD IN RCRD: CPT | Mod: CPTII,S$GLB,, | Performed by: PHYSICIAN ASSISTANT

## 2023-04-25 PROCEDURE — 3008F BODY MASS INDEX DOCD: CPT | Mod: CPTII,S$GLB,, | Performed by: PHYSICIAN ASSISTANT

## 2023-04-25 PROCEDURE — 99214 PR OFFICE/OUTPT VISIT, EST, LEVL IV, 30-39 MIN: ICD-10-PCS | Mod: S$GLB,,, | Performed by: PHYSICIAN ASSISTANT

## 2023-04-25 PROCEDURE — 1159F PR MEDICATION LIST DOCUMENTED IN MEDICAL RECORD: ICD-10-PCS | Mod: CPTII,S$GLB,, | Performed by: PHYSICIAN ASSISTANT

## 2023-04-25 PROCEDURE — 3078F PR MOST RECENT DIASTOLIC BLOOD PRESSURE < 80 MM HG: ICD-10-PCS | Mod: CPTII,S$GLB,, | Performed by: PHYSICIAN ASSISTANT

## 2023-04-25 PROCEDURE — 3066F NEPHROPATHY DOC TX: CPT | Mod: CPTII,S$GLB,, | Performed by: PHYSICIAN ASSISTANT

## 2023-04-25 PROCEDURE — 4010F PR ACE/ARB THEARPY RXD/TAKEN: ICD-10-PCS | Mod: CPTII,S$GLB,, | Performed by: PHYSICIAN ASSISTANT

## 2023-04-25 PROCEDURE — 99999 PR PBB SHADOW E&M-EST. PATIENT-LVL III: CPT | Mod: PBBFAC,,, | Performed by: PHYSICIAN ASSISTANT

## 2023-04-25 PROCEDURE — 3066F PR DOCUMENTATION OF TREATMENT FOR NEPHROPATHY: ICD-10-PCS | Mod: CPTII,S$GLB,, | Performed by: PHYSICIAN ASSISTANT

## 2023-04-25 PROCEDURE — 3051F PR MOST RECENT HEMOGLOBIN A1C LEVEL 7.0 - < 8.0%: ICD-10-PCS | Mod: CPTII,S$GLB,, | Performed by: PHYSICIAN ASSISTANT

## 2023-04-25 PROCEDURE — 3008F PR BODY MASS INDEX (BMI) DOCUMENTED: ICD-10-PCS | Mod: CPTII,S$GLB,, | Performed by: PHYSICIAN ASSISTANT

## 2023-04-25 PROCEDURE — 1101F PR PT FALLS ASSESS DOC 0-1 FALLS W/OUT INJ PAST YR: ICD-10-PCS | Mod: CPTII,S$GLB,, | Performed by: PHYSICIAN ASSISTANT

## 2023-04-25 NOTE — PROGRESS NOTES
INTERNAL MEDICINE URGENT VISIT NOTE    CHIEF COMPLAINT     Chief Complaint   Patient presents with    Follow-up       HPI     Sarita Alvarez is a 73 y.o. female who presents for an urgent visit today.    PCP is Venu Ford MD, patient is new to me.     Patient presents with complaints of hospital follow-up.  She was seen in the emergency department for epigastric burning sensation that wrapped around to the left side of her chest.  She does report that she had a significant dietary change in the last week and has been eating brown gravy and mashed potatoes which she thinks could be contributing to acid reflux worsening.     ER workup was reassuring with negative troponin x3, EKG and chest x-ray showed no acute abnormalities.  Patient has been taking Carafate and Protonix with good result.  Symptoms are now resolved.  No further workup for warranted at this time.  Patient encouraged to practice bland diet and to return to clinic if any symptoms change or worsen.  She is amenable to plan and verbalized understanding.  She is aware of ED prompts.    Past Medical History:  Past Medical History:   Diagnosis Date    Cataract     Diabetes mellitus type I     Diabetic peripheral neuropathy 7/9/2012    HTN (hypertension) 7/9/2012    Hyperlipidemia 7/9/2012    Osteoporosis, post-menopausal 7/9/2012    Poorly controlled type 1 diabetes mellitus with peripheral neuropathy 7/9/2012    Supraventricular tachycardia 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    SVT (supraventricular tachycardia) 7/9/2012    Thyroid disease     hyperthyroidism    Venous insufficiency (chronic) (peripheral) 7/9/2012       Home Medications:  Prior to Admission medications    Medication Sig Start Date End Date Taking? Authorizing Provider   amLODIPine (NORVASC) 5 MG tablet Take 1 tablet by mouth once daily 9/19/22  Yes Venu Ford MD   aspirin 81 mg Tab Take 81 mg by mouth. 1 Tablet Oral Every day   Yes Historical Provider   blood sugar diagnostic Strp 1  "each by Misc.(Non-Drug; Combo Route) route 4 (four) times daily. 7/27/18  Yes Venu Ford MD   CALCIUM CARBONATE/VITAMIN D3 (CALCIUM+D ORAL) Take by mouth. 1 Capsule Oral Twice a day   Pt taking once daily   Yes Historical Provider   enalapril (VASOTEC) 20 MG tablet Take 1 tablet by mouth once daily 1/17/23  Yes Venu Ford MD   fluticasone (FLONASE) 50 mcg/actuation nasal spray USE TWO SPRAY(S) IN EACH NOSTRIL ONCE DAILY 2/12/19  Yes Venu Ford MD   hydroCHLOROthiazide (HYDRODIURIL) 25 MG tablet Take 1 tablet by mouth once daily 10/6/22  Yes Venu Ford MD   insulin degludec (TRESIBA FLEXTOUCH U-100) 100 unit/mL (3 mL) insulin pen INJECT 15 UNITS SUBCUTANEOUSLY IN THE EVENING 9/16/22  Yes César Osman MD   insulin lispro 100 unit/mL pen INJECT 6 UNITS SUBCUTANEOUSLY THREE TIMES DAILY WITH MEALS and adjust per sliding scale 1/20/22  Yes Ronald Koch MD   insulin needles, disposable, (BD ULTRA-FINE MARIA D PEN NEEDLES) 32 x 5/32 " Ndle 4 Sticks by Misc.(Non-Drug; Combo Route) route Daily. 12/26/13  Yes Celeste Jeffery, DNP, NP   lancets Misc 1 each by Misc.(Non-Drug; Combo Route) route 4 (four) times daily. 3/18/16  Yes Venu Ford MD   loratadine (CLARITIN) 10 mg tablet Take 10 mg by mouth daily as needed for Allergies.   Yes Historical Provider   methIMAzole (TAPAZOLE) 5 MG Tab Take 1/2 (one-half) tablet by mouth once daily 1/23/23  Yes Ronald Koch MD   metoprolol succinate (TOPROL-XL) 25 MG 24 hr tablet Take 1 tablet by mouth once daily 10/12/22  Yes Venu Ford MD   pantoprazole (PROTONIX) 40 MG tablet Take 1 tablet (40 mg total) by mouth once daily.  Patient taking differently: Take 40 mg by mouth once daily. Taking PRN 8/5/21  Yes Venu Ford MD   simvastatin (ZOCOR) 20 MG tablet Take 1 tablet (20 mg total) by mouth once daily. 9/16/22  Yes César Osman MD   sucralfate (CARAFATE) 1 gram tablet Take 1 tablet (1 g total) by mouth 4 " (four) times daily as needed (abdominal pain). 4/17/23  Yes Bia Perez PA-C   blood-glucose meter kit Use as instructed 3/18/16 9/23/22  Venu Ford MD   blood-glucose sensor (DEXCOM G6 SENSOR) Galina 3 each by Misc.(Non-Drug; Combo Route) route every 10 days. 1/21/22 1/21/23  Ronald Koch MD   blood-glucose transmitter (DEXCOM G6 TRANSMITTER) Galina 1 each by Misc.(Non-Drug; Combo Route) route every 3 (three) months. 1/21/22 1/21/23  Ronald Koch MD   glucagon, human recombinant, (GLUCAGON EMERGENCY KIT, HUMAN,) 1 mg SolR Inject 1 mg into the muscle as needed (for severe hypoglycemia).  Patient not taking: Reported on 12/3/2021 6/17/20 6/17/21  Ronald Koch MD       Review of Systems:  Review of Systems   Constitutional:  Negative for chills and fever.   HENT:  Negative for sore throat and trouble swallowing.    Eyes:  Negative for visual disturbance.   Respiratory:  Negative for cough and shortness of breath.    Cardiovascular:  Negative for chest pain.   Gastrointestinal:  Negative for abdominal pain, constipation, diarrhea, nausea and vomiting.   Genitourinary:  Negative for dysuria and flank pain.   Musculoskeletal:  Negative for back pain, neck pain and neck stiffness.   Skin:  Negative for rash.   Neurological:  Negative for dizziness, syncope, weakness and headaches.   Psychiatric/Behavioral:  Negative for confusion.      Health Maintainence:   Immunizations:  Health Maintenance         Date Due Completion Date    TETANUS VACCINE Never done ---    Shingles Vaccine (2 of 3) 03/02/2016 1/6/2016    DEXA Scan 06/04/2023 6/4/2020    Hemoglobin A1c 08/18/2023 2/18/2023    Mammogram 10/13/2023 10/13/2022    Diabetes Urine Screening 02/18/2024 2/18/2023    Lipid Panel 02/18/2024 2/18/2023    Eye Exam 02/20/2024 2/20/2023    Foot Exam 03/03/2024 3/3/2023    Override on 1/29/2016: Done    Override on 11/28/2014: Done    High Dose Statin 04/17/2024 4/17/2023    Colorectal Cancer Screening 12/02/2026  "12/2/2016             PHYSICAL EXAM     /66 (BP Location: Left arm, Patient Position: Sitting, BP Method: Medium (Automatic))   Ht 5' 4" (1.626 m)   Wt 52 kg (114 lb 10.2 oz)   BMI 19.68 kg/m²     Physical Exam  Vitals and nursing note reviewed.   Constitutional:       Appearance: Normal appearance.      Comments: Healthy appearing female in NAD or apparent pain. She makes good eye contact, speaks in clear full sentences and ambulates with ease.        HENT:      Head: Normocephalic and atraumatic.      Nose: Nose normal.      Mouth/Throat:      Pharynx: Oropharynx is clear.   Eyes:      Conjunctiva/sclera: Conjunctivae normal.   Cardiovascular:      Rate and Rhythm: Normal rate and regular rhythm.      Pulses: Normal pulses.   Pulmonary:      Effort: No respiratory distress.   Abdominal:      Tenderness: There is no abdominal tenderness.   Musculoskeletal:         General: Normal range of motion.      Cervical back: No rigidity.   Skin:     General: Skin is warm and dry.      Capillary Refill: Capillary refill takes less than 2 seconds.      Findings: No rash.   Neurological:      General: No focal deficit present.      Mental Status: She is alert.      Gait: Gait normal.   Psychiatric:         Mood and Affect: Mood normal.       LABS     Lab Results   Component Value Date    HGBA1C 7.8 (H) 02/18/2023     CMP  Sodium   Date Value Ref Range Status   04/17/2023 138 136 - 145 mmol/L Final     Potassium   Date Value Ref Range Status   04/17/2023 3.6 3.5 - 5.1 mmol/L Final     Chloride   Date Value Ref Range Status   04/17/2023 101 95 - 110 mmol/L Final     CO2   Date Value Ref Range Status   04/17/2023 27 23 - 29 mmol/L Final     Glucose   Date Value Ref Range Status   04/17/2023 154 (H) 70 - 110 mg/dL Final     BUN   Date Value Ref Range Status   04/17/2023 21 8 - 23 mg/dL Final     Creatinine   Date Value Ref Range Status   04/17/2023 0.8 0.5 - 1.4 mg/dL Final     Calcium   Date Value Ref Range Status "   04/17/2023 10.0 8.7 - 10.5 mg/dL Final     Total Protein   Date Value Ref Range Status   04/17/2023 7.2 6.0 - 8.4 g/dL Final     Albumin   Date Value Ref Range Status   04/17/2023 4.0 3.5 - 5.2 g/dL Final     Total Bilirubin   Date Value Ref Range Status   04/17/2023 0.4 0.1 - 1.0 mg/dL Final     Comment:     For infants and newborns, interpretation of results should be based  on gestational age, weight and in agreement with clinical  observations.    Premature Infant recommended reference ranges:  Up to 24 hours.............<8.0 mg/dL  Up to 48 hours............<12.0 mg/dL  3-5 days..................<15.0 mg/dL  6-29 days.................<15.0 mg/dL       Alkaline Phosphatase   Date Value Ref Range Status   04/17/2023 74 55 - 135 U/L Final     AST   Date Value Ref Range Status   04/17/2023 17 10 - 40 U/L Final     ALT   Date Value Ref Range Status   04/17/2023 16 10 - 44 U/L Final     Anion Gap   Date Value Ref Range Status   04/17/2023 10 8 - 16 mmol/L Final     eGFR if    Date Value Ref Range Status   07/13/2022 >60.0 >60 mL/min/1.73 m^2 Final     eGFR if non    Date Value Ref Range Status   07/13/2022 >60.0 >60 mL/min/1.73 m^2 Final     Comment:     Calculation used to obtain the estimated glomerular filtration  rate (eGFR) is the CKD-EPI equation.        Lab Results   Component Value Date    WBC 7.38 04/17/2023    HGB 14.1 04/17/2023    HCT 43.4 04/17/2023    MCV 86 04/17/2023     04/17/2023     Lab Results   Component Value Date    CHOL 157 02/18/2023    CHOL 146 11/26/2021    CHOL 160 01/19/2021     Lab Results   Component Value Date    HDL 78 (H) 02/18/2023    HDL 72 11/26/2021    HDL 71 01/19/2021     Lab Results   Component Value Date    LDLCALC 70.2 02/18/2023    LDLCALC 67.6 11/26/2021    LDLCALC 81.4 01/19/2021     Lab Results   Component Value Date    TRIG 44 02/18/2023    TRIG 32 11/26/2021    TRIG 38 01/19/2021     Lab Results   Component Value Date    CHOLHDL  49.7 02/18/2023    CHOLHDL 49.3 11/26/2021    CHOLHDL 44.4 01/19/2021     Lab Results   Component Value Date    TSH 1.765 05/10/2022    TSH 1.765 05/10/2022       ASSESSMENT/PLAN     Sarita Alvarez is a 73 y.o. female     Sarita was seen today for follow-up. Continue Protonix and Carafate as prescribed. If symptoms change or worsen despite current mgmt, low threshold to get exercise stress test for further reassurance that sx has gastric and not cardiac etiology.     Diagnoses and all orders for this visit:    Gastroesophageal reflux disease, unspecified whether esophagitis present       Patient was counseled on when and how to seek emergent care.       Josefina Evans PA-C   Department of Internal Medicine - [facility]   9:42 AM

## 2023-05-03 NOTE — DISCHARGE INSTRUCTIONS
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 You need to follow up with a vascular surgeon regarding this.  It is the likely cause of microscopic blood in your urine.  Your CT scan was negative for appendicitis.    Seek immediate medical attention if you develop fever, worsening pain, or for any other concern.  
Resp. monitored floor bed

## 2023-05-09 ENCOUNTER — TELEPHONE (OUTPATIENT)
Dept: INTERNAL MEDICINE | Facility: CLINIC | Age: 73
End: 2023-05-09
Payer: MEDICARE

## 2023-05-09 DIAGNOSIS — E10.42 DIABETIC PERIPHERAL NEUROPATHY ASSOCIATED WITH TYPE 1 DIABETES MELLITUS: ICD-10-CM

## 2023-05-09 RX ORDER — INSULIN LISPRO 100 [IU]/ML
INJECTION, SOLUTION INTRAVENOUS; SUBCUTANEOUS
Qty: 15 ML | Refills: 0 | Status: SHIPPED | OUTPATIENT
Start: 2023-05-09 | End: 2023-06-13 | Stop reason: SDUPTHER

## 2023-05-09 NOTE — TELEPHONE ENCOUNTER
Refill Encounter    PCP Visits: Recent Visits  Date Type Provider Dept   03/03/23 Office Visit Venu Ford MD [facility] Internal Medicine   08/23/22 Office Visit Venu Ford MD [facility] Internal Medicine   Showing recent visits within past 360 days and meeting all other requirements  Future Appointments  Date Type Provider Dept   09/08/23 Appointment Venu Ford MD [facility] Internal Medicine   Showing future appointments within next 720 days and meeting all other requirements     Last 3 Blood Pressure:   BP Readings from Last 3 Encounters:   04/25/23 122/66   04/17/23 (!) 154/64   03/23/23 (!) 147/65     Preferred Pharmacy:   [facility] [address] [place] [address] - [address]  [address] [facility] [address]  Phone: [phone] Fax: [phone]    Requested RX:  Requested Prescriptions     Pending Prescriptions Disp Refills    insulin lispro 100 unit/mL pen [Pharmacy Med Name: Insulin Lispro (1 Unit Dial) 100 UNIT/ML Subcutaneous Solution Pen-injector] 15 mL 0     Sig: INJECT 6 UNITS INTO THE SKIN 3 TIMES DAILY WITH MEALS AND ADJUST PER SLIDING SCALE      RX Route: Normal

## 2023-05-09 NOTE — TELEPHONE ENCOUNTER
----- Message from Delma Bird MA sent at 5/9/2023  9:21 AM CDT -----  Regarding: Refill Request  Who Called: LICHA CHENEY [[id number]]           New Prescription or Refill : Refill      RX Name and Strength:  insulin lispro 100 unit/mL pen           Local or Mail Order : local            Preferred Pharmacy: [facility] [address] [place] [address] Angelia Beltre      Would the patient rather a call back or a response via [facility]? call        Best Call Back Number:  [phone]

## 2023-05-16 ENCOUNTER — TELEPHONE (OUTPATIENT)
Dept: ADMINISTRATIVE | Facility: CLINIC | Age: 73
End: 2023-05-16
Payer: MEDICARE

## 2023-05-16 NOTE — TELEPHONE ENCOUNTER
Called pt, informed pt I was calling to remind pt of her in office EAWV on 5/18/23; clinic location provided to patient; pt confirmed appointment

## 2023-05-18 ENCOUNTER — TELEPHONE (OUTPATIENT)
Dept: INTERNAL MEDICINE | Facility: CLINIC | Age: 73
End: 2023-05-18

## 2023-05-18 ENCOUNTER — OFFICE VISIT (OUTPATIENT)
Dept: INTERNAL MEDICINE | Facility: CLINIC | Age: 73
End: 2023-05-18
Payer: MEDICARE

## 2023-05-18 VITALS
HEIGHT: 64 IN | WEIGHT: 114 LBS | DIASTOLIC BLOOD PRESSURE: 66 MMHG | SYSTOLIC BLOOD PRESSURE: 134 MMHG | BODY MASS INDEX: 19.46 KG/M2

## 2023-05-18 DIAGNOSIS — M81.0 OSTEOPOROSIS, POST-MENOPAUSAL: Chronic | ICD-10-CM

## 2023-05-18 DIAGNOSIS — H25.9 AGE-RELATED CATARACT OF BOTH EYES, UNSPECIFIED AGE-RELATED CATARACT TYPE: ICD-10-CM

## 2023-05-18 DIAGNOSIS — G89.29 CHRONIC RIGHT SHOULDER PAIN: ICD-10-CM

## 2023-05-18 DIAGNOSIS — I70.0 AORTIC ATHEROSCLEROSIS: ICD-10-CM

## 2023-05-18 DIAGNOSIS — Z98.890 STATUS POST CATHETER ABLATION OF SLOW PATHWAY: ICD-10-CM

## 2023-05-18 DIAGNOSIS — E05.90 HYPERTHYROIDISM: ICD-10-CM

## 2023-05-18 DIAGNOSIS — E10.42 DIABETIC PERIPHERAL NEUROPATHY ASSOCIATED WITH TYPE 1 DIABETES MELLITUS: ICD-10-CM

## 2023-05-18 DIAGNOSIS — M25.511 CHRONIC RIGHT SHOULDER PAIN: ICD-10-CM

## 2023-05-18 DIAGNOSIS — I10 ESSENTIAL HYPERTENSION: Chronic | ICD-10-CM

## 2023-05-18 DIAGNOSIS — E06.3 HASHIMOTO'S DISEASE: ICD-10-CM

## 2023-05-18 DIAGNOSIS — E78.2 MIXED HYPERLIPIDEMIA: Chronic | ICD-10-CM

## 2023-05-18 DIAGNOSIS — I47.10 SUPRAVENTRICULAR TACHYCARDIA: ICD-10-CM

## 2023-05-18 DIAGNOSIS — Z00.00 ENCOUNTER FOR PREVENTIVE HEALTH EXAMINATION: Primary | ICD-10-CM

## 2023-05-18 DIAGNOSIS — S16.1XXA STRAIN OF NECK MUSCLE, INITIAL ENCOUNTER: ICD-10-CM

## 2023-05-18 DIAGNOSIS — I87.2 VENOUS INSUFFICIENCY (CHRONIC) (PERIPHERAL): Chronic | ICD-10-CM

## 2023-05-18 DIAGNOSIS — E13.9 LATENT AUTOIMMUNE DIABETES IN ADULTS (LADA), MANAGED AS TYPE 1: ICD-10-CM

## 2023-05-18 DIAGNOSIS — Z86.79 STATUS POST CATHETER ABLATION OF SLOW PATHWAY: ICD-10-CM

## 2023-05-18 PROCEDURE — 3288F PR FALLS RISK ASSESSMENT DOCUMENTED: ICD-10-PCS | Mod: CPTII,,, | Performed by: NURSE PRACTITIONER

## 2023-05-18 PROCEDURE — 3078F PR MOST RECENT DIASTOLIC BLOOD PRESSURE < 80 MM HG: ICD-10-PCS | Mod: CPTII,,, | Performed by: NURSE PRACTITIONER

## 2023-05-18 PROCEDURE — 1101F PR PT FALLS ASSESS DOC 0-1 FALLS W/OUT INJ PAST YR: ICD-10-PCS | Mod: CPTII,,, | Performed by: NURSE PRACTITIONER

## 2023-05-18 PROCEDURE — 4010F PR ACE/ARB THEARPY RXD/TAKEN: ICD-10-PCS | Mod: CPTII,,, | Performed by: NURSE PRACTITIONER

## 2023-05-18 PROCEDURE — 1160F RVW MEDS BY RX/DR IN RCRD: CPT | Mod: CPTII,,, | Performed by: NURSE PRACTITIONER

## 2023-05-18 PROCEDURE — 99999 PR PBB SHADOW E&M-EST. PATIENT-LVL IV: ICD-10-PCS | Mod: PBBFAC,,, | Performed by: NURSE PRACTITIONER

## 2023-05-18 PROCEDURE — 3066F NEPHROPATHY DOC TX: CPT | Mod: CPTII,,, | Performed by: NURSE PRACTITIONER

## 2023-05-18 PROCEDURE — 1170F PR FUNCTIONAL STATUS ASSESSED: ICD-10-PCS | Mod: CPTII,,, | Performed by: NURSE PRACTITIONER

## 2023-05-18 PROCEDURE — G0439 PR MEDICARE ANNUAL WELLNESS SUBSEQUENT VISIT: ICD-10-PCS | Mod: ,,, | Performed by: NURSE PRACTITIONER

## 2023-05-18 PROCEDURE — 3075F SYST BP GE 130 - 139MM HG: CPT | Mod: CPTII,,, | Performed by: NURSE PRACTITIONER

## 2023-05-18 PROCEDURE — 1101F PT FALLS ASSESS-DOCD LE1/YR: CPT | Mod: CPTII,,, | Performed by: NURSE PRACTITIONER

## 2023-05-18 PROCEDURE — 1159F MED LIST DOCD IN RCRD: CPT | Mod: CPTII,,, | Performed by: NURSE PRACTITIONER

## 2023-05-18 PROCEDURE — 3078F DIAST BP <80 MM HG: CPT | Mod: CPTII,,, | Performed by: NURSE PRACTITIONER

## 2023-05-18 PROCEDURE — 3288F FALL RISK ASSESSMENT DOCD: CPT | Mod: CPTII,,, | Performed by: NURSE PRACTITIONER

## 2023-05-18 PROCEDURE — 4010F ACE/ARB THERAPY RXD/TAKEN: CPT | Mod: CPTII,,, | Performed by: NURSE PRACTITIONER

## 2023-05-18 PROCEDURE — 99999 PR PBB SHADOW E&M-EST. PATIENT-LVL IV: CPT | Mod: PBBFAC,,, | Performed by: NURSE PRACTITIONER

## 2023-05-18 PROCEDURE — 3066F PR DOCUMENTATION OF TREATMENT FOR NEPHROPATHY: ICD-10-PCS | Mod: CPTII,,, | Performed by: NURSE PRACTITIONER

## 2023-05-18 PROCEDURE — 3051F HG A1C>EQUAL 7.0%<8.0%: CPT | Mod: CPTII,,, | Performed by: NURSE PRACTITIONER

## 2023-05-18 PROCEDURE — 3008F BODY MASS INDEX DOCD: CPT | Mod: CPTII,,, | Performed by: NURSE PRACTITIONER

## 2023-05-18 PROCEDURE — 3061F NEG MICROALBUMINURIA REV: CPT | Mod: CPTII,,, | Performed by: NURSE PRACTITIONER

## 2023-05-18 PROCEDURE — 3075F PR MOST RECENT SYSTOLIC BLOOD PRESS GE 130-139MM HG: ICD-10-PCS | Mod: CPTII,,, | Performed by: NURSE PRACTITIONER

## 2023-05-18 PROCEDURE — 3061F PR NEG MICROALBUMINURIA RESULT DOCUMENTED/REVIEW: ICD-10-PCS | Mod: CPTII,,, | Performed by: NURSE PRACTITIONER

## 2023-05-18 PROCEDURE — 1160F PR REVIEW ALL MEDS BY PRESCRIBER/CLIN PHARMACIST DOCUMENTED: ICD-10-PCS | Mod: CPTII,,, | Performed by: NURSE PRACTITIONER

## 2023-05-18 PROCEDURE — 1126F AMNT PAIN NOTED NONE PRSNT: CPT | Mod: CPTII,,, | Performed by: NURSE PRACTITIONER

## 2023-05-18 PROCEDURE — 1170F FXNL STATUS ASSESSED: CPT | Mod: CPTII,,, | Performed by: NURSE PRACTITIONER

## 2023-05-18 PROCEDURE — G0439 PPPS, SUBSEQ VISIT: HCPCS | Mod: ,,, | Performed by: NURSE PRACTITIONER

## 2023-05-18 PROCEDURE — 1159F PR MEDICATION LIST DOCUMENTED IN MEDICAL RECORD: ICD-10-PCS | Mod: CPTII,,, | Performed by: NURSE PRACTITIONER

## 2023-05-18 PROCEDURE — 99214 OFFICE O/P EST MOD 30 MIN: CPT | Mod: PN | Performed by: NURSE PRACTITIONER

## 2023-05-18 PROCEDURE — 1126F PR PAIN SEVERITY QUANTIFIED, NO PAIN PRESENT: ICD-10-PCS | Mod: CPTII,,, | Performed by: NURSE PRACTITIONER

## 2023-05-18 PROCEDURE — 3008F PR BODY MASS INDEX (BMI) DOCUMENTED: ICD-10-PCS | Mod: CPTII,,, | Performed by: NURSE PRACTITIONER

## 2023-05-18 PROCEDURE — 3051F PR MOST RECENT HEMOGLOBIN A1C LEVEL 7.0 - < 8.0%: ICD-10-PCS | Mod: CPTII,,, | Performed by: NURSE PRACTITIONER

## 2023-05-18 NOTE — TELEPHONE ENCOUNTER
This morning, I tried to help patient get access to MyChart; however, pt wanted just wanted to see RUBY Liu and leave to go home because she had something to do. I quickly finished the questionnaires with pt and let pt know RUBY Liu will be in to see her in less than 5 minutes.

## 2023-05-18 NOTE — PATIENT INSTRUCTIONS
Counseling and Referral of Other Preventative  (Italic type indicates deductible and co-insurance are waived)    Patient Name: Sarita Alvarez  Today's Date: 5/18/2023    Health Maintenance       Date Due Completion Date    TETANUS VACCINE Never done ---    Shingles Vaccine (2 of 3) 03/02/2016 1/6/2016    DEXA Scan 06/04/2023 6/4/2020    Hemoglobin A1c 08/18/2023 2/18/2023    Mammogram 10/13/2023 10/13/2022    Diabetes Urine Screening 02/18/2024 2/18/2023    Lipid Panel 02/18/2024 2/18/2023    Eye Exam 02/20/2024 2/20/2023    Foot Exam 03/03/2024 3/3/2023    Override on 1/29/2016: Done    Override on 11/28/2014: Done    High Dose Statin 04/25/2024 4/25/2023    Colorectal Cancer Screening 12/02/2026 12/2/2016        No orders of the defined types were placed in this encounter.    The following information is provided to all patients.  This information is to help you find resources for any of the problems found today that may be affecting your health:                Living healthy guide: www.[facility].[place].gov      Understanding Diabetes: www.diabetes.org      Eating healthy: www.cdc.gov/healthyweight      CDC home safety checklist: www.cdc.gov/steadi/patient.html      Agency on Aging: www.goea.[place].[facility]      Alcoholics anonymous (AA): www.aa.org      Physical Activity: www.stacey.nih.gov/bt2hxdz      Tobacco use: www.quitwithusla.org

## 2023-05-18 NOTE — PROGRESS NOTES
"    I offered to discuss advanced care planning, including how to pick a person who would make decisions for you if you were unable to make them for yourself, called a health care power of , and what kind of decisions you might make such as use of life sustaining treatments such as ventilators and tube feeding when faced with a life limiting illness recorded on a living will that they will need to know. (How you want to be cared for as you near the end of your natural life)     X Patient is interested in learning more about how to make advanced directives.  I provided them paperwork and offered to discuss this with them.  Sarita Alvarez presented for a  Medicare AWV and comprehensive Health Risk Assessment today. The following components were reviewed and updated:    Medical history  Family History  Social history  Allergies and Current Medications  Health Risk Assessment  Health Maintenance  Care Team         ** See Completed Assessments for Annual Wellness Visit within the encounter summary.**         The following assessments were completed:  Living Situation  CAGE  Depression Screening  Timed Get Up and Go  Whisper Test  Cognitive Function Screening  Nutrition Screening  ADL Screening  PAQ Screening          Vitals:    05/18/23 0935   BP: 134/66   BP Location: Left arm   Patient Position: Sitting   BP Method: Medium (Manual)   Weight: 51.7 kg (113 lb 15.7 oz)   Height: 5' 4" (1.626 m)     Body mass index is 19.56 kg/m².    Physical Exam  Vitals reviewed.   Constitutional:       Appearance: She is well-developed.   HENT:      Head: Normocephalic and atraumatic. Not macrocephalic and not microcephalic. No raccoon eyes, Kay's sign, abrasion, contusion, right periorbital erythema, left periorbital erythema or laceration. Hair is normal.      Right Ear: No decreased hearing noted. No laceration, drainage, swelling or tenderness. No middle ear effusion. No foreign body. No mastoid tenderness. No " hemotympanum. Tympanic membrane is not injected, scarred, perforated, erythematous, retracted or bulging. Tympanic membrane has normal mobility.      Left Ear: No decreased hearing noted. No laceration, drainage, swelling or tenderness.  No middle ear effusion. No foreign body. No mastoid tenderness. No hemotympanum. Tympanic membrane is not injected, scarred, perforated, erythematous, retracted or bulging. Tympanic membrane has normal mobility.      Nose: Nose normal. No nasal deformity, laceration or mucosal edema.      Mouth/Throat:      Pharynx: Uvula midline.   Eyes:      General: Lids are normal. No scleral icterus.     Conjunctiva/sclera: Conjunctivae normal.   Neck:      Thyroid: No thyroid mass or thyromegaly.      Trachea: Trachea normal.   Cardiovascular:      Rate and Rhythm: Normal rate and regular rhythm.   Pulmonary:      Effort: Pulmonary effort is normal. No respiratory distress.      Breath sounds: Normal breath sounds.   Abdominal:      Palpations: Abdomen is soft.   Musculoskeletal:         General: Normal range of motion.      Cervical back: Neck supple. No edema or erythema. No spinous process tenderness or muscular tenderness. Normal range of motion.   Lymphadenopathy:      Head:      Right side of head: No submental, submandibular, tonsillar, preauricular or posterior auricular adenopathy.      Left side of head: No submental, submandibular, tonsillar, preauricular, posterior auricular or occipital adenopathy.   Skin:     General: Skin is warm and dry.   Neurological:      Mental Status: She is alert and oriented to person, place, and time.      Cranial Nerves: No cranial nerve deficit.      Sensory: No sensory deficit.   Psychiatric:         Behavior: Behavior normal.         Thought Content: Thought content normal.         Judgment: Judgment normal.           Diagnoses and health risks identified today and associated recommendations/orders:    1. Encounter for preventive health  examination  Annual Health Risk Assessment (HRA) visit today.  Counseling and referral of health maintenance and preventative health measures performed.  Patient given annual wellness paperwork to take home.  Encouraged to return in 1 year for subsequent HRA visit.     2. Aortic atherosclerosis  Chronic. Stable. Continue current treatment plan as previously prescribed by PCP.    3. Supraventricular tachycardia  Chronic. Stable. Continue current treatment plan as previously prescribed by PCP.    4. Status post catheter ablation of slow pathway  Chronic. Stable. Continue current treatment plan as previously prescribed by PCP.    5. Mixed hyperlipidemia  Chronic. Stable. Continue current treatment plan as previously prescribed by PCP.    6. Essential hypertension  Chronic. Stable. Controlled. Encouraged to increase exercise as tolerated (moderate-intensity aerobic activity and muscle-strengthening activities) improve diet to heart healthy, low sodium diet.  Continue current treatment plan as previously prescribed by PCP.    7. Age-related cataract of both eyes, unspecified age-related cataract type  Chronic. Stable. Continue current treatment plan as previously prescribed by PCP.    8. Diabetic peripheral neuropathy associated with type 1 diabetes mellitus  Chronic. Stable. Continue current treatment plan as previously prescribed by PCP.    9. Venous insufficiency (chronic) (peripheral)  Chronic. Stable. Continue current treatment plan as previously prescribed by PCP.    10. 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 Stable. Continue current treatment plan as previously prescribed by PCP.    11. Hyperthyroidism  Chronic. Stable. Continue current treatment plan as previously prescribed by PCP.    12. Hashimoto's disease  Chronic. Stable. Continue current treatment plan as previously prescribed by PCP.    13. Osteoporosis, post-menopausal  Chronic. Stable. Continue current treatment plan as  previously prescribed by PCP.    14. Strain of neck muscle, initial encounter  Chronic. Stable. Continue current treatment plan as previously prescribed by PCP.    15. Chronic right shoulder pain  Chronic. Stable. Continue current treatment plan as previously prescribed by PCP.      Provided Sarita with a 5-10 year written screening schedule and personal prevention plan. Recommendations were developed using the USPSTF age appropriate recommendations. Education, counseling, and referrals were provided as needed. After Visit Summary printed and given to patient which includes a list of additional screenings\tests needed.      I offered to discuss end of life issues, including information on how to make advance directives that the patient could use to name someone who would make medical decisions on their behalf if they became too ill to make themselves.    ___Patient declined  _X_Patient is interested, I provided paper work and offered to discuss.    Follow up in about 1 year (around 5/18/2024).    Noe Reese NP

## 2023-05-31 ENCOUNTER — TELEPHONE (OUTPATIENT)
Dept: INTERNAL MEDICINE | Facility: CLINIC | Age: 73
End: 2023-05-31
Payer: MEDICARE

## 2023-05-31 NOTE — TELEPHONE ENCOUNTER
Received note from Ms Mary that pt needs to reschedule their appt they had scheduled past the date that Dr. Ford will be leaving [facility] and she wanted me to reach out to pt to schedule and to inform them he is leaving in case they have not received the letter yet        Messaged pt and selected option to be notified if not read by Friday

## 2023-05-31 NOTE — LETTER
June 8, 2023    Sarita Alvarez  [address]  [facility] [address]             [facility] - Internal Medicine  [address]  [facility] [address]  Phone: [phone]  Fax: [phone] Dear Ms. Alvarez:  Good speaking with you! Sorry you didn't receive our previous letter, and I am including the content of it below.    If you have any questions or concerns, please don't hesitate to call.    We regret to inform you of Dr. Ford's upcoming departure from [facility]. As of  August 2, 2023, Dr. Ford will no longer practice as a primary care provider in Internal Medicine at [facility]. Ensuring you continue to receive the best possible healthcare is our top priority. To make this transition seamless, we encourage you to continue your care at [facility] with one of our trusted providers. Our advanced team of primary care providers is prepared to manage your care. Please reply to this portal message or call us to get rescheduled with one of our providers at your earliest convenience.    The following providers at [facility] and other nearby locations are accepting new patients:     [facility] location:  Dr. Alon Teresa   [facility] location -   [address] [address]  Dr. Jenny Ramirez   [facility] location - [address]  [place]  Dr. Maria G Pino   Getting full soon but available for now at [facility]:  Dr. Thom Cantu  We are confident you will continue to receive the excellent treatment and service to which you are accustomed. As always, your medical records of past care with Dr. Ford at [facility] are available to any of our providers, ensuring no disruption in your care. Thank you for entrusting [facility] with your healthcare needs. We are looking forward to hearing from you soon!    Llui  [phone]

## 2023-06-08 NOTE — TELEPHONE ENCOUNTER
Called pt as second attempt to review what I sent in msg  Spoke to pt   She said she never received our letter, so I let her know I'm sending one out now and to call us next week if she hasn't heard back  I told her also to call once she's ready to est care w/ another provider  Pt verbalized understanding and had no further concerns or questions.

## 2023-06-13 ENCOUNTER — OFFICE VISIT (OUTPATIENT)
Dept: ENDOCRINOLOGY | Facility: CLINIC | Age: 73
End: 2023-06-13
Payer: MEDICARE

## 2023-06-13 VITALS
SYSTOLIC BLOOD PRESSURE: 126 MMHG | WEIGHT: 114.19 LBS | DIASTOLIC BLOOD PRESSURE: 62 MMHG | HEIGHT: 64 IN | BODY MASS INDEX: 19.5 KG/M2

## 2023-06-13 DIAGNOSIS — E13.9 LATENT AUTOIMMUNE DIABETES IN ADULTS (LADA), MANAGED AS TYPE 1: ICD-10-CM

## 2023-06-13 DIAGNOSIS — E10.42 DIABETIC PERIPHERAL NEUROPATHY ASSOCIATED WITH TYPE 1 DIABETES MELLITUS: ICD-10-CM

## 2023-06-13 DIAGNOSIS — E78.2 MIXED HYPERLIPIDEMIA: Chronic | ICD-10-CM

## 2023-06-13 DIAGNOSIS — E10.9 TYPE 1 DIABETES MELLITUS NOT AT GOAL: ICD-10-CM

## 2023-06-13 DIAGNOSIS — E05.90 HYPERTHYROIDISM: Primary | ICD-10-CM

## 2023-06-13 PROCEDURE — 3066F NEPHROPATHY DOC TX: CPT | Mod: CPTII,S$GLB,, | Performed by: INTERNAL MEDICINE

## 2023-06-13 PROCEDURE — 99999 PR PBB SHADOW E&M-EST. PATIENT-LVL IV: CPT | Mod: PBBFAC,,, | Performed by: INTERNAL MEDICINE

## 2023-06-13 PROCEDURE — 3061F NEG MICROALBUMINURIA REV: CPT | Mod: CPTII,S$GLB,, | Performed by: INTERNAL MEDICINE

## 2023-06-13 PROCEDURE — 3288F PR FALLS RISK ASSESSMENT DOCUMENTED: ICD-10-PCS | Mod: CPTII,S$GLB,, | Performed by: INTERNAL MEDICINE

## 2023-06-13 PROCEDURE — 3078F DIAST BP <80 MM HG: CPT | Mod: CPTII,S$GLB,, | Performed by: INTERNAL MEDICINE

## 2023-06-13 PROCEDURE — 1160F RVW MEDS BY RX/DR IN RCRD: CPT | Mod: CPTII,S$GLB,, | Performed by: INTERNAL MEDICINE

## 2023-06-13 PROCEDURE — 99999 PR PBB SHADOW E&M-EST. PATIENT-LVL IV: ICD-10-PCS | Mod: PBBFAC,,, | Performed by: INTERNAL MEDICINE

## 2023-06-13 PROCEDURE — 4010F ACE/ARB THERAPY RXD/TAKEN: CPT | Mod: CPTII,S$GLB,, | Performed by: INTERNAL MEDICINE

## 2023-06-13 PROCEDURE — 1126F AMNT PAIN NOTED NONE PRSNT: CPT | Mod: CPTII,S$GLB,, | Performed by: INTERNAL MEDICINE

## 2023-06-13 PROCEDURE — 3061F PR NEG MICROALBUMINURIA RESULT DOCUMENTED/REVIEW: ICD-10-PCS | Mod: CPTII,S$GLB,, | Performed by: INTERNAL MEDICINE

## 2023-06-13 PROCEDURE — 1101F PT FALLS ASSESS-DOCD LE1/YR: CPT | Mod: CPTII,S$GLB,, | Performed by: INTERNAL MEDICINE

## 2023-06-13 PROCEDURE — 3051F PR MOST RECENT HEMOGLOBIN A1C LEVEL 7.0 - < 8.0%: ICD-10-PCS | Mod: CPTII,S$GLB,, | Performed by: INTERNAL MEDICINE

## 2023-06-13 PROCEDURE — 1126F PR PAIN SEVERITY QUANTIFIED, NO PAIN PRESENT: ICD-10-PCS | Mod: CPTII,S$GLB,, | Performed by: INTERNAL MEDICINE

## 2023-06-13 PROCEDURE — 1101F PR PT FALLS ASSESS DOC 0-1 FALLS W/OUT INJ PAST YR: ICD-10-PCS | Mod: CPTII,S$GLB,, | Performed by: INTERNAL MEDICINE

## 2023-06-13 PROCEDURE — 3008F PR BODY MASS INDEX (BMI) DOCUMENTED: ICD-10-PCS | Mod: CPTII,S$GLB,, | Performed by: INTERNAL MEDICINE

## 2023-06-13 PROCEDURE — 99214 OFFICE O/P EST MOD 30 MIN: CPT | Mod: S$GLB,,, | Performed by: INTERNAL MEDICINE

## 2023-06-13 PROCEDURE — 4010F PR ACE/ARB THEARPY RXD/TAKEN: ICD-10-PCS | Mod: CPTII,S$GLB,, | Performed by: INTERNAL MEDICINE

## 2023-06-13 PROCEDURE — 3074F SYST BP LT 130 MM HG: CPT | Mod: CPTII,S$GLB,, | Performed by: INTERNAL MEDICINE

## 2023-06-13 PROCEDURE — 3074F PR MOST RECENT SYSTOLIC BLOOD PRESSURE < 130 MM HG: ICD-10-PCS | Mod: CPTII,S$GLB,, | Performed by: INTERNAL MEDICINE

## 2023-06-13 PROCEDURE — 3078F PR MOST RECENT DIASTOLIC BLOOD PRESSURE < 80 MM HG: ICD-10-PCS | Mod: CPTII,S$GLB,, | Performed by: INTERNAL MEDICINE

## 2023-06-13 PROCEDURE — 3008F BODY MASS INDEX DOCD: CPT | Mod: CPTII,S$GLB,, | Performed by: INTERNAL MEDICINE

## 2023-06-13 PROCEDURE — 3051F HG A1C>EQUAL 7.0%<8.0%: CPT | Mod: CPTII,S$GLB,, | Performed by: INTERNAL MEDICINE

## 2023-06-13 PROCEDURE — 1159F MED LIST DOCD IN RCRD: CPT | Mod: CPTII,S$GLB,, | Performed by: INTERNAL MEDICINE

## 2023-06-13 PROCEDURE — 99214 PR OFFICE/OUTPT VISIT, EST, LEVL IV, 30-39 MIN: ICD-10-PCS | Mod: S$GLB,,, | Performed by: INTERNAL MEDICINE

## 2023-06-13 PROCEDURE — 3066F PR DOCUMENTATION OF TREATMENT FOR NEPHROPATHY: ICD-10-PCS | Mod: CPTII,S$GLB,, | Performed by: INTERNAL MEDICINE

## 2023-06-13 PROCEDURE — 1160F PR REVIEW ALL MEDS BY PRESCRIBER/CLIN PHARMACIST DOCUMENTED: ICD-10-PCS | Mod: CPTII,S$GLB,, | Performed by: INTERNAL MEDICINE

## 2023-06-13 PROCEDURE — 1159F PR MEDICATION LIST DOCUMENTED IN MEDICAL RECORD: ICD-10-PCS | Mod: CPTII,S$GLB,, | Performed by: INTERNAL MEDICINE

## 2023-06-13 PROCEDURE — 3288F FALL RISK ASSESSMENT DOCD: CPT | Mod: CPTII,S$GLB,, | Performed by: INTERNAL MEDICINE

## 2023-06-13 RX ORDER — INSULIN LISPRO 100 [IU]/ML
INJECTION, SOLUTION INTRAVENOUS; SUBCUTANEOUS
Qty: 15 ML | Refills: 11 | Status: SHIPPED | OUTPATIENT
Start: 2023-06-13 | End: 2023-10-11 | Stop reason: SDUPTHER

## 2023-06-13 RX ORDER — METHIMAZOLE 5 MG/1
TABLET ORAL
Qty: 45 TABLET | Refills: 3 | Status: SHIPPED | OUTPATIENT
Start: 2023-06-13 | End: 2023-09-13 | Stop reason: SDUPTHER

## 2023-06-13 RX ORDER — INSULIN DEGLUDEC 100 U/ML
15 INJECTION, SOLUTION SUBCUTANEOUS DAILY
Qty: 5 PEN | Refills: 11 | Status: SHIPPED | OUTPATIENT
Start: 2023-06-13 | End: 2023-07-03 | Stop reason: SDUPTHER

## 2023-06-13 NOTE — ASSESSMENT & PLAN NOTE
--Patient with NILAY treated as type 1 diabetes  --A1c goal <7.5%  --Recent A1c 7.8%  --She would greatly benefit from a CGM and I have recommended the Dexcom  --On 4 insulin injections per day, checks glucoses >4 times per day, has frequent hypoglycemia  --Will get her an appt with diabetes education to review how to use the Dexcom  --Will continue Tresiba 15 units daily  --Will continue Humalog TID AC at 1:13 ICR with low dose correction scale (self-adjusting insulin)  --UTD with foot exam and eye exam  --has glucagon emergency kit

## 2023-06-13 NOTE — PROGRESS NOTES
Subjective:      Patient ID: Sarita Alvarez is a 73 y.o. female.    Chief Complaint:  Diabetes      History of Present Illness  Ms. Alvarez presents for follow up of NILAY treated as type 1 and hyperthyroidism secondary to Grave's disease. Last visit 1/2022.       Diagnosed with gestational DM at age 42. In 1997, she was diagnosed with T2DM, however workup revealed NILAY and now treated as type 1 diabetes.      , has children, grandchildren, retired.     In regards to diabetes:    No hospitalizations or illnesses since last visit with me.      Checks BG 6-8 times per day (fasting, pre-meal and 1-2 hrs post-meal)  Reviewed meter:  14 day average: 174  30 day average: 175     Has had hypoglycemia in the 60's 1-2 times per month and occurs during the day. She will take less insulin with the preceding meals if she knows she is going to be active. Able to recognize and treat. No nocturnal hypoglycemia.      Eats breakfast, lunch, and dinner. Counts carbs for meals/snacks. Incorporates snacks with meals so they are covered with single bolus.      Doesn't miss any insulin doses.      No interest in insulin pump. But interested in Dexcom.      CURRENT DIABETIC MEDS: Tresiba 15 units QAM, Novolog 2-5 units based on 1:13 I:C ratio, goal 100, ISF 50  Very sensitive to insulin     Uses Vials or Pens: Pens  Type of Glucose Meter: Relion Meter     Diabetes Management Status    Statin: Taking  ACE/ARB: Taking    Screening or Prevention Patient's value Goal Complete/Controlled?   HgA1C Testing and Control   Lab Results   Component Value Date    HGBA1C 7.8 (H) 02/18/2023      Annually/Less than 8% Yes   Lipid profile : 02/18/2023 Annually Yes   LDL control Lab Results   Component Value Date    LDLCALC 70.2 02/18/2023    Annually/Less than 100 mg/dl  Yes   Nephropathy screening Lab Results   Component Value Date    LABMICR 6.0 02/18/2023     Lab Results   Component Value Date    PROTEINUA Negative 04/17/2023     No results  found for: UTPCR   Annually Yes   Blood pressure BP Readings from Last 1 Encounters:   06/13/23 126/62    Less than 140/90 Yes   Dilated retinal exam : 02/20/2023 Annually Yes   Foot exam   : 03/03/2023 Annually Yes      In regards to hyperthyroidism:  Diagnosed with Grave's disease in 1/2013.      Initial labs at diagnosis:  Results for LICHA CHENEY (MRN [id number]) as of 6/16/2020 09:06    Ref. Range 1/7/2013 09:16 2/5/2013 16:20   TSH Latest Ref Range: 0.4 - 4.0 uIU/ml <0.010 (L)     Free T4 Latest Ref Range: 0.71 - 1.51 ng/dl 2.43 (H)     Thyroperoxidase Antibodies Latest Ref Range: 0 - 6 IU/mL   740.7 (H)   Thyroid-Stim IG Quantitative Latest Ref Range: <140 %   373 (H)      Currently on methimazole 2.5 mg once daily.   Denies palpitations or tremor.       Latest Reference Range & Units 05/10/22 08:47   TSH 0.400 - 4.000 uIU/mL  0.400 - 4.000 uIU/mL 1.765  1.765         Review of Systems   Constitutional:  Negative for chills and fever.   Gastrointestinal:  Negative for nausea.     Objective:   Physical Exam  Vitals and nursing note reviewed.     BP Readings from Last 3 Encounters:   06/13/23 126/62   05/18/23 134/66   04/25/23 122/66     Wt Readings from Last 1 Encounters:   06/13/23 0843 51.8 kg (114 lb 3.2 oz)       Body mass index is 19.6 kg/m².    Lab Review:   Lab Results   Component Value Date    HGBA1C 7.8 (H) 02/18/2023     Lab Results   Component Value Date    CHOL 157 02/18/2023    HDL 78 (H) 02/18/2023    LDLCALC 70.2 02/18/2023    TRIG 44 02/18/2023    CHOLHDL 49.7 02/18/2023     Lab Results   Component Value Date     04/17/2023    K 3.6 04/17/2023     04/17/2023    CO2 27 04/17/2023     (H) 04/17/2023    BUN 21 04/17/2023    CREATININE 0.8 04/17/2023    CALCIUM 10.0 04/17/2023    PROT 7.2 04/17/2023    ALBUMIN 4.0 04/17/2023    BILITOT 0.4 04/17/2023    ALKPHOS 74 04/17/2023    AST 17 04/17/2023    ALT 16 04/17/2023    ANIONGAP 10 04/17/2023    ESTGFRAFRICA >60.0 07/13/2022     EGFRNONAA >60.0 07/13/2022    TSH 1.765 05/10/2022    TSH 1.765 05/10/2022         Assessment and Plan     Hyperthyroidism  --Patient with hyperthyroidism secondary to Grave's disease  --TRAb is now undetectable and TSH remains normal  --Will continue methimazole 2.5 mg daily at this time  --TSH with next labs    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), managed as type 1  --Patient with NILAY treated as type 1 diabetes  --A1c goal <7.5%  --Recent A1c 7.8%  --She would greatly benefit from a CGM and I have recommended the Dexcom  --On 4 insulin injections per day, checks glucoses >4 times per day, has frequent hypoglycemia  --Will get her an appt with diabetes education to review how to use the Dexcom  --Will continue Tresiba 15 units daily  --Will continue Humalog TID AC at 1:13 ICR with low dose correction scale (self-adjusting insulin)  --UTD with foot exam and eye exam  --has glucagon emergency kit    Diabetic peripheral neuropathy associated with type 1 diabetes mellitus  --up to date with eye exam  --No history of foot ulcer  --Better long term blood glucose control to prevent progression    Mixed hyperlipidemia  --On statin per ADA recs      A1c and TSH drawn when able, needs paperwork for Dexcom submitted, needs referral to diabetes education to review Dexcom, follow up in 6 months      Ronald Koch M.D. Staff Endocrinology

## 2023-06-14 DIAGNOSIS — E13.9 LATENT AUTOIMMUNE DIABETES IN ADULTS (LADA), MANAGED AS TYPE 1: ICD-10-CM

## 2023-06-14 RX ORDER — BLOOD-GLUCOSE TRANSMITTER
1 EACH MISCELLANEOUS
Qty: 2 EACH | Refills: 1 | Status: CANCELLED | OUTPATIENT
Start: 2023-06-14 | End: 2024-06-13

## 2023-06-14 RX ORDER — BLOOD-GLUCOSE SENSOR
3 EACH MISCELLANEOUS
Qty: 3 EACH | Refills: 11 | Status: CANCELLED | OUTPATIENT
Start: 2023-06-14 | End: 2024-06-13

## 2023-06-14 RX ORDER — BLOOD-GLUCOSE SENSOR
1 EACH MISCELLANEOUS
Qty: 3 EACH | Refills: 11 | Status: SHIPPED | OUTPATIENT
Start: 2023-06-14 | End: 2023-11-01

## 2023-06-23 ENCOUNTER — TELEPHONE (OUTPATIENT)
Dept: ENDOCRINOLOGY | Facility: CLINIC | Age: 73
End: 2023-06-23
Payer: MEDICARE

## 2023-06-23 NOTE — TELEPHONE ENCOUNTER
----- Message from Luis Michael sent at 6/23/2023  1:00 PM CDT -----  Contact: @[phone]  Pt is calling in stating that she received the dex com and she needs to know what to do with the material she received. Please call to discuss further.

## 2023-06-23 NOTE — TELEPHONE ENCOUNTER
Returned patient call and she stated that she had gotten her Dexcom G7 so I put her an appointment in for Diabetes Education class

## 2023-06-27 ENCOUNTER — LAB VISIT (OUTPATIENT)
Dept: LAB | Facility: OTHER | Age: 73
End: 2023-06-27
Attending: INTERNAL MEDICINE
Payer: MEDICARE

## 2023-06-27 DIAGNOSIS — E05.90 HYPERTHYROIDISM: ICD-10-CM

## 2023-06-27 DIAGNOSIS — E13.9 LATENT AUTOIMMUNE DIABETES IN ADULTS (LADA), MANAGED AS TYPE 1: ICD-10-CM

## 2023-06-27 LAB
ESTIMATED AVG GLUCOSE: 169 MG/DL (ref 68–131)
HBA1C MFR BLD: 7.5 % (ref 4–5.6)
TSH SERPL DL<=0.005 MIU/L-ACNC: 2.84 UIU/ML (ref 0.4–4)

## 2023-06-27 PROCEDURE — 83036 HEMOGLOBIN GLYCOSYLATED A1C: CPT | Performed by: INTERNAL MEDICINE

## 2023-06-27 PROCEDURE — 36415 COLL VENOUS BLD VENIPUNCTURE: CPT | Performed by: INTERNAL MEDICINE

## 2023-06-27 PROCEDURE — 84443 ASSAY THYROID STIM HORMONE: CPT | Performed by: INTERNAL MEDICINE

## 2023-07-03 DIAGNOSIS — E10.9 TYPE 1 DIABETES MELLITUS NOT AT GOAL: Primary | ICD-10-CM

## 2023-07-03 RX ORDER — INSULIN DEGLUDEC 100 U/ML
15 INJECTION, SOLUTION SUBCUTANEOUS DAILY
Qty: 5 PEN | Refills: 11 | Status: SHIPPED | OUTPATIENT
Start: 2023-07-03 | End: 2023-10-11 | Stop reason: SDUPTHER

## 2023-07-03 NOTE — TELEPHONE ENCOUNTER
----- Message from Danna Raya CMA sent at 7/3/2023  2:10 PM CDT -----  Regarding: Please call results  Contact: [phone]  Hi    Pt is requesting a call with results and speak to the nurse regarding a prescription.insulin degludec (TRESIBA FLEXTOUCH U-100) 100 unit/mL (3 mL) insulin pen      [facility] [address] [place] [address] - [address]  [address] [facility] [address]  Phone: [phone] Fax: [phone]        Thank you

## 2023-07-12 ENCOUNTER — CLINICAL SUPPORT (OUTPATIENT)
Dept: DIABETES | Facility: CLINIC | Age: 73
End: 2023-07-12
Payer: MEDICARE

## 2023-07-12 DIAGNOSIS — E13.9 LATENT AUTOIMMUNE DIABETES IN ADULTS (LADA), MANAGED AS TYPE 1: ICD-10-CM

## 2023-07-12 PROCEDURE — 99999 PR PBB SHADOW E&M-EST. PATIENT-LVL I: ICD-10-PCS | Mod: PBBFAC,,,

## 2023-07-12 PROCEDURE — G0108 PR DIAB MANAGE TRN  PER INDIV: ICD-10-PCS | Mod: S$GLB,,, | Performed by: INTERNAL MEDICINE

## 2023-07-12 PROCEDURE — G0108 DIAB MANAGE TRN  PER INDIV: HCPCS | Mod: S$GLB,,, | Performed by: INTERNAL MEDICINE

## 2023-07-12 PROCEDURE — 99999 PR PBB SHADOW E&M-EST. PATIENT-LVL I: CPT | Mod: PBBFAC,,,

## 2023-07-12 NOTE — PROGRESS NOTES
Diabetes Care Specialist Progress Note  Author: Liz Ellison RN, CDE  Date: 7/12/2023    Program Intake  Reason for Diabetes Program Visit:: Intervention  Type of Intervention:: Individual  Individual: Device Training  Device Training: Personal CGM  Current diabetes risk level:: moderate  In the last 12 months, have you:: none  Permission to speak with others about care:: no    Lab Results   Component Value Date    HGBA1C 7.5 (H) 06/27/2023       Clinical    Problem Review  Reviewed Problem List with Patient: yes  Active comorbidities affecting diabetes self-care.: yes  Comorbidities: Cardiovascular Disease, Hypertension  Reviewed health maintenance: yes    Clinical Assessment  Have you ever experienced hypoglycemia (low blood sugar)?: yes  In the last month, how often have you experienced low blood sugar?: once every other week  What symptoms do you experience?: Sweaty, Hungry  Have you ever been hospitalized because your blood sugar was too low?: no      Additional Social History    Support  Does anyone support you with your diabetes care?: yes    Access to Mass Media & Technology  Does the patient have access to any of the following devices or technologies?: Smart phone    Health Literacy  Preferred Learning Method: Face to Face, Demonstration, Reading Materials      Diabetes Self-Management Skills Assessment    Diabetes Disease Process/Treatment Options  Patient/caregiver able to state what happens when someone has diabetes.: yes  Patient/caregiver knows what type of diabetes they have.: yes  Diabetes Type : Type I  Patient/caregiver able to identify at least three signs and symptoms of diabetes.: yes  Identified signs and symptoms:: increased thirst, frequent urination, frequent infections  Patient able to identify at least three risk factors for diabetes.: yes  Identified risk factors:: age over 40, history of gestational diabetes  Assessment indicates:: Adequate understanding  Area of need?:  No    Nutrition/Healthy Eating  Method of carbohydrate measurement:: carb counting/reading labels  Patient can identify foods that impact blood sugar.: yes  Patient-identified foods:: sweets, fruit/fruit juice, yogurt, milk, soda, starchy vegetables (corn, peas, beans), starches (bread, pasta, rice, cereal)  Assessment indicates:: Adequate understanding  Area of need?: No    Physical Activity/Exercise  Patient's daily activity level:: moderately active  Patient formally exercises outside of work.: yes  Exercise Type: walking  Intensity: Low  Frequency: four or more times a week  Patient can identify forms of physical activity.: yes  Stated forms of physical activity:: any movement performed by muscles that uses energy  Patient can identify reasons why exercise/physical activity is important in diabetes management.: yes  Identified reasons:: lowers blood glucose, blood pressure, and cholesterol, strengthens heart, muscles, and bones  Assessment indicates:: Adequate understanding  Area of need?: No    Medications  Patient is able to describe current diabetes management routine.: yes  Diabetes management routine:: insulin  Patient is able to identify current diabetes medications, dosages, and appropriate timing of medications.: yes  Patient understands the purpose of the medications taken for diabetes.: yes  Patient reports problems or concerns with current medication regimen.: no  Assessment indicates:: Adequate understanding  Area of need?: No    Home Blood Glucose Monitoring  Patient states that blood sugar is checked at home daily.: yes  Monitoring Method:: home glucometer  Home glucometer meter type:: Insurance preferred meter  Blood glucose logs:: no, encouraged to keep logs, encouraged to bring logs to provider visits  Blood glucose logs reviewed today?: no  Assessment indicates:: Knowledge deficit  Area of need?: Yes    Acute Complications  Able to state the blood sugar range for hypoglycemia?: yes  Patient can  identify general symptoms of hypoglycemia: yes  Patient identified:: shakiness  Able to state proper treatment of hypoglycemia?: yes  Patient identified:: other (see comments), 4 glucose tablets (Eats 4 orin crackers)  Assessment indicates:: Adequate understanding  Area of need?: No    Chronic Complications  Deferred due to:: Time    Psychosocial/Coping  Deffered due to:: Time      Diabetes Self Support Plan         Assessment Summary and Plan    Based on today's diabetes care assessment, the following areas of need were identified:      Social 1/31/2022   Support No   Access to Mass Media/Tech No   Cognitive/Behavioral Health No   Culture/Jain No   Communication No   Health Literacy No        Clinical 1/31/2022   Medication Adherence No   Nutritional Status No        Diabetes Self-Management Skills 7/12/2023   Diabetes Disease Process/Treatment Options No   Nutrition/Healthy Eating No   Physical Activity/Exercise No   Medication No   Home Blood Glucose Monitoring Yes, see care planning   Acute Complications No          Today's interventions were provided through individual discussion, instruction, and written materials were provided.      Patient verbalized understanding of instruction and written materials.  Pt was able to return back demonstration of instructions today. Patient understood key points, needs reinforcement and further instruction.     Diabetes Self-Management Care Plan:    Today's Diabetes Self-Management Care Plan was developed with Sarita's input. Sarita has agreed to work toward the following goal(s) to improve his/her overall diabetes control.      Care Plan: Diabetes Management   Updates made since 6/12/2023 12:00 AM        Problem: Blood Glucose Self-Monitoring         Goal: Patient agrees to check and record blood sugars using the Dexcom G7 system    Start Date: 1/31/2022   Priority: Low   Barriers: Lack of Motivation to Change   Note:    Patient seen in clinic today.  Imtiaz pearson  "Dexcom system from pharmacy.  Patient not aware of device.  We reviewed how the Dexcom works, insertion of 10 day sensor, using phone as . Explained she wears the sensor for 10 days, can bathe, shower/swim, no exposure to x-rays.  She was show demo on how device work.  She will think about and see if she would like to move forward. Stated that she feels intimidated by tech. Provided phone number to call should she decides to move forward.     Update to care planning 07/12/2023    DEXCOM G7 CGM TRAINING     Patient referred to clinic today for Dexcom G7 continuous glucose sensor system training.  Dexcom G7 mobile jaswinder downloaded to phone. Education was provided using "Quick Start Guide" per Dexcom protocol.    Overview:  5min glucose reading updates, trending arrows, BG graph screens, battery life indicator, Blue Tooth Symbol.  Pt instructed on lag time of interstitial fluid from CBG and was advised to tx hypoglycemia and dose insulin based on SMBG values.  Patient advised to always check glucose with glucometer should readings do not match symptoms felt (ex. Hypo or hyperglycemia).     Menus: trend Graph, start sensor, enter BG, events, Alerts, Settings  Alerts: Low alert set at 80; High alert set at 250, Urgent low fixed at 55 mg/dl  Reviewed where to find sensor insertion time and sensor expiration date.   Reviewed sensor site selection. Site selected and prepped using aseptic technique Inserted to upper arm.  Reviewed problem solving aspects of sensor transmission/ variables that can disrupt RF transmission.  range 20 feet    Patient is linked directly to [facility] Dexcom account    UN phone [phone]  P/w [id number]           Follow Up Plan     F/u in about 2 wks to review Dexcom, complete assessment    Today's care plan and follow up schedule was discussed with patient.  Sarita verbalized understanding of the care plan, goals, and agrees to follow up plan.        The patient was " encouraged to communicate with his/her health care provider/physician and care team regarding his/her condition(s) and treatment.  I provided the patient with my contact information today and encouraged to contact me via phone or [facility] Patient Portal as needed.     Length of Visit   Total Time: 60 Minutes

## 2023-07-21 ENCOUNTER — PATIENT MESSAGE (OUTPATIENT)
Dept: DIABETES | Facility: CLINIC | Age: 73
End: 2023-07-21
Payer: MEDICARE

## 2023-07-24 DIAGNOSIS — E78.5 HYPERLIPIDEMIA, UNSPECIFIED HYPERLIPIDEMIA TYPE: ICD-10-CM

## 2023-07-24 RX ORDER — SIMVASTATIN 20 MG/1
TABLET, FILM COATED ORAL
Qty: 90 TABLET | Refills: 0 | Status: SHIPPED | OUTPATIENT
Start: 2023-07-24 | End: 2023-10-25 | Stop reason: SDUPTHER

## 2023-07-24 NOTE — TELEPHONE ENCOUNTER
No care due was identified.  [facility] Embedded Care Due Messages. Reference number: [id number].   7/24/2023 12:50:36 PM CDT

## 2023-07-24 NOTE — TELEPHONE ENCOUNTER
Refill Decision Note   Sarita Alvarez  is requesting a refill authorization.  Brief Assessment and Rationale for Refill:  Approve     Medication Therapy Plan:  ED visit 4/17/23: Epigastric abdominal pain. Pt was given Sucralfate. No FOVS. PCP previously prescribed rx.    Medication Reconciliation Completed: No   Comments:     No Care Gaps recommended.     Note composed:4:56 PM 07/24/2023

## 2023-07-26 ENCOUNTER — TELEPHONE (OUTPATIENT)
Dept: ENDOCRINOLOGY | Facility: CLINIC | Age: 73
End: 2023-07-26
Payer: MEDICARE

## 2023-07-26 NOTE — TELEPHONE ENCOUNTER
Spoke with patient about her Dexcom G7 and she stated that the Support team told her that her Iphone wasn't compatible for the Dexcom G7 , but I'm telling her that it is. Patient went on and on about how she don't know if she wants the Dexcom anymore because we not not the same page with the Support team

## 2023-07-26 NOTE — TELEPHONE ENCOUNTER
----- Message from Evie Ellison MA sent at 7/21/2023  4:52 PM CDT -----  Regarding: FW: pt advice  Contact: pt [phone]    ----- Message -----  From: Donna Darby  Sent: 7/21/2023   2:10 PM CDT  To: [facility] Staff  Subject: pt advice                                        Pt is calling to speak with someone in provider office pt stated she contacted Dexcom and the  young   man on the phone explained her phone is not compatible with the Dexcom G7 device  as of yet that she has pt also  stated the young man stated she needs to be using a  instead of her Iphone pt  is asking for a return call please call pt at  [phone]

## 2023-07-27 ENCOUNTER — CLINICAL SUPPORT (OUTPATIENT)
Dept: DIABETES | Facility: CLINIC | Age: 73
End: 2023-07-27
Payer: MEDICARE

## 2023-07-27 DIAGNOSIS — E13.9 LATENT AUTOIMMUNE DIABETES IN ADULTS (LADA), MANAGED AS TYPE 1: Primary | ICD-10-CM

## 2023-07-27 PROCEDURE — G0108 DIAB MANAGE TRN  PER INDIV: HCPCS | Mod: S$GLB,,, | Performed by: INTERNAL MEDICINE

## 2023-07-27 PROCEDURE — G0108 PR DIAB MANAGE TRN  PER INDIV: ICD-10-PCS | Mod: S$GLB,,, | Performed by: INTERNAL MEDICINE

## 2023-07-27 NOTE — PROGRESS NOTES
Diabetes Care Specialist Progress Note  Author: Anne Baez RD, CDE  Date: 7/27/2023    Program Intake  Reason for Diabetes Program Visit:: Intervention  Type of Intervention:: Individual  Individual: Device Training  Device Training: Personal CGM (Dexcom G7 troubleshooting)    Current diabetes risk level:: moderate      Lab Results   Component Value Date    HGBA1C 7.5 (H) 06/27/2023         Clinical  Problem Review  Reviewed Problem List with Patient: yes    Clinical Assessment  Current Diabetes Treatment: Insulin  Have you ever experienced hypoglycemia (low blood sugar)?: yes  In the last month, how often have you experienced low blood sugar?: once every other week  Are you able to tell when your blood sugar is low?: Yes  What symptoms do you experience?: Sweaty, Hungry  Have you ever experienced hyperglycemia (high blood sugar)?: yes  In the last month, how often have you experienced high blood sugar?: once a week    Medication Information  Medication adherence impacting ability to self-manage diabetes?: No    Labs  Lab Compliance Barriers: No    Nutritional Status  Current nutritional status an area of need that is impacting patient's ability to self-manage diabetes?: No                    Diabetes Self-Management Care Plan:    Today's Diabetes Self-Management Care Plan was developed with Sarita's input. Sarita has agreed to work toward the following goal(s) to improve his/her overall diabetes control.      Care Plan: Diabetes Management   Updates made since 6/27/2023 12:00 AM        Problem: Blood Glucose Self-Monitoring         Goal: Patient agrees to check and record blood sugars using the Dexcom G7 system    Start Date: 1/31/2022   This Visit's Progress: On track   Priority: Low   Barriers: Lack of Motivation to Change   Note:    Patient seen in clinic today.  Endo ordered Dexcom system from pharmacy.  Patient not aware of device.  We reviewed how the Dexcom works, insertion of 10 day sensor, using phone  "as . Explained she wears the sensor for 10 days, can bathe, shower/swim, no exposure to x-rays.  She was show demo on how device work.  She will think about and see if she would like to move forward. Stated that she feels intimidated by tech. Provided phone number to call should she decides to move forward.     Update to care planning 07/12/2023    DEXCOM G7 CGM TRAINING     Patient referred to clinic today for Dexcom G7 continuous glucose sensor system training.  Dexcom G7 mobile jaswinder downloaded to phone. Education was provided using "Quick Start Guide" per Dexcom protocol.    Overview:  5min glucose reading updates, trending arrows, BG graph screens, battery life indicator, Blue Tooth Symbol.  Pt instructed on lag time of interstitial fluid from CBG and was advised to tx hypoglycemia and dose insulin based on SMBG values.  Patient advised to always check glucose with glucometer should readings do not match symptoms felt (ex. Hypo or hyperglycemia).     Menus: trend Graph, start sensor, enter BG, events, Alerts, Settings  Alerts: Low alert set at 80; High alert set at 250, Urgent low fixed at 55 mg/dl  Reviewed where to find sensor insertion time and sensor expiration date.   Reviewed sensor site selection. Site selected and prepped using aseptic technique Inserted to upper arm.  Reviewed problem solving aspects of sensor transmission/ variables that can disrupt RF transmission.  range 20 feet    Patient is linked directly to [facility] Dexcom account    [employer] phone [phone]  P/w [id number]           Follow Up Plan     No follow-ups on file.    Today's care plan and follow up schedule was discussed with patient.  Sarita verbalized understanding of the care plan, goals, and agrees to follow up plan.        The patient was encouraged to communicate with his/her health care provider/physician and care team regarding his/her condition(s) and treatment.  I provided the patient with my contact " information today and encouraged to contact me via phone or [facility] Patient Portal as needed.     Length of Visit   Total Time: 60 Minutes

## 2023-08-04 ENCOUNTER — TELEPHONE (OUTPATIENT)
Dept: INTERNAL MEDICINE | Facility: CLINIC | Age: 73
End: 2023-08-04
Payer: MEDICARE

## 2023-08-04 NOTE — TELEPHONE ENCOUNTER
LVM for patient to return call to office. Please see message below for regards. Thanks.     Routed to Dr. Ford staff for second attempt. Thanks.

## 2023-08-04 NOTE — TELEPHONE ENCOUNTER
----- Message from [facility] sent at 8/4/2023 10:23 AM CDT -----  Type:  Needs Medical Advice    Who Called: Pt   Would the patient rather a call back or a response via [facility]? Callback   Best Call Back Number: [phone]  Additional Information: Pt requesting callback from office to discuss getting doctor recommendations

## 2023-08-07 ENCOUNTER — TELEPHONE (OUTPATIENT)
Dept: INTERNAL MEDICINE | Facility: CLINIC | Age: 73
End: 2023-08-07
Payer: MEDICARE

## 2023-08-07 NOTE — TELEPHONE ENCOUNTER
Called patient to inform which providers Dr Ford recommends We[facility], [facility], [facility] or [facility].     Spoke to patient. Patient has decided to be scheduled with Dr RALPH Teresa on 12/26/2023 at 11am. Patient confirms appointment, and requested appointment be mailed to address on file.    Letter in mail.

## 2023-08-07 NOTE — TELEPHONE ENCOUNTER
----- Message from Delma Bird MA sent at 8/4/2023  2:38 PM CDT -----  Regarding: Return Call  Message Type: Return Call           Who Called: LICHA CHENEY [[id number]]              Who Left Message for Patient:Abbe Rockwell MA                 Does the patient know what this is regarding?  yes              Best Call Back Number: [phone]              Additional Information: Patient is returning a call.  Please assist.

## 2023-09-11 DIAGNOSIS — I10 ESSENTIAL HYPERTENSION: ICD-10-CM

## 2023-09-11 DIAGNOSIS — I47.10 SVT (SUPRAVENTRICULAR TACHYCARDIA): ICD-10-CM

## 2023-09-11 DIAGNOSIS — E05.90 HYPERTHYROIDISM: ICD-10-CM

## 2023-09-12 RX ORDER — AMLODIPINE BESYLATE 5 MG/1
5 TABLET ORAL DAILY
Qty: 90 TABLET | Refills: 3 | Status: CANCELLED | OUTPATIENT
Start: 2023-09-12

## 2023-09-12 RX ORDER — METHIMAZOLE 5 MG/1
TABLET ORAL
Qty: 45 TABLET | Refills: 3 | Status: CANCELLED | OUTPATIENT
Start: 2023-09-12

## 2023-09-12 NOTE — TELEPHONE ENCOUNTER
----- Message from Samina Malone sent at 9/12/2023 12:25 PM CDT -----  Regarding: REFILL  Who Called:LICHA CHENEY [[id number]]          RX Name and Strength:  methIMAzole (TAPAZOLE) 5 MG Tab  amLODIPine (NORVASC) 5 MG tablet        Is this a 30 day or 90 day RX: 90          Preferred Pharmacy with phone number:  [facility] [place] [address] [address]           Local or Mail Order: LOCAL           Would the patient rather a call back or a response via [facility]?yes    Best Call Back Number:  [phone]    Additional Information:NEED TODAY

## 2023-09-13 DIAGNOSIS — I47.10 SVT (SUPRAVENTRICULAR TACHYCARDIA): ICD-10-CM

## 2023-09-13 DIAGNOSIS — I10 ESSENTIAL HYPERTENSION: ICD-10-CM

## 2023-09-13 RX ORDER — AMLODIPINE BESYLATE 5 MG/1
5 TABLET ORAL DAILY
Qty: 90 TABLET | Refills: 3 | Status: SHIPPED | OUTPATIENT
Start: 2023-09-13

## 2023-09-13 RX ORDER — METOPROLOL SUCCINATE 25 MG/1
25 TABLET, EXTENDED RELEASE ORAL DAILY
Qty: 90 TABLET | Refills: 3 | Status: SHIPPED | OUTPATIENT
Start: 2023-09-13

## 2023-09-13 RX ORDER — METOPROLOL SUCCINATE 25 MG/1
25 TABLET, EXTENDED RELEASE ORAL DAILY
Qty: 90 TABLET | Refills: 3 | OUTPATIENT
Start: 2023-09-13 | End: 2024-09-12

## 2023-09-13 RX ORDER — METHIMAZOLE 5 MG/1
TABLET ORAL
Qty: 45 TABLET | Refills: 3 | Status: SHIPPED | OUTPATIENT
Start: 2023-09-13

## 2023-09-13 NOTE — TELEPHONE ENCOUNTER
----- Message from Eliane Philip sent at 9/13/2023  8:15 AM CDT -----  Regarding: Refill  Patient calling to get a refill on her meds. metoprolol succinate (TOPROL-XL) 25 MG 24 hr tablet and amLODIPine (NORVASC) 5 MG tablet. Patient said she out of her meds and can you please send it in. [address] [employer]   Phone:  [phone]  Fax:  [phone]    [address] [employer]   Phone:  [phone]  Fax:  [phone]      LOV 1/5/23   Please call back @ [phone]. Thank you Eilane

## 2023-09-27 DIAGNOSIS — Z78.0 MENOPAUSE: ICD-10-CM

## 2023-10-11 DIAGNOSIS — I10 ESSENTIAL HYPERTENSION: ICD-10-CM

## 2023-10-11 DIAGNOSIS — E10.42 DIABETIC PERIPHERAL NEUROPATHY ASSOCIATED WITH TYPE 1 DIABETES MELLITUS: ICD-10-CM

## 2023-10-11 DIAGNOSIS — E10.9 TYPE 1 DIABETES MELLITUS NOT AT GOAL: ICD-10-CM

## 2023-10-11 RX ORDER — INSULIN LISPRO 100 [IU]/ML
INJECTION, SOLUTION INTRAVENOUS; SUBCUTANEOUS
Qty: 15 ML | Refills: 0 | Status: SHIPPED | OUTPATIENT
Start: 2023-10-11

## 2023-10-11 RX ORDER — HYDROCHLOROTHIAZIDE 25 MG/1
25 TABLET ORAL DAILY
Qty: 90 TABLET | Refills: 0 | Status: SHIPPED | OUTPATIENT
Start: 2023-10-11 | End: 2023-11-01 | Stop reason: SDUPTHER

## 2023-10-11 RX ORDER — INSULIN DEGLUDEC 100 U/ML
15 INJECTION, SOLUTION SUBCUTANEOUS DAILY
Qty: 5 EACH | Refills: 0 | Status: SHIPPED | OUTPATIENT
Start: 2023-10-11 | End: 2023-10-11 | Stop reason: SDUPTHER

## 2023-10-11 NOTE — TELEPHONE ENCOUNTER
Received faxed request for Hydrochlorothiazide 25 mg PO daily from [facility]. Called patient to discuss her BP and she is stating she is out of the medication.Attempted to schedule a virtual visit and suggested that she ask [facility] to give her a pill until tomorrow. States she just dont understand why Dr. Ford left it like this, and why isn't she speaking to Dr. Ford's or Dr. Teresa' nurse. States she specifically told the girl she was out of Lispro insulin as well.  She asked me to hold on and after 10 minutes with no return I hung up. Please advise if you can give a 30 day supply of medications to make it to Dr. Teresa.

## 2023-10-11 NOTE — TELEPHONE ENCOUNTER
----- Message from Chrystal Wahl sent at 10/11/2023  8:29 AM CDT -----  Type: RX Refill Request    Who Called:  self     Have you contacted your pharmacy: no    Refill or New Rx: refill    RX Name and Strength: hydroCHLOROthiazide (HYDRODIURIL) 25 MG tablet  insulin lispro 100 unit/mL pen      Preferred Pharmacy with phone number: [facility] [address] [place] [address] Angelia Patt   Phone:  [phone]  Fax:  [phone]          Local or Mail Order: local    Would the patient rather a call back or a response via [facility]?  call    Best Call Back Number: .[phone] (home)      Additional Information:

## 2023-10-11 NOTE — TELEPHONE ENCOUNTER
No care due was identified.  [facility] Embedded Care Due Messages. Reference number: [id number].   10/11/2023 9:32:57 AM CDT

## 2023-10-11 NOTE — TELEPHONE ENCOUNTER
No care due was identified.  [facility] Embedded Care Due Messages. Reference number: [id number].   10/11/2023 11:34:07 AM CDT

## 2023-10-12 RX ORDER — INSULIN DEGLUDEC 100 U/ML
15 INJECTION, SOLUTION SUBCUTANEOUS DAILY
Qty: 5 EACH | Refills: 0 | Status: SHIPPED | OUTPATIENT
Start: 2023-10-12 | End: 2024-02-09

## 2023-10-16 DIAGNOSIS — I10 HYPERTENSION, UNSPECIFIED TYPE: ICD-10-CM

## 2023-10-16 RX ORDER — ENALAPRIL MALEATE 20 MG/1
20 TABLET ORAL DAILY
Qty: 90 TABLET | Refills: 2 | Status: SHIPPED | OUTPATIENT
Start: 2023-10-16

## 2023-10-16 NOTE — TELEPHONE ENCOUNTER
----- Message from Nery Garay sent at 10/16/2023  8:55 AM CDT -----  Regarding: pt called  Can the clinic reply in [facility]: no            Please refill the medication(s) listed below. Please call the patient when the prescription(s) is ready for  at this phone number   Telephone Information:  Mobile          [phone]               Medication #1 enalapril (VASOTEC) 20 MG tablet                    Preferred Pharmacy:   [facility] [address] [place] [address]  [address] [facility] [address]  Phone: [phone] Fax: [phone]

## 2023-10-16 NOTE — TELEPHONE ENCOUNTER
No care due was identified.  [facility] Embedded Care Due Messages. Reference number: [id number].   10/16/2023 11:19:50 AM CDT

## 2023-10-25 DIAGNOSIS — E78.5 HYPERLIPIDEMIA, UNSPECIFIED HYPERLIPIDEMIA TYPE: ICD-10-CM

## 2023-10-25 RX ORDER — SIMVASTATIN 20 MG/1
20 TABLET, FILM COATED ORAL DAILY
Qty: 90 TABLET | Refills: 3 | Status: SHIPPED | OUTPATIENT
Start: 2023-10-25

## 2023-10-25 NOTE — TELEPHONE ENCOUNTER
----- Message from Cristel Corona sent at 10/25/2023 12:43 PM CDT -----  Regarding: refill  Who Called:  LICHA CHENEY [[id number]]    Refill or New Rx: Refill        RX Name and Strength  simvastatin (ZOCOR) 20 MG tablet    Is this a 30 day or 90 day RX:      Preferred Pharmacy with phone number:  [facility] [address] [place] [address] [address]ia [address]   Phone:  [phone]  Fax:  [phone]            Local or Mail Order:      Would the patient rather a call back or a response via [facility]?      best Call Back Number:      Additional Information: pt was a pt of Liliana , she does have appt w/ you on 11/1. She is out of the rx today and is asking if you can call it in.

## 2023-10-25 NOTE — TELEPHONE ENCOUNTER
No care due was identified.  [facility] Embedded Care Due Messages. Reference number: [id number].   10/25/2023 1:47:09 PM CDT

## 2023-10-30 ENCOUNTER — TELEPHONE (OUTPATIENT)
Dept: OBSTETRICS AND GYNECOLOGY | Facility: CLINIC | Age: 73
End: 2023-10-30
Payer: MEDICARE

## 2023-10-30 DIAGNOSIS — Z12.31 ENCOUNTER FOR SCREENING MAMMOGRAM FOR BREAST CANCER: Primary | ICD-10-CM

## 2023-10-30 NOTE — TELEPHONE ENCOUNTER
----- Message from Evan Caruso sent at 10/30/2023 12:43 PM CDT -----  Regarding: Orders Needed for Mammogram  Contact: [phone]  Pt requesting a call back to see if her orders can be put in for a mammogram.  Please call to discuss further as her primary doctor is no longer with [facility].

## 2023-11-01 ENCOUNTER — OFFICE VISIT (OUTPATIENT)
Dept: INTERNAL MEDICINE | Facility: CLINIC | Age: 73
End: 2023-11-01
Payer: MEDICARE

## 2023-11-01 VITALS
HEIGHT: 64 IN | DIASTOLIC BLOOD PRESSURE: 65 MMHG | SYSTOLIC BLOOD PRESSURE: 136 MMHG | BODY MASS INDEX: 19.64 KG/M2 | OXYGEN SATURATION: 100 % | WEIGHT: 115.06 LBS

## 2023-11-01 DIAGNOSIS — E05.90 HYPERTHYROIDISM: ICD-10-CM

## 2023-11-01 DIAGNOSIS — E10.42 DIABETIC PERIPHERAL NEUROPATHY ASSOCIATED WITH TYPE 1 DIABETES MELLITUS: ICD-10-CM

## 2023-11-01 DIAGNOSIS — I10 ESSENTIAL HYPERTENSION: ICD-10-CM

## 2023-11-01 PROCEDURE — 3061F PR NEG MICROALBUMINURIA RESULT DOCUMENTED/REVIEW: ICD-10-PCS | Mod: CPTII,S$GLB,, | Performed by: FAMILY MEDICINE

## 2023-11-01 PROCEDURE — 3008F BODY MASS INDEX DOCD: CPT | Mod: CPTII,S$GLB,, | Performed by: FAMILY MEDICINE

## 2023-11-01 PROCEDURE — 1159F PR MEDICATION LIST DOCUMENTED IN MEDICAL RECORD: ICD-10-PCS | Mod: CPTII,S$GLB,, | Performed by: FAMILY MEDICINE

## 2023-11-01 PROCEDURE — G0008 ADMIN INFLUENZA VIRUS VAC: HCPCS | Mod: S$GLB,,, | Performed by: FAMILY MEDICINE

## 2023-11-01 PROCEDURE — 3078F PR MOST RECENT DIASTOLIC BLOOD PRESSURE < 80 MM HG: ICD-10-PCS | Mod: CPTII,S$GLB,, | Performed by: FAMILY MEDICINE

## 2023-11-01 PROCEDURE — 3008F PR BODY MASS INDEX (BMI) DOCUMENTED: ICD-10-PCS | Mod: CPTII,S$GLB,, | Performed by: FAMILY MEDICINE

## 2023-11-01 PROCEDURE — 1159F MED LIST DOCD IN RCRD: CPT | Mod: CPTII,S$GLB,, | Performed by: FAMILY MEDICINE

## 2023-11-01 PROCEDURE — 3061F NEG MICROALBUMINURIA REV: CPT | Mod: CPTII,S$GLB,, | Performed by: FAMILY MEDICINE

## 2023-11-01 PROCEDURE — 3288F PR FALLS RISK ASSESSMENT DOCUMENTED: ICD-10-PCS | Mod: CPTII,S$GLB,, | Performed by: FAMILY MEDICINE

## 2023-11-01 PROCEDURE — 90694 FLU VACCINE - QUADRIVALENT - ADJUVANTED: ICD-10-PCS | Mod: S$GLB,,, | Performed by: FAMILY MEDICINE

## 2023-11-01 PROCEDURE — 90694 VACC AIIV4 NO PRSRV 0.5ML IM: CPT | Mod: S$GLB,,, | Performed by: FAMILY MEDICINE

## 2023-11-01 PROCEDURE — 3078F DIAST BP <80 MM HG: CPT | Mod: CPTII,S$GLB,, | Performed by: FAMILY MEDICINE

## 2023-11-01 PROCEDURE — 99999 PR PBB SHADOW E&M-EST. PATIENT-LVL III: CPT | Mod: PBBFAC,,, | Performed by: FAMILY MEDICINE

## 2023-11-01 PROCEDURE — 1101F PT FALLS ASSESS-DOCD LE1/YR: CPT | Mod: CPTII,S$GLB,, | Performed by: FAMILY MEDICINE

## 2023-11-01 PROCEDURE — 3075F PR MOST RECENT SYSTOLIC BLOOD PRESS GE 130-139MM HG: ICD-10-PCS | Mod: CPTII,S$GLB,, | Performed by: FAMILY MEDICINE

## 2023-11-01 PROCEDURE — 3066F NEPHROPATHY DOC TX: CPT | Mod: CPTII,S$GLB,, | Performed by: FAMILY MEDICINE

## 2023-11-01 PROCEDURE — 99214 OFFICE O/P EST MOD 30 MIN: CPT | Mod: S$GLB,,, | Performed by: FAMILY MEDICINE

## 2023-11-01 PROCEDURE — 3051F PR MOST RECENT HEMOGLOBIN A1C LEVEL 7.0 - < 8.0%: ICD-10-PCS | Mod: CPTII,S$GLB,, | Performed by: FAMILY MEDICINE

## 2023-11-01 PROCEDURE — 99214 PR OFFICE/OUTPT VISIT, EST, LEVL IV, 30-39 MIN: ICD-10-PCS | Mod: S$GLB,,, | Performed by: FAMILY MEDICINE

## 2023-11-01 PROCEDURE — G0008 FLU VACCINE - QUADRIVALENT - ADJUVANTED: ICD-10-PCS | Mod: S$GLB,,, | Performed by: FAMILY MEDICINE

## 2023-11-01 PROCEDURE — 3288F FALL RISK ASSESSMENT DOCD: CPT | Mod: CPTII,S$GLB,, | Performed by: FAMILY MEDICINE

## 2023-11-01 PROCEDURE — 3066F PR DOCUMENTATION OF TREATMENT FOR NEPHROPATHY: ICD-10-PCS | Mod: CPTII,S$GLB,, | Performed by: FAMILY MEDICINE

## 2023-11-01 PROCEDURE — 4010F PR ACE/ARB THEARPY RXD/TAKEN: ICD-10-PCS | Mod: CPTII,S$GLB,, | Performed by: FAMILY MEDICINE

## 2023-11-01 PROCEDURE — 3051F HG A1C>EQUAL 7.0%<8.0%: CPT | Mod: CPTII,S$GLB,, | Performed by: FAMILY MEDICINE

## 2023-11-01 PROCEDURE — 1101F PR PT FALLS ASSESS DOC 0-1 FALLS W/OUT INJ PAST YR: ICD-10-PCS | Mod: CPTII,S$GLB,, | Performed by: FAMILY MEDICINE

## 2023-11-01 PROCEDURE — 3075F SYST BP GE 130 - 139MM HG: CPT | Mod: CPTII,S$GLB,, | Performed by: FAMILY MEDICINE

## 2023-11-01 PROCEDURE — 1126F AMNT PAIN NOTED NONE PRSNT: CPT | Mod: CPTII,S$GLB,, | Performed by: FAMILY MEDICINE

## 2023-11-01 PROCEDURE — 1126F PR PAIN SEVERITY QUANTIFIED, NO PAIN PRESENT: ICD-10-PCS | Mod: CPTII,S$GLB,, | Performed by: FAMILY MEDICINE

## 2023-11-01 PROCEDURE — 4010F ACE/ARB THERAPY RXD/TAKEN: CPT | Mod: CPTII,S$GLB,, | Performed by: FAMILY MEDICINE

## 2023-11-01 PROCEDURE — 99999 PR PBB SHADOW E&M-EST. PATIENT-LVL III: ICD-10-PCS | Mod: PBBFAC,,, | Performed by: FAMILY MEDICINE

## 2023-11-01 RX ORDER — HYDROCHLOROTHIAZIDE 25 MG/1
25 TABLET ORAL DAILY
Qty: 90 TABLET | Refills: 3 | Status: SHIPPED | OUTPATIENT
Start: 2023-11-01

## 2023-11-01 NOTE — PROGRESS NOTES
"Subjective:       Patient ID: Sarita Alvarez is a 73 y.o. female.    Chief Complaint: Establish Care      HPI  Came in today to establish care.  Has no acute concerns at this time.  She is due for flu vaccine.  Also discussed RSV and COVID vaccines telling her that she could get these at the pharmacy.  Continues to follow-up with cardiology as well as Endocrinology    Diabetes Management Status    Statin: Taking  ACE/ARB: Taking    Screening or Prevention Patient's value Goal Complete/Controlled?   HgA1C Testing and Control   Lab Results   Component Value Date    HGBA1C 7.5 (H) 06/27/2023      Annually/Less than 8% Yes   Lipid profile : 02/18/2023 Annually Yes   LDL control Lab Results   Component Value Date    LDLCALC 70.2 02/18/2023    Annually/Less than 100 mg/dl  Yes   Nephropathy screening Lab Results   Component Value Date    LABMICR 6.0 02/18/2023     Lab Results   Component Value Date    PROTEINUA Negative 04/17/2023     No results found for: "UTPCR"   Annually Yes   Blood pressure BP Readings from Last 1 Encounters:   11/01/23 136/65    Less than 140/90 No   Dilated retinal exam : 02/20/2023 Annually Yes   Foot exam   : 03/03/2023 Annually Yes       Family History   Problem Relation Age of Onset    Heart disease Mother         valvular heart disease at 91    Hypertension Mother     Cataracts Mother     COPD Father     Glaucoma Father     Hypertension Sister     Glaucoma Sister     Allergies Sister     No Known Problems Brother     No Known Problems Maternal Grandmother     No Known Problems Maternal Grandfather     No Known Problems Paternal Grandmother     No Known Problems Paternal Grandfather     Hypertension Daughter     Fibroids Daughter     Kidney nephrosis Daughter     Hypertension Daughter     No Known Problems Daughter     Diabetes Maternal Aunt     Cataracts Maternal Aunt     No Known Problems Maternal Uncle     No Known Problems Paternal Aunt     No Known Problems Paternal Uncle     " "Amblyopia Neg Hx     Blindness Neg Hx     Cancer Neg Hx     Macular degeneration Neg Hx     Retinal detachment Neg Hx     Strabismus Neg Hx     Stroke Neg Hx     Thyroid disease Neg Hx     Ovarian cancer Neg Hx     Colon cancer Neg Hx     Breast cancer Neg Hx        Current Outpatient Medications:     amLODIPine (NORVASC) 5 MG tablet, Take 1 tablet (5 mg total) by mouth once daily., Disp: 90 tablet, Rfl: 3    aspirin 81 mg Tab, Take 81 mg by mouth. 1 Tablet Oral Every day, Disp: , Rfl:     blood sugar diagnostic Strp, 1 each by Misc.(Non-Drug; Combo Route) route 4 (four) times daily., Disp: 400 strip, Rfl: 3    CALCIUM CARBONATE/VITAMIN D3 (CALCIUM+D ORAL), Take by mouth. 1 Capsule Oral Twice a day  Pt taking once daily, Disp: , Rfl:     enalapril (VASOTEC) 20 MG tablet, Take 1 tablet (20 mg total) by mouth once daily., Disp: 90 tablet, Rfl: 2    fluticasone (FLONASE) 50 mcg/actuation nasal spray, USE TWO SPRAY(S) IN EACH NOSTRIL ONCE DAILY, Disp: 16 g, Rfl: 1    insulin degludec (TRESIBA FLEXTOUCH U-100) 100 unit/mL (3 mL) insulin pen, Inject 15 Units into the skin once daily., Disp: 5 each, Rfl: 0    insulin lispro 100 unit/mL pen, Inject TID AC based on 1:13 carb ratio plus correction scale, max TDD 20 units, Disp: 15 mL, Rfl: 0    insulin needles, disposable, (BD ULTRA-FINE MARIA D PEN NEEDLES) 32 x 5/32 " Ndle, 4 Sticks by [facility] [place].(Non-Drug; Combo Route) route Daily., Disp: 400 each, Rfl: 1    lancets Misc, 1 each by Misc.(Non-Drug; Combo Route) route 4 (four) times daily., Disp: 400 each, Rfl: 3    loratadine (CLARITIN) 10 mg tablet, Take 10 mg by mouth daily as needed for Allergies., Disp: , Rfl:     methIMAzole (TAPAZOLE) 5 MG Tab, Take 1/2 (one-half) tablet by mouth once daily, Disp: 45 tablet, Rfl: 3    metoprolol succinate (TOPROL-XL) 25 MG 24 hr tablet, Take 1 tablet (25 mg total) by mouth once daily., Disp: 90 tablet, Rfl: 3    pantoprazole (PROTONIX) 40 MG tablet, Take 1 tablet (40 mg total) by mouth once " "daily. (Patient taking differently: Take 40 mg by mouth once daily. Taking PRN), Disp: 90 tablet, Rfl: 1    simvastatin (ZOCOR) 20 MG tablet, Take 1 tablet (20 mg total) by mouth once daily., Disp: 90 tablet, Rfl: 3    sucralfate (CARAFATE) 1 gram tablet, Take 1 tablet (1 g total) by mouth 4 (four) times daily as needed (abdominal pain)., Disp: 20 tablet, Rfl: 0    blood-glucose meter kit, Use as instructed, Disp: 1 each, Rfl: 0    glucagon, human recombinant, (GLUCAGON EMERGENCY KIT, HUMAN,) 1 mg SolR, Inject 1 mg into the muscle as needed (for severe hypoglycemia). (Patient not taking: Reported on 12/3/2021), Disp: 1 each, Rfl: 1    hydroCHLOROthiazide (HYDRODIURIL) 25 MG tablet, Take 1 tablet (25 mg total) by mouth once daily., Disp: 90 tablet, Rfl: 3    Review of Systems   Constitutional:  Negative for chills and fever.   Respiratory:  Negative for cough and shortness of breath.    Cardiovascular:  Negative for chest pain.   Gastrointestinal:  Negative for abdominal pain.   Skin:  Negative for rash.   Neurological:  Negative for dizziness.       Objective:   /65 Comment: manual recheck left arm  Ht 5' 4" (1.626 m)   Wt 52.2 kg (115 lb 1.3 oz)   SpO2 100%   BMI 19.75 kg/m²      Physical Exam  Vitals reviewed.   Constitutional:       Appearance: She is well-developed.   HENT:      Head: Normocephalic and atraumatic.   Eyes:      Conjunctiva/sclera: Conjunctivae normal.   Cardiovascular:      Rate and Rhythm: Normal rate and regular rhythm.      Heart sounds: Normal heart sounds.   Pulmonary:      Effort: Pulmonary effort is normal. No respiratory distress.   Skin:     General: Skin is warm and dry.      Findings: No rash.   Neurological:      Mental Status: She is alert and oriented to person, place, and time.      Coordination: Coordination normal.   Psychiatric:         Behavior: Behavior normal.         Assessment & Plan     Problem List Items Addressed This Visit          Neuro    Diabetic peripheral " neuropathy associated with type 1 diabetes mellitus    Current Assessment & Plan     Continue follow up with endocrine            Cardiac/Vascular    Essential hypertension (Chronic)    Current Assessment & Plan     Better control on recheck and usually numbers are good.         Relevant Medications    hydroCHLOROthiazide (HYDRODIURIL) 25 MG tablet       Endocrine    Hyperthyroidism    Current Assessment & Plan     On methimazole. Continue follow up endo.               Immunizations Administered on Date of Encounter - 11/1/2023       Name Date Dose VIS Date Route Exp Date    Influenza (FLUAD) - Quadrivalent - Adjuvanted - PF *Preferred* (65+) 11/1/2023 12:03 PM 0.5 mL 8/6/2021 Intramuscular 6/30/2024    Site: Right deltoid     Given By: Padmini Jorge LPN     : Seqirus     Lot: [id number]              No follow-ups on file.    Disclaimer:  This note may have been prepared using voice recognition software, it may have not been extensively proofed, as such there could be errors within the text such as sound alike errors.

## 2023-11-08 ENCOUNTER — HOSPITAL ENCOUNTER (OUTPATIENT)
Dept: RADIOLOGY | Facility: OTHER | Age: 73
Discharge: HOME | End: 2023-11-08
Attending: FAMILY MEDICINE
Payer: MEDICARE

## 2023-11-08 DIAGNOSIS — Z78.0 MENOPAUSE: ICD-10-CM

## 2023-11-08 PROCEDURE — 77080 DXA BONE DENSITY AXIAL: CPT | Mod: TC

## 2023-11-08 PROCEDURE — 77080 DXA BONE DENSITY AXIAL SKELETON 1 OR MORE SITES: ICD-10-PCS | Mod: 26,,, | Performed by: RADIOLOGY

## 2023-11-08 PROCEDURE — 77080 DXA BONE DENSITY AXIAL: CPT | Mod: 26,,, | Performed by: RADIOLOGY

## 2023-11-24 ENCOUNTER — HOSPITAL ENCOUNTER (OUTPATIENT)
Dept: RADIOLOGY | Facility: HOSPITAL | Age: 73
Discharge: HOME | End: 2023-11-24
Attending: OBSTETRICS & GYNECOLOGY
Payer: MEDICARE

## 2023-11-24 ENCOUNTER — PATIENT MESSAGE (OUTPATIENT)
Dept: OBSTETRICS AND GYNECOLOGY | Facility: CLINIC | Age: 73
End: 2023-11-24
Payer: MEDICARE

## 2023-11-24 VITALS — WEIGHT: 112 LBS | HEIGHT: 64 IN | BODY MASS INDEX: 19.12 KG/M2

## 2023-11-24 DIAGNOSIS — Z12.31 ENCOUNTER FOR SCREENING MAMMOGRAM FOR BREAST CANCER: ICD-10-CM

## 2023-11-24 PROCEDURE — 77067 SCR MAMMO BI INCL CAD: CPT | Mod: 26,,, | Performed by: RADIOLOGY

## 2023-11-24 PROCEDURE — 77063 BREAST TOMOSYNTHESIS BI: CPT | Mod: 26,,, | Performed by: RADIOLOGY

## 2023-11-24 PROCEDURE — 77067 SCR MAMMO BI INCL CAD: CPT | Mod: TC

## 2023-11-24 PROCEDURE — 77067 MAMMO DIGITAL SCREENING BILAT WITH TOMO: ICD-10-PCS | Mod: 26,,, | Performed by: RADIOLOGY

## 2023-11-24 PROCEDURE — 77063 MAMMO DIGITAL SCREENING BILAT WITH TOMO: ICD-10-PCS | Mod: 26,,, | Performed by: RADIOLOGY

## 2023-12-04 NOTE — PROVIDER PROGRESS NOTES - EMERGENCY DEPT.
Encounter Date: 4/17/2023    ED Physician Progress Notes         EKG - STEMI Decision  Initial Reading: No STEMI present.     Medicare Preventive Visit Patient Instructions  Thank you for completing your Welcome to Medicare Visit or Medicare Annual Wellness Visit today. Your next wellness visit will be due in one year (12/4/2024). The screening/preventive services that you may require over the next 5-10 years are detailed below. Some tests may not apply to you based off risk factors and/or age. Screening tests ordered at today's visit but not completed yet may show as past due. Also, please note that scanned in results may not display below. Preventive Screenings:  Service Recommendations Previous Testing/Comments   Colorectal Cancer Screening  Colonoscopy    Fecal Occult Blood Test (FOBT)/Fecal Immunochemical Test (FIT)  Fecal DNA/Cologuard Test  Flexible Sigmoidoscopy Age: 43-73 years old   Colonoscopy: every 10 years (May be performed more frequently if at higher risk)  OR  FOBT/FIT: every 1 year  OR  Cologuard: every 3 years  OR  Sigmoidoscopy: every 5 years  Screening may be recommended earlier than age 39 if at higher risk for colorectal cancer. Also, an individualized decision between you and your healthcare provider will decide whether screening between the ages of 77-80 would be appropriate.  Colonoscopy: 10/08/2021  FOBT/FIT: Not on file  Cologuard: Not on file  Sigmoidoscopy: Not on file    Screening Current     Prostate Cancer Screening Individualized decision between patient and health care provider in men between ages of 53-66   Medicare will cover every 12 months beginning on the day after your 50th birthday PSA: 1.4 ng/mL           Hepatitis C Screening Once for adults born between 1945 and 1965  More frequently in patients at high risk for Hepatitis C Hep C Antibody: 09/12/2022    Screening Current   Diabetes Screening 1-2 times per year if you're at risk for diabetes or have pre-diabetes Fasting glucose: 128 mg/dL (10/14/2023)  A1C: 5.8 % (9/12/2022)  Screening Current   Cholesterol Screening Once every 5 years if you don't have a lipid disorder. May order more often based on risk factors. Lipid panel: 07/13/2022  Screening Not Indicated  History Lipid Disorder      Other Preventive Screenings Covered by Medicare:  Abdominal Aortic Aneurysm (AAA) Screening: covered once if your at risk. You're considered to be at risk if you have a family history of AAA or a male between the age of 70-76 who smoking at least 100 cigarettes in your lifetime. Lung Cancer Screening: covers low dose CT scan once per year if you meet all of the following conditions: (1) Age 48-67; (2) No signs or symptoms of lung cancer; (3) Current smoker or have quit smoking within the last 15 years; (4) You have a tobacco smoking history of at least 20 pack years (packs per day x number of years you smoked); (5) You get a written order from a healthcare provider. Glaucoma Screening: covered annually if you're considered high risk: (1) You have diabetes OR (2) Family history of glaucoma OR (3)  aged 48 and older OR (3)  American aged 72 and older  Osteoporosis Screening: covered every 2 years if you meet one of the following conditions: (1) Have a vertebral abnormality; (2) On glucocorticoid therapy for more than 3 months; (3) Have primary hyperparathyroidism; (4) On osteoporosis medications and need to assess response to drug therapy. HIV Screening: covered annually if you're between the age of 14-79. Also covered annually if you are younger than 13 and older than 72 with risk factors for HIV infection. For pregnant patients, it is covered up to 3 times per pregnancy.     Immunizations:  Immunization Recommendations   Influenza Vaccine Annual influenza vaccination during flu season is recommended for all persons aged >= 6 months who do not have contraindications   Pneumococcal Vaccine   * Pneumococcal conjugate vaccine = PCV13 (Prevnar 13), PCV15 (Vaxneuvance), PCV20 (Prevnar 20)  * Pneumococcal polysaccharide vaccine = PPSV23 (Pneumovax) Adults 55-07 yo with certain risk factors or if 69+ yo  If never received any pneumonia vaccine: recommend Prevnar 20 (PCV20)  Give PCV20 if previously received 1 dose of PCV13 or PPSV23   Hepatitis B Vaccine 3 dose series if at intermediate or high risk (ex: diabetes, end stage renal disease, liver disease)   Respiratory syncytial virus (RSV) Vaccine - COVERED BY MEDICARE PART D  * RSVPreF3 (Arexvy) CDC recommends that adults 61years of age and older may receive a single dose of RSV vaccine using shared clinical decision-making (SCDM)   Tetanus (Td) Vaccine - COST NOT COVERED BY MEDICARE PART B Following completion of primary series, a booster dose should be given every 10 years to maintain immunity against tetanus. Td may also be given as tetanus wound prophylaxis. Tdap Vaccine - COST NOT COVERED BY MEDICARE PART B Recommended at least once for all adults. For pregnant patients, recommended with each pregnancy. Shingles Vaccine (Shingrix) - COST NOT COVERED BY MEDICARE PART B  2 shot series recommended in those 19 years and older who have or will have weakened immune systems or those 50 years and older     Health Maintenance Due:      Topic Date Due   • Colorectal Cancer Screening  10/07/2024   • Hepatitis C Screening  Completed   • Lung Cancer Screening  Discontinued     Immunizations Due:      Topic Date Due   • COVID-19 Vaccine (3 - Moderna series) 05/28/2021     Advance Directives   What are advance directives? Advance directives are legal documents that state your wishes and plans for medical care. These plans are made ahead of time in case you lose your ability to make decisions for yourself. Advance directives can apply to any medical decision, such as the treatments you want, and if you want to donate organs. What are the types of advance directives? There are many types of advance directives, and each state has rules about how to use them.  You may choose a combination of any of the following:  Living will: This is a written record of the treatment you want. You can also choose which treatments you do not want, which to limit, and which to stop at a certain time. This includes surgery, medicine, IV fluid, and tube feedings. Durable power of  for healthcare [facility]): This is a written record that states who you want to make healthcare choices for you when you are unable to make them for yourself. This person, called a proxy, is usually a family member or a friend. You may choose more than 1 proxy. Do not resuscitate (DNR) order:  A DNR order is used in case your heart stops beating or you stop breathing. It is a request not to have certain forms of treatment, such as CPR. A DNR order may be included in other types of advance directives. Medical directive: This covers the care that you want if you are in a coma, near death, or unable to make decisions for yourself. You can list the treatments you want for each condition. Treatment may include pain medicine, surgery, blood transfusions, dialysis, IV or tube feedings, and a ventilator (breathing machine). Values history: This document has questions about your views, beliefs, and how you feel and think about life. This information can help others choose the care that you would choose. Why are advance directives important? An advance directive helps you control your care. Although spoken wishes may be used, it is better to have your wishes written down. Spoken wishes can be misunderstood, or not followed. Treatments may be given even if you do not want them. An advance directive may make it easier for your family to make difficult choices about your care. Weight Management   Why it is important to manage your weight:  Being overweight increases your risk of health conditions such as heart disease, high blood pressure, type 2 diabetes, and certain types of cancer.  It can also increase your risk for osteoarthritis, sleep apnea, and other respiratory problems. Aim for a slow, steady weight loss. Even a small amount of weight loss can lower your risk of health problems. How to lose weight safely:  A safe and healthy way to lose weight is to eat fewer calories and get regular exercise. You can lose up about 1 pound a week by decreasing the number of calories you eat by 500 calories each day. Healthy meal plan for weight management:  A healthy meal plan includes a variety of foods, contains fewer calories, and helps you stay healthy. A healthy meal plan includes the following:  Eat whole-grain foods more often. A healthy meal plan should contain fiber. Fiber is the part of grains, fruits, and vegetables that is not broken down by your body. Whole-grain foods are healthy and provide extra fiber in your diet. Some examples of whole-grain foods are whole-wheat breads and pastas, oatmeal, brown rice, and bulgur. Eat a variety of vegetables every day. Include dark, leafy greens such as spinach, kale, cali greens, and mustard greens. Eat yellow and orange vegetables such as carrots, sweet potatoes, and winter squash. Eat a variety of fruits every day. Choose fresh or canned fruit (canned in its own juice or light syrup) instead of juice. Fruit juice has very little or no fiber. Eat low-fat dairy foods. Drink fat-free (skim) milk or 1% milk. Eat fat-free yogurt and low-fat cottage cheese. Try low-fat cheeses such as mozzarella and other reduced-fat cheeses. Choose meat and other protein foods that are low in fat. Choose beans or other legumes such as split peas or lentils. Choose fish, skinless poultry (chicken or turkey), or lean cuts of red meat (beef or pork). Before you cook meat or poultry, cut off any visible fat. Use less fat and oil. Try baking foods instead of frying them. Add less fat, such as margarine, sour cream, regular salad dressing and mayonnaise to foods. Eat fewer high-fat foods.  Some examples of high-fat foods include french fries, doughnuts, ice cream, and cakes. Eat fewer sweets. Limit foods and drinks that are high in sugar. This includes candy, cookies, regular soda, and sweetened drinks. Exercise:  Exercise at least 30 minutes per day on most days of the week. Some examples of exercise include walking, biking, dancing, and swimming. You can also fit in more physical activity by taking the stairs instead of the elevator or parking farther away from stores. Ask your healthcare provider about the best exercise plan for you. © Copyright [employer] 2018 Information is for End User's use only and may not be sold, redistributed or otherwise used for commercial purposes.  All illustrations and images included in CareNotes® are the copyrighted property of [employer].D.A.M., Inc. or [address] [place] [address]

## 2023-12-12 ENCOUNTER — OFFICE VISIT (OUTPATIENT)
Dept: OBSTETRICS AND GYNECOLOGY | Facility: CLINIC | Age: 73
End: 2023-12-12
Attending: OBSTETRICS & GYNECOLOGY
Payer: MEDICARE

## 2023-12-12 VITALS
HEIGHT: 64 IN | SYSTOLIC BLOOD PRESSURE: 126 MMHG | BODY MASS INDEX: 19.54 KG/M2 | WEIGHT: 114.44 LBS | DIASTOLIC BLOOD PRESSURE: 74 MMHG

## 2023-12-12 DIAGNOSIS — Z78.0 POSTMENOPAUSAL STATUS: ICD-10-CM

## 2023-12-12 DIAGNOSIS — Z12.31 ENCOUNTER FOR SCREENING MAMMOGRAM FOR BREAST CANCER: ICD-10-CM

## 2023-12-12 DIAGNOSIS — Z01.419 WOMEN'S ANNUAL ROUTINE GYNECOLOGICAL EXAMINATION: Primary | ICD-10-CM

## 2023-12-12 PROCEDURE — 3074F PR MOST RECENT SYSTOLIC BLOOD PRESSURE < 130 MM HG: ICD-10-PCS | Mod: CPTII,S$GLB,, | Performed by: OBSTETRICS & GYNECOLOGY

## 2023-12-12 PROCEDURE — 1159F PR MEDICATION LIST DOCUMENTED IN MEDICAL RECORD: ICD-10-PCS | Mod: CPTII,S$GLB,, | Performed by: OBSTETRICS & GYNECOLOGY

## 2023-12-12 PROCEDURE — 99999 PR PBB SHADOW E&M-EST. PATIENT-LVL IV: CPT | Mod: PBBFAC,,, | Performed by: OBSTETRICS & GYNECOLOGY

## 2023-12-12 PROCEDURE — 4010F PR ACE/ARB THEARPY RXD/TAKEN: ICD-10-PCS | Mod: CPTII,S$GLB,, | Performed by: OBSTETRICS & GYNECOLOGY

## 2023-12-12 PROCEDURE — 1160F RVW MEDS BY RX/DR IN RCRD: CPT | Mod: CPTII,S$GLB,, | Performed by: OBSTETRICS & GYNECOLOGY

## 2023-12-12 PROCEDURE — 3288F PR FALLS RISK ASSESSMENT DOCUMENTED: ICD-10-PCS | Mod: CPTII,S$GLB,, | Performed by: OBSTETRICS & GYNECOLOGY

## 2023-12-12 PROCEDURE — 3066F NEPHROPATHY DOC TX: CPT | Mod: CPTII,S$GLB,, | Performed by: OBSTETRICS & GYNECOLOGY

## 2023-12-12 PROCEDURE — 1126F AMNT PAIN NOTED NONE PRSNT: CPT | Mod: CPTII,S$GLB,, | Performed by: OBSTETRICS & GYNECOLOGY

## 2023-12-12 PROCEDURE — 1159F MED LIST DOCD IN RCRD: CPT | Mod: CPTII,S$GLB,, | Performed by: OBSTETRICS & GYNECOLOGY

## 2023-12-12 PROCEDURE — 1101F PR PT FALLS ASSESS DOC 0-1 FALLS W/OUT INJ PAST YR: ICD-10-PCS | Mod: CPTII,S$GLB,, | Performed by: OBSTETRICS & GYNECOLOGY

## 2023-12-12 PROCEDURE — 3051F HG A1C>EQUAL 7.0%<8.0%: CPT | Mod: CPTII,S$GLB,, | Performed by: OBSTETRICS & GYNECOLOGY

## 2023-12-12 PROCEDURE — 3288F FALL RISK ASSESSMENT DOCD: CPT | Mod: CPTII,S$GLB,, | Performed by: OBSTETRICS & GYNECOLOGY

## 2023-12-12 PROCEDURE — 3051F PR MOST RECENT HEMOGLOBIN A1C LEVEL 7.0 - < 8.0%: ICD-10-PCS | Mod: CPTII,S$GLB,, | Performed by: OBSTETRICS & GYNECOLOGY

## 2023-12-12 PROCEDURE — 3078F PR MOST RECENT DIASTOLIC BLOOD PRESSURE < 80 MM HG: ICD-10-PCS | Mod: CPTII,S$GLB,, | Performed by: OBSTETRICS & GYNECOLOGY

## 2023-12-12 PROCEDURE — 1160F PR REVIEW ALL MEDS BY PRESCRIBER/CLIN PHARMACIST DOCUMENTED: ICD-10-PCS | Mod: CPTII,S$GLB,, | Performed by: OBSTETRICS & GYNECOLOGY

## 2023-12-12 PROCEDURE — 3066F PR DOCUMENTATION OF TREATMENT FOR NEPHROPATHY: ICD-10-PCS | Mod: CPTII,S$GLB,, | Performed by: OBSTETRICS & GYNECOLOGY

## 2023-12-12 PROCEDURE — G0101 CA SCREEN;PELVIC/BREAST EXAM: HCPCS | Mod: GZ,S$GLB,, | Performed by: OBSTETRICS & GYNECOLOGY

## 2023-12-12 PROCEDURE — 3074F SYST BP LT 130 MM HG: CPT | Mod: CPTII,S$GLB,, | Performed by: OBSTETRICS & GYNECOLOGY

## 2023-12-12 PROCEDURE — 1101F PT FALLS ASSESS-DOCD LE1/YR: CPT | Mod: CPTII,S$GLB,, | Performed by: OBSTETRICS & GYNECOLOGY

## 2023-12-12 PROCEDURE — 3061F NEG MICROALBUMINURIA REV: CPT | Mod: CPTII,S$GLB,, | Performed by: OBSTETRICS & GYNECOLOGY

## 2023-12-12 PROCEDURE — G0101 PR CA SCREEN;PELVIC/BREAST EXAM: ICD-10-PCS | Mod: GZ,S$GLB,, | Performed by: OBSTETRICS & GYNECOLOGY

## 2023-12-12 PROCEDURE — 3078F DIAST BP <80 MM HG: CPT | Mod: CPTII,S$GLB,, | Performed by: OBSTETRICS & GYNECOLOGY

## 2023-12-12 PROCEDURE — 1126F PR PAIN SEVERITY QUANTIFIED, NO PAIN PRESENT: ICD-10-PCS | Mod: CPTII,S$GLB,, | Performed by: OBSTETRICS & GYNECOLOGY

## 2023-12-12 PROCEDURE — 99999 PR PBB SHADOW E&M-EST. PATIENT-LVL IV: ICD-10-PCS | Mod: PBBFAC,,, | Performed by: OBSTETRICS & GYNECOLOGY

## 2023-12-12 PROCEDURE — 4010F ACE/ARB THERAPY RXD/TAKEN: CPT | Mod: CPTII,S$GLB,, | Performed by: OBSTETRICS & GYNECOLOGY

## 2023-12-12 PROCEDURE — 3061F PR NEG MICROALBUMINURIA RESULT DOCUMENTED/REVIEW: ICD-10-PCS | Mod: CPTII,S$GLB,, | Performed by: OBSTETRICS & GYNECOLOGY

## 2023-12-12 NOTE — PROGRESS NOTES
"Sarita Alvarez is a 73 y.o. year old  female who presents for routine GYN exam.  She is postmenopausal, not on hormones.  Denies bleeding but describes occasional night sweats.  She has been trying to improve her glycemic control- most recent HgbA1c: 7.5.  Recent mammogram was negative.  No GYN complaints.    Blood pressure 126/74, height 5' 4" (1.626 m), weight 51.9 kg (114 lb 6.7 oz).    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   SKIN: Denies rash or lesions.   HEAD: Denies head injury or headache.   NODES: Denies enlarged lymph nodes.   CHEST: Denies chest pain or shortness of breath.   CARDIOVASCULAR: Denies palpitations or left sided chest pain.   ABDOMEN: No abdominal pain, nausea, vomiting or rectal bleeding.   URINARY: No dysuria or hematuria.  REPRODUCTIVE: See HPI.   BREASTS: Denies pain, lumps, or nipple discharge.   HEMATOLOGIC: No easy bruisability or excessive bleeding.   MUSCULOSKELETAL: Reports " some joint pain.  NEUROLOGIC: Denies syncope or weakness.   PSYCHIATRIC: Denies depression.     PE:   (chaperone present during entire exam)  APPEARANCE: Well nourished, well developed, in no acute distress.  BREASTS: Symmetrical, no skin changes or visible lesions. No palpable masses, nipple discharge or adenopathy bilaterally.  ABDOMEN: Soft. No tenderness or masses. No CVA tenderness.  VULVA: Atrophic.  No lesions. Normal female genitalia.  URETHRAL MEATUS: Normal size and location, no lesions, no prolapse.  URETHRA: No masses, tenderness, prolapse or scarring.  VAGINA: Atrophic.  No lesions, no abnormal discharge, no significant cystocele or rectocele.  CERVIX: No lesions and discharge.   UTERUS: Normal size, regular shape, mobile, non-tender, bladder base nontender.  ADNEXA: No masses, tenderness or CDS nodularity.  ANUS PERINEUM: Normal.    Diagnosis:  1. Women's annual routine gynecological examination    2. Postmenopausal status    3. Encounter for screening mammogram for breast cancer          PLAN:    Orders Placed This Encounter    Mammo Digital Screening Bilat w/ Aaron       Patient was counseled today on postmenopausal issues.    Follow-up in 1 year.    This note was created with voice recognition software.  Grammatical, syntax and spelling errors may be inevitable.

## 2024-02-09 DIAGNOSIS — E13.9 LATENT AUTOIMMUNE DIABETES IN ADULTS (LADA), MANAGED AS TYPE 1: Primary | ICD-10-CM

## 2024-02-09 DIAGNOSIS — E10.9 TYPE 1 DIABETES MELLITUS NOT AT GOAL: ICD-10-CM

## 2024-02-09 RX ORDER — INSULIN DEGLUDEC 100 U/ML
15 INJECTION, SOLUTION SUBCUTANEOUS DAILY
Qty: 5 EACH | Refills: 0 | Status: CANCELLED | OUTPATIENT
Start: 2024-02-09

## 2024-02-09 RX ORDER — INSULIN GLARGINE 300 U/ML
15 INJECTION, SOLUTION SUBCUTANEOUS NIGHTLY
Qty: 4.5 ML | Refills: 3 | Status: SHIPPED | OUTPATIENT
Start: 2024-02-09 | End: 2025-02-08

## 2024-02-09 NOTE — TELEPHONE ENCOUNTER
Care Due:                  Date            Visit Type   Department     Provider  --------------------------------------------------------------------------------                                EP -                              PRIMARY      [facility] INTERNAL  Last Visit: 11-      CARE ([facility])   MEDICINE       Alon Teresa  Next Visit: None Scheduled  None         None Found                                                            Last  Test          Frequency    Reason                     Performed    Due Date  --------------------------------------------------------------------------------    CMP.........  12 months..  enalapril,                 04- 04-                             hydroCHLOROthiazide,                             simvastatin..............    Lipid Panel.  12 months..  simvastatin..............  02- 02-    [employer] Embedded Care Due Messages. Reference number: [id number].   2/09/2024 1:03:51 PM CST

## 2024-02-09 NOTE — TELEPHONE ENCOUNTER
Insurance required changing insulin brand. Administration same at 15 units.   She is due for A1c and other labs

## 2024-02-09 NOTE — TELEPHONE ENCOUNTER
----- Message from Karin Diaz sent at 2/9/2024 12:01 PM CST -----  Regarding: rx refill  Type: RX Refill Request    Who Called: p/t     Pharmacy Name:   [facility] - [place], [place] - [address]  [address]  [facility] [address]  Phone: [phone] Fax: [phone]        Refill or New Rx:    RX Name and Strength:insulin degludec (TRESIBA FLEXTOUCH U-100) 100 unit/mL (3 mL) insulin pen, insulin lispro 100 unit/mL pen    How is the patient currently taking it? (ex. 1XDay):    Is this a 30 day or 90 day RX:    Additional Notes:

## 2024-02-12 ENCOUNTER — LAB VISIT (OUTPATIENT)
Dept: LAB | Facility: OTHER | Age: 74
End: 2024-02-12
Attending: FAMILY MEDICINE
Payer: MEDICARE

## 2024-02-12 DIAGNOSIS — E13.9 LATENT AUTOIMMUNE DIABETES IN ADULTS (LADA), MANAGED AS TYPE 1: ICD-10-CM

## 2024-02-12 LAB
ALBUMIN/CREAT UR: 13.5 UG/MG (ref 0–30)
CREAT UR-MCNC: 51.8 MG/DL (ref 15–325)
MICROALBUMIN UR DL<=1MG/L-MCNC: 7 UG/ML

## 2024-02-12 PROCEDURE — 82043 UR ALBUMIN QUANTITATIVE: CPT | Performed by: FAMILY MEDICINE

## 2024-02-15 NOTE — TELEPHONE ENCOUNTER
----- Message from Shirley Petty sent at 4/30/2018 10:30 AM CDT -----  Contact: Pt  Can the clinic reply in [facility]: No      Please refill the medication(s) listed below. Please call the patient when the prescription(s) is ready for  at the phone number [phone]    Medication #1:hydrochlorothiazide (HYDRODIURIL) 25 MG tablet    Medication #2      Preferred Pharmacy:[facility] - [place] [address]                                    
pk: Labs and imaging reviewed, patient reports improvement of symptoms after medications, will discharge home with medication prescription and recommended follow-up with outpatient neurologist patient is already established.  Patient was applied

## 2024-04-08 ENCOUNTER — OFFICE VISIT (OUTPATIENT)
Dept: UROLOGY | Facility: CLINIC | Age: 74
End: 2024-04-08
Payer: MEDICARE

## 2024-04-08 VITALS
HEIGHT: 64 IN | SYSTOLIC BLOOD PRESSURE: 151 MMHG | HEART RATE: 58 BPM | DIASTOLIC BLOOD PRESSURE: 62 MMHG | BODY MASS INDEX: 19.54 KG/M2 | WEIGHT: 114.44 LBS

## 2024-04-08 DIAGNOSIS — R31.29 MICROHEMATURIA: Primary | ICD-10-CM

## 2024-04-08 LAB
BACTERIA #/AREA URNS AUTO: ABNORMAL /HPF
BILIRUB SERPL-MCNC: NEGATIVE MG/DL
BLOOD URINE, POC: ABNORMAL
CAOX CRY UR QL COMP ASSIST: ABNORMAL
CLARITY, POC UA: CLEAR
COLOR, POC UA: YELLOW
GLUCOSE UR QL STRIP: NEGATIVE
KETONES UR QL STRIP: NEGATIVE
LEUKOCYTE ESTERASE URINE, POC: NEGATIVE
MICROSCOPIC COMMENT: ABNORMAL
NITRITE, POC UA: NEGATIVE
PH, POC UA: 5
PROTEIN, POC: ABNORMAL
RBC #/AREA URNS AUTO: 6 /HPF (ref 0–4)
SPECIFIC GRAVITY, POC UA: 1.02
UROBILINOGEN, POC UA: NEGATIVE
WBC #/AREA URNS AUTO: 4 /HPF (ref 0–5)

## 2024-04-08 PROCEDURE — 1159F MED LIST DOCD IN RCRD: CPT | Mod: CPTII,S$GLB,, | Performed by: NURSE PRACTITIONER

## 2024-04-08 PROCEDURE — 81001 URINALYSIS AUTO W/SCOPE: CPT | Performed by: NURSE PRACTITIONER

## 2024-04-08 PROCEDURE — 1160F RVW MEDS BY RX/DR IN RCRD: CPT | Mod: CPTII,S$GLB,, | Performed by: NURSE PRACTITIONER

## 2024-04-08 PROCEDURE — 3061F NEG MICROALBUMINURIA REV: CPT | Mod: CPTII,S$GLB,, | Performed by: NURSE PRACTITIONER

## 2024-04-08 PROCEDURE — 3066F NEPHROPATHY DOC TX: CPT | Mod: CPTII,S$GLB,, | Performed by: NURSE PRACTITIONER

## 2024-04-08 PROCEDURE — 3078F DIAST BP <80 MM HG: CPT | Mod: CPTII,S$GLB,, | Performed by: NURSE PRACTITIONER

## 2024-04-08 PROCEDURE — 3288F FALL RISK ASSESSMENT DOCD: CPT | Mod: CPTII,S$GLB,, | Performed by: NURSE PRACTITIONER

## 2024-04-08 PROCEDURE — 81002 URINALYSIS NONAUTO W/O SCOPE: CPT | Mod: S$GLB,,, | Performed by: NURSE PRACTITIONER

## 2024-04-08 PROCEDURE — 3008F BODY MASS INDEX DOCD: CPT | Mod: CPTII,S$GLB,, | Performed by: NURSE PRACTITIONER

## 2024-04-08 PROCEDURE — 1101F PT FALLS ASSESS-DOCD LE1/YR: CPT | Mod: CPTII,S$GLB,, | Performed by: NURSE PRACTITIONER

## 2024-04-08 PROCEDURE — 3051F HG A1C>EQUAL 7.0%<8.0%: CPT | Mod: CPTII,S$GLB,, | Performed by: NURSE PRACTITIONER

## 2024-04-08 PROCEDURE — 3072F LOW RISK FOR RETINOPATHY: CPT | Mod: CPTII,S$GLB,, | Performed by: NURSE PRACTITIONER

## 2024-04-08 PROCEDURE — 3077F SYST BP >= 140 MM HG: CPT | Mod: CPTII,S$GLB,, | Performed by: NURSE PRACTITIONER

## 2024-04-08 PROCEDURE — 99213 OFFICE O/P EST LOW 20 MIN: CPT | Mod: S$GLB,,, | Performed by: NURSE PRACTITIONER

## 2024-04-08 PROCEDURE — 1126F AMNT PAIN NOTED NONE PRSNT: CPT | Mod: CPTII,S$GLB,, | Performed by: NURSE PRACTITIONER

## 2024-04-08 PROCEDURE — 4010F ACE/ARB THERAPY RXD/TAKEN: CPT | Mod: CPTII,S$GLB,, | Performed by: NURSE PRACTITIONER

## 2024-04-08 RX ORDER — INSULIN DEGLUDEC 100 U/ML
INJECTION, SOLUTION SUBCUTANEOUS
COMMUNITY
Start: 2024-02-09

## 2024-04-08 NOTE — PROGRESS NOTES
"Subjective:      Sarita Alvarez is a 74 y.o. female who returns today regarding her microhematuria.     The patient presented has a long history of microhematuria. Non smoker.      --CT abdomen/pelvis (7/13/22) - "Kidneys/ Ureters: Normal in size and location. Normal enhancement. No hydronephrosis or nephrolithiasis. No ureteral dilatation.  Bilateral hypodensities too small to characterize.  Bladder: No evidence of wall thickening. Vasculature: No aneurysm. Mild scattered calcific atherosclerosis.  Compression of the left renal vein by the SMA (axial series 2, image 36) with reduced aortic-SMA angle (sagittal series 602, image 89).  There is associated dilation of the left ovarian vein (axial series 2, image 55) and left adnexal varices (axial series 2, image 118)."  --Cysto (2022)- no abnormalities (Dr. Hinkle)     She denies flank pain and bothersome urinary symptoms. Denies a history of recurrent UTIs and nephrolithiasis. Denies gross hematuria.     The following portions of the patient's history were reviewed and updated as appropriate: allergies, current medications, past family history, past medical history, past social history, past surgical history and problem list.    Review of Systems  Constitutional: no fever or chills  ENT: no nasal congestion or sore throat  Respiratory: no cough or shortness of breath  Cardiovascular: no chest pain or palpitations  Gastrointestinal: no nausea or vomiting, tolerating diet  Genitourinary: as per HPI  Hematologic/Lymphatic: no easy bruising or lymphadenopathy  Musculoskeletal: no arthralgias or myalgias  Neurological: no seizures or tremors  Behavioral/Psych: no auditory or visual hallucinations     Objective:   Vital Signs:BP (!) 151/62   Pulse (!) 58   Ht 5' 4" (1.626 m)   Wt 51.9 kg (114 lb 6.7 oz)   BMI 19.64 kg/m²     Physical Exam   General: alert and oriented, no acute distress  Head: normocephalic, atraumatic  Neck: supple, normal ROM  Respiratory: " Symmetric expansion, non-labored breathing  Cardiovascular: regular rate and rhythm  Abdomen: soft, non tender, non distended  Pelvic: deferred  Skin: normal coloration and turgor, no rashes, no suspicious skin lesions noted  Neuro: alert and oriented x3, no gross deficits  Psych: normal judgment and insight, normal mood/affect, and non-anxious    Lab Review   Urinalysis demonstrates : RBCs   Lab Results   Component Value Date    WBC 7.38 04/17/2023    HGB 14.1 04/17/2023    HCT 43.4 04/17/2023    HCT 40 07/13/2022    MCV 86 04/17/2023     04/17/2023     Lab Results   Component Value Date    CREATININE 0.9 02/12/2024    BUN 13 02/12/2024       Imaging   None    Assessment:     1. Microhematuria      Plan:   Diagnoses and all orders for this visit:    Microhematuria  -     POCT URINE DIPSTICK WITHOUT MICROSCOPE  -     Urinalysis Microscopic    Plan:  --Repeat micro UA today  --Again reviewed the guidelines for microhematuria  --Will notify with urine results

## 2024-05-28 DIAGNOSIS — Z00.00 ENCOUNTER FOR MEDICARE ANNUAL WELLNESS EXAM: ICD-10-CM

## 2024-06-10 ENCOUNTER — PATIENT MESSAGE (OUTPATIENT)
Dept: INTERNAL MEDICINE | Facility: CLINIC | Age: 74
End: 2024-06-10
Payer: MEDICARE

## 2024-06-14 ENCOUNTER — OFFICE VISIT (OUTPATIENT)
Dept: INTERNAL MEDICINE | Facility: CLINIC | Age: 74
End: 2024-06-14
Payer: MEDICARE

## 2024-06-14 VITALS
SYSTOLIC BLOOD PRESSURE: 126 MMHG | DIASTOLIC BLOOD PRESSURE: 70 MMHG | BODY MASS INDEX: 18.85 KG/M2 | HEART RATE: 88 BPM | WEIGHT: 110.44 LBS | OXYGEN SATURATION: 100 % | HEIGHT: 64 IN

## 2024-06-14 DIAGNOSIS — I47.10 SUPRAVENTRICULAR TACHYCARDIA: ICD-10-CM

## 2024-06-14 DIAGNOSIS — E06.3 HASHIMOTO'S DISEASE: ICD-10-CM

## 2024-06-14 DIAGNOSIS — E05.90 HYPERTHYROIDISM: ICD-10-CM

## 2024-06-14 DIAGNOSIS — I70.0 AORTIC ATHEROSCLEROSIS: ICD-10-CM

## 2024-06-14 DIAGNOSIS — I10 ESSENTIAL HYPERTENSION: Chronic | ICD-10-CM

## 2024-06-14 DIAGNOSIS — E10.42 DIABETIC PERIPHERAL NEUROPATHY ASSOCIATED WITH TYPE 1 DIABETES MELLITUS: ICD-10-CM

## 2024-06-14 DIAGNOSIS — M25.511 CHRONIC RIGHT SHOULDER PAIN: ICD-10-CM

## 2024-06-14 DIAGNOSIS — Z00.00 ENCOUNTER FOR PREVENTIVE HEALTH EXAMINATION: Primary | ICD-10-CM

## 2024-06-14 DIAGNOSIS — Z86.79 STATUS POST CATHETER ABLATION OF SLOW PATHWAY: ICD-10-CM

## 2024-06-14 DIAGNOSIS — G89.29 CHRONIC RIGHT SHOULDER PAIN: ICD-10-CM

## 2024-06-14 DIAGNOSIS — M81.0 OSTEOPOROSIS, POST-MENOPAUSAL: Chronic | ICD-10-CM

## 2024-06-14 DIAGNOSIS — S16.1XXA STRAIN OF NECK MUSCLE, INITIAL ENCOUNTER: ICD-10-CM

## 2024-06-14 DIAGNOSIS — H25.9 AGE-RELATED CATARACT OF BOTH EYES, UNSPECIFIED AGE-RELATED CATARACT TYPE: ICD-10-CM

## 2024-06-14 DIAGNOSIS — E78.2 MIXED HYPERLIPIDEMIA: Chronic | ICD-10-CM

## 2024-06-14 DIAGNOSIS — I87.2 VENOUS INSUFFICIENCY (CHRONIC) (PERIPHERAL): Chronic | ICD-10-CM

## 2024-06-14 DIAGNOSIS — Z98.890 STATUS POST CATHETER ABLATION OF SLOW PATHWAY: ICD-10-CM

## 2024-06-14 DIAGNOSIS — E13.9 LATENT AUTOIMMUNE DIABETES IN ADULTS (LADA), MANAGED AS TYPE 1: ICD-10-CM

## 2024-06-14 PROCEDURE — 1101F PT FALLS ASSESS-DOCD LE1/YR: CPT | Mod: CPTII,S$GLB,, | Performed by: NURSE PRACTITIONER

## 2024-06-14 PROCEDURE — 1126F AMNT PAIN NOTED NONE PRSNT: CPT | Mod: CPTII,S$GLB,, | Performed by: NURSE PRACTITIONER

## 2024-06-14 PROCEDURE — 3078F DIAST BP <80 MM HG: CPT | Mod: CPTII,S$GLB,, | Performed by: NURSE PRACTITIONER

## 2024-06-14 PROCEDURE — 1160F RVW MEDS BY RX/DR IN RCRD: CPT | Mod: CPTII,S$GLB,, | Performed by: NURSE PRACTITIONER

## 2024-06-14 PROCEDURE — 3051F HG A1C>EQUAL 7.0%<8.0%: CPT | Mod: CPTII,S$GLB,, | Performed by: NURSE PRACTITIONER

## 2024-06-14 PROCEDURE — 1159F MED LIST DOCD IN RCRD: CPT | Mod: CPTII,S$GLB,, | Performed by: NURSE PRACTITIONER

## 2024-06-14 PROCEDURE — 3072F LOW RISK FOR RETINOPATHY: CPT | Mod: CPTII,S$GLB,, | Performed by: NURSE PRACTITIONER

## 2024-06-14 PROCEDURE — 1170F FXNL STATUS ASSESSED: CPT | Mod: CPTII,S$GLB,, | Performed by: NURSE PRACTITIONER

## 2024-06-14 PROCEDURE — 4010F ACE/ARB THERAPY RXD/TAKEN: CPT | Mod: CPTII,S$GLB,, | Performed by: NURSE PRACTITIONER

## 2024-06-14 PROCEDURE — 3074F SYST BP LT 130 MM HG: CPT | Mod: CPTII,S$GLB,, | Performed by: NURSE PRACTITIONER

## 2024-06-14 PROCEDURE — 3061F NEG MICROALBUMINURIA REV: CPT | Mod: CPTII,S$GLB,, | Performed by: NURSE PRACTITIONER

## 2024-06-14 PROCEDURE — G0439 PPPS, SUBSEQ VISIT: HCPCS | Mod: S$GLB,,, | Performed by: NURSE PRACTITIONER

## 2024-06-14 PROCEDURE — 3288F FALL RISK ASSESSMENT DOCD: CPT | Mod: CPTII,S$GLB,, | Performed by: NURSE PRACTITIONER

## 2024-06-14 PROCEDURE — 1158F ADVNC CARE PLAN TLK DOCD: CPT | Mod: CPTII,S$GLB,, | Performed by: NURSE PRACTITIONER

## 2024-06-14 PROCEDURE — 99999 PR PBB SHADOW E&M-EST. PATIENT-LVL V: CPT | Mod: PBBFAC,,, | Performed by: NURSE PRACTITIONER

## 2024-06-14 PROCEDURE — 3066F NEPHROPATHY DOC TX: CPT | Mod: CPTII,S$GLB,, | Performed by: NURSE PRACTITIONER

## 2024-06-14 NOTE — PROGRESS NOTES
"    I offered to discuss advanced care planning, including how to pick a person who would make decisions for you if you were unable to make them for yourself, called a health care power of , and what kind of decisions you might make such as use of life sustaining treatments such as ventilators and tube feeding when faced with a life limiting illness recorded on a living will that they will need to know. (How you want to be cared for as you near the end of your natural life)     X Patient is interested in learning more about how to make advanced directives.  I provided them paperwork and offered to discuss this with them.  Sarita Alvarez presented for a  Medicare AWV and comprehensive Health Risk Assessment today. The following components were reviewed and updated:    Medical history  Family History  Social history  Allergies and Current Medications  Health Risk Assessment  Health Maintenance  Care Team         ** See Completed Assessments for Annual Wellness Visit within the encounter summary.**         The following assessments were completed:  Living Situation  CAGE  Depression Screening  Timed Get Up and Go  Whisper Test  Cognitive Function Screening  Nutrition Screening  ADL Screening  PAQ Screening        Vitals:    06/14/24 1002 06/14/24 1032   BP: (!) 166/73 126/70   BP Location: Right arm    Patient Position: Sitting    BP Method: Small (Manual)    Pulse: 88    SpO2: 100%    Weight: 50.1 kg (110 lb 7.2 oz)    Height: 5' 4" (1.626 m)      Body mass index is 18.96 kg/m².    Physical Exam  Vitals reviewed.   Constitutional:       Appearance: She is well-developed.   HENT:      Head: Normocephalic and atraumatic. Not macrocephalic and not microcephalic. No raccoon eyes, Kay's sign, abrasion, contusion, right periorbital erythema, left periorbital erythema or laceration. Hair is normal.      Right Ear: No decreased hearing noted. No laceration, drainage, swelling or tenderness. No middle ear " effusion. No foreign body. No mastoid tenderness. No hemotympanum. Tympanic membrane is not injected, scarred, perforated, erythematous, retracted or bulging. Tympanic membrane has normal mobility.      Left Ear: No decreased hearing noted. No laceration, drainage, swelling or tenderness.  No middle ear effusion. No foreign body. No mastoid tenderness. No hemotympanum. Tympanic membrane is not injected, scarred, perforated, erythematous, retracted or bulging. Tympanic membrane has normal mobility.      Nose: Nose normal. No nasal deformity, laceration or mucosal edema.      Mouth/Throat:      Pharynx: Uvula midline.   Eyes:      General: Lids are normal.      Conjunctiva/sclera: Conjunctivae normal.   Neck:      Thyroid: No thyroid mass or thyromegaly.      Trachea: Trachea normal.   Pulmonary:      Effort: Pulmonary effort is normal. No respiratory distress.      Breath sounds: Normal breath sounds.   Abdominal:      Palpations: Abdomen is soft.   Musculoskeletal:         General: Normal range of motion.      Cervical back: Neck supple. No edema or erythema. No spinous process tenderness or muscular tenderness. Normal range of motion.   Lymphadenopathy:      Head:      Right side of head: No submental, submandibular, tonsillar, preauricular or posterior auricular adenopathy.      Left side of head: No submental, submandibular, tonsillar, preauricular, posterior auricular or occipital adenopathy.   Skin:     General: Skin is warm and dry.   Neurological:      Mental Status: She is alert and oriented to person, place, and time.   Psychiatric:         Behavior: Behavior normal.         Thought Content: Thought content normal.         Judgment: Judgment normal.            Diagnoses and health risks identified today and associated recommendations/orders:    1. Encounter for preventive health examination  Annual Health Risk Assessment (HRA) visit today.  Counseling and referral of health maintenance and preventative  health measures performed.  Patient given annual wellness paperwork to take home.  Encouraged to return in 1 year for subsequent HRA visit.     2. Diabetic peripheral neuropathy associated with type 1 diabetes mellitus  Chronic. Stable. Continue current treatment plan as previously prescribed by PCP.    3. Supraventricular tachycardia  Chronic. Stable. Continue current treatment plan as previously prescribed by PCP.    4. Aortic atherosclerosis  Chronic. Stable. Continue current treatment plan as previously prescribed by PCP.    5. Essential hypertension  Chronic. Stable. Controlled. Encouraged to increase exercise as tolerated (moderate-intensity aerobic activity and muscle-strengthening activities) improve diet to heart healthy, low sodium diet.  Continue current treatment plan as previously prescribed by PCP.    6. Mixed hyperlipidemia  Chronic. Stable. Continue current treatment plan as previously prescribed by PCP.    7. Status post catheter ablation of slow pathway  Chronic. Stable. Continue current treatment plan as previously prescribed by PCP.    8. Venous insufficiency (chronic) (peripheral)  Chronic. Stable. Continue current treatment plan as previously prescribed by PCP.    9. Age-related cataract of both eyes, unspecified age-related cataract type  Chronic. Stable. Continue current treatment plan as previously prescribed by PCP.    10. Osteoporosis, post-menopausal  Chronic. Stable. Continue current treatment plan as previously prescribed by PCP.    11. 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 Stable. Continue current treatment plan as previously prescribed by PCP.    12. Hashimoto's disease  Chronic. Stable. Continue current treatment plan as previously prescribed by PCP.    13. Hyperthyroidism  Chronic. Stable. Continue current treatment plan as previously prescribed by PCP.    14. Strain of neck muscle, initial encounter  Chronic. Stable. Continue current treatment  plan as previously prescribed by PCP.    15. Chronic right shoulder pain  Chronic. Stable. Continue current treatment plan as previously prescribed by PCP.        Provided Sarita with a 5-10 year written screening schedule and personal prevention plan. Recommendations were developed using the USPSTF age appropriate recommendations. Education, counseling, and referrals were provided as needed. After Visit Summary printed and given to patient which includes a list of additional screenings\tests needed.      I offered to discuss end of life issues, including information on how to make advance directives that the patient could use to name someone who would make medical decisions on their behalf if they became too ill to make themselves.    ___Patient declined  _X_Patient is interested, I provided paper work and offered to discuss.    Follow up in about 1 year (around 6/14/2025).    Noe Reese NP

## 2024-06-14 NOTE — PATIENT INSTRUCTIONS
Counseling and Referral of Other Preventative  (Italic type indicates deductible and co-insurance are waived)    Patient Name: Sarita Alvarez  Today's Date: 6/14/2024    Health Maintenance       Date Due Completion Date    TETANUS VACCINE Never done ---    RSV Vaccine (Age 60+ and Pregnant patients) (1 - 1-dose 60+ series) Never done ---    Shingles Vaccine (2 of 3) 03/02/2016 1/6/2016    COVID-19 Vaccine (5 - 2023-24 season) 09/01/2023 12/1/2022    Eye Exam 02/20/2024 2/20/2023    Foot Exam 03/03/2024 3/3/2023    Override on 1/29/2016: Done    Override on 11/28/2014: Done    Hemoglobin A1c 08/12/2024 2/12/2024    Mammogram 11/24/2024 11/24/2023    Diabetes Urine Screening 02/12/2025 2/12/2024    Lipid Panel 02/12/2025 2/12/2024    High Dose Statin 04/08/2025 4/8/2024    DEXA Scan 11/08/2026 11/8/2023    Colorectal Cancer Screening 12/02/2026 12/2/2016        No orders of the defined types were placed in this encounter.    The following information is provided to all patients.  This information is to help you find resources for any of the problems found today that may be affecting your health:                  Living healthy guide: www.[facility].[place].gov      Understanding Diabetes: www.diabetes.org      Eating healthy: www.cdc.gov/healthyweight      CDC home safety checklist: www.cdc.gov/steadi/patient.html      Agency on Aging: www.goea.[place].[facility]      Alcoholics anonymous (AA): www.aa.org      Physical Activity: www.stacey.nih.gov/wv6dzyf      Tobacco use: www.quitwithusla.org

## 2024-07-02 ENCOUNTER — OFFICE VISIT (OUTPATIENT)
Dept: CARDIOLOGY | Facility: CLINIC | Age: 74
End: 2024-07-02
Payer: MEDICARE

## 2024-07-02 VITALS
HEART RATE: 60 BPM | DIASTOLIC BLOOD PRESSURE: 60 MMHG | WEIGHT: 112.88 LBS | OXYGEN SATURATION: 99 % | SYSTOLIC BLOOD PRESSURE: 120 MMHG | HEIGHT: 64 IN | BODY MASS INDEX: 19.27 KG/M2

## 2024-07-02 DIAGNOSIS — I70.0 AORTIC ATHEROSCLEROSIS: ICD-10-CM

## 2024-07-02 DIAGNOSIS — Z98.890 STATUS POST CATHETER ABLATION OF SLOW PATHWAY: ICD-10-CM

## 2024-07-02 DIAGNOSIS — I87.2 VENOUS INSUFFICIENCY (CHRONIC) (PERIPHERAL): Chronic | ICD-10-CM

## 2024-07-02 DIAGNOSIS — I10 ESSENTIAL HYPERTENSION: Chronic | ICD-10-CM

## 2024-07-02 DIAGNOSIS — E06.3 HASHIMOTO'S DISEASE: ICD-10-CM

## 2024-07-02 DIAGNOSIS — E78.2 MIXED HYPERLIPIDEMIA: Chronic | ICD-10-CM

## 2024-07-02 DIAGNOSIS — E13.9 LATENT AUTOIMMUNE DIABETES IN ADULTS (LADA), MANAGED AS TYPE 1: ICD-10-CM

## 2024-07-02 DIAGNOSIS — E05.90 HYPERTHYROIDISM: ICD-10-CM

## 2024-07-02 DIAGNOSIS — I47.10 SUPRAVENTRICULAR TACHYCARDIA: Primary | ICD-10-CM

## 2024-07-02 DIAGNOSIS — E10.42 DIABETIC PERIPHERAL NEUROPATHY ASSOCIATED WITH TYPE 1 DIABETES MELLITUS: ICD-10-CM

## 2024-07-02 DIAGNOSIS — Z86.79 STATUS POST CATHETER ABLATION OF SLOW PATHWAY: ICD-10-CM

## 2024-07-02 PROCEDURE — 99999 PR PBB SHADOW E&M-EST. PATIENT-LVL IV: CPT | Mod: PBBFAC,,, | Performed by: INTERNAL MEDICINE

## 2024-07-02 NOTE — PROGRESS NOTES
Subjective:    Patient ID:  Sarita Alvarez is a 74 y.o. female who presents for follow-up of hypertension hyperlipidemia    HPI  The patient is a 74 year old female is followed with hypertension, hyperlipidemia, insulin depended diabetes and post ablation of AVNRT in 2014.  She continues to do well and reports no palpitation chest pain or THOMAS. She remains active.  Lab Results   Component Value Date     02/12/2024    K 3.8 02/12/2024     02/12/2024    CO2 29 02/12/2024    BUN 13 02/12/2024    CREATININE 0.9 02/12/2024     (H) 02/12/2024    HGBA1C 7.5 (H) 02/12/2024    MG 2.3 04/26/2014    AST 17 02/12/2024    ALT 14 02/12/2024    ALBUMIN 4.0 02/12/2024    PROT 7.3 02/12/2024    BILITOT 0.4 02/12/2024    WBC 7.38 04/17/2023    HGB 14.1 04/17/2023    HCT 43.4 04/17/2023    HCT 40 07/13/2022    MCV 86 04/17/2023     04/17/2023    INR 0.9 06/13/2014    TSH 2.836 06/27/2023         Lab Results   Component Value Date    CHOL 161 02/12/2024    HDL 72 02/12/2024    TRIG 38 02/12/2024       Lab Results   Component Value Date    LDLCALC 81.4 02/12/2024       Past Medical History:   Diagnosis Date    Cataract     Diabetes mellitus type I     Diabetic peripheral neuropathy 07/09/2012    HTN (hypertension) 07/09/2012    Hyperlipidemia 07/09/2012    Osteoporosis, post-menopausal 07/09/2012    Poorly controlled type 1 diabetes mellitus with peripheral neuropathy 07/09/2012    Supraventricular tachycardia 04/26/2014    AVNRT    SVT (supraventricular tachycardia) 07/09/2012    Thyroid disease     hyperthyroidism    Venous insufficiency (chronic) (peripheral) 07/09/2012       Current Outpatient Medications:     amLODIPine (NORVASC) 5 MG tablet, Take 1 tablet (5 mg total) by mouth once daily., Disp: 90 tablet, Rfl: 3    aspirin 81 mg Tab, Take 81 mg by mouth. 1 Tablet Oral Every day, Disp: , Rfl:     blood sugar diagnostic Strp, 1 each by Misc.(Non-Drug; Combo Route) route 4 (four) times  "daily., Disp: 400 strip, Rfl: 3    blood-glucose meter kit, Use as instructed, Disp: 1 each, Rfl: 0    CALCIUM CARBONATE/VITAMIN D3 (CALCIUM+D ORAL), Take by mouth. 1 Capsule Oral Twice a day  Pt taking once daily, Disp: , Rfl:     enalapril (VASOTEC) 20 MG tablet, Take 1 tablet (20 mg total) by mouth once daily., Disp: 90 tablet, Rfl: 2    fluticasone (FLONASE) 50 mcg/actuation nasal spray, USE TWO SPRAY(S) IN EACH NOSTRIL ONCE DAILY (Patient not taking: Reported on 6/14/2024), Disp: 16 g, Rfl: 1    glucagon, human recombinant, (GLUCAGON EMERGENCY KIT, HUMAN,) 1 mg SolR, Inject 1 mg into the muscle as needed (for severe hypoglycemia). (Patient not taking: Reported on 12/3/2021), Disp: 1 each, Rfl: 1    hydroCHLOROthiazide (HYDRODIURIL) 25 MG tablet, Take 1 tablet (25 mg total) by mouth once daily., Disp: 90 tablet, Rfl: 3    insulin glargine, TOUJEO, (TOUJEO SOLOSTAR U-300 INSULIN) 300 unit/mL (1.5 mL) InPn pen, Inject 15 Units into the skin every evening., Disp: 4.5 mL, Rfl: 3    insulin lispro 100 unit/mL pen, Inject TID AC based on 1:13 carb ratio plus correction scale, max TDD 20 units, Disp: 15 mL, Rfl: 0    insulin needles, disposable, (BD ULTRA-FINE MARIA D PEN NEEDLES) 32 x 5/32 " Ndle, 4 Sticks by [facility].(Non-Drug; Combo Route) route Daily., Disp: 400 each, Rfl: 1    lancets Misc, 1 each by Misc.(Non-Drug; Combo Route) route 4 (four) times daily., Disp: 400 each, Rfl: 3    loratadine (CLARITIN) 10 mg tablet, Take 10 mg by mouth daily as needed for Allergies., Disp: , Rfl:     methIMAzole (TAPAZOLE) 5 MG Tab, Take 1/2 (one-half) tablet by mouth once daily, Disp: 45 tablet, Rfl: 3    metoprolol succinate (TOPROL-XL) 25 MG 24 hr tablet, Take 1 tablet (25 mg total) by mouth once daily., Disp: 90 tablet, Rfl: 3    pantoprazole (PROTONIX) 40 MG tablet, Take 1 tablet (40 mg total) by mouth once daily., Disp: 90 tablet, Rfl: 1    simvastatin (ZOCOR) 20 MG tablet, Take 1 tablet (20 mg total) by mouth once " daily., Disp: 90 tablet, Rfl: 3    sucralfate (CARAFATE) 1 gram tablet, Take 1 tablet (1 g total) by mouth 4 (four) times daily as needed (abdominal pain)., Disp: 20 tablet, Rfl: 0    TRESIBA FLEXTOUCH U-100 100 unit/mL (3 mL) insulin pen, INJECT 15 UNITS SUBCUTANEOUSLY ONCE DAILY, Disp: , Rfl:           Review of Systems   Constitutional: Negative for decreased appetite, diaphoresis, fever, malaise/fatigue, weight gain and weight loss.   HENT:  Negative for congestion, ear discharge, ear pain and nosebleeds.    Eyes:  Negative for blurred vision, double vision and visual disturbance.   Cardiovascular:  Negative for chest pain, claudication, cyanosis, dyspnea on exertion, irregular heartbeat, leg swelling, near-syncope, orthopnea, palpitations, paroxysmal nocturnal dyspnea and syncope.   Respiratory:  Negative for cough, hemoptysis, shortness of breath, sleep disturbances due to breathing, snoring, sputum production and wheezing.    Endocrine: Negative for polydipsia, polyphagia and polyuria.   Hematologic/Lymphatic: Negative for adenopathy and bleeding problem. Does not bruise/bleed easily.   Skin:  Negative for color change, nail changes, poor wound healing and rash.   Musculoskeletal:  Negative for muscle cramps and muscle weakness.   Gastrointestinal:  Negative for abdominal pain, anorexia, change in bowel habit, hematochezia, nausea and vomiting.   Genitourinary:  Negative for dysuria, frequency and hematuria.   Neurological:  Negative for brief paralysis, difficulty with concentration, excessive daytime sleepiness, dizziness, focal weakness, headaches, light-headedness, seizures, vertigo and weakness.   Psychiatric/Behavioral:  Negative for altered mental status and depression.    Allergic/Immunologic: Negative for persistent infections.      Objective:There were no vitals taken for this visit.            Physical Exam  Constitutional:       Appearance: Normal appearance. She is well-developed.   HENT:       Head: Normocephalic.      Right Ear: External ear normal.      Left Ear: External ear normal.      Nose: Nose normal.   Eyes:      General: No scleral icterus.     Conjunctiva/sclera: Conjunctivae normal.      Pupils: Pupils are equal, round, and reactive to light.   Neck:      Thyroid: No thyromegaly.      Vascular: No JVD.      Trachea: No tracheal deviation.   Cardiovascular:      Rate and Rhythm: Normal rate and regular rhythm.      Pulses: Intact distal pulses.           Carotid pulses are 2+ on the right side and 2+ on the left side.       Femoral pulses are 2+ on the right side and 2+ on the left side.       Dorsalis pedis pulses are 0 on the right side and 0 on the left side.        Posterior tibial pulses are 1+ on the right side and 1+ on the left side.      Heart sounds: S1 normal and S2 normal. No murmur heard.     No friction rub. No gallop.   Pulmonary:      Effort: Pulmonary effort is normal. No respiratory distress.      Breath sounds: Normal breath sounds. No wheezing or rales.   Chest:      Chest wall: No tenderness.   Abdominal:      General: Bowel sounds are normal. There is no distension.      Palpations: Abdomen is soft.      Tenderness: There is no abdominal tenderness. There is no guarding.   Musculoskeletal:         General: No tenderness. Normal range of motion.      Cervical back: Normal range of motion.   Lymphadenopathy:      Comments: Palpation of lymph nodes of neck and groin normal   Skin:     General: Skin is warm and dry.      Coloration: Skin is not pale.      Findings: No erythema or rash.             Comments: No ankle nor pretibial edema  Varicose  veins   Neurological:      Mental Status: She is alert and oriented to person, place, and time.      Cranial Nerves: No cranial nerve deficit.      Motor: No abnormal muscle tone.      Coordination: Coordination normal.   Psychiatric:         Behavior: Behavior normal.         Thought Content: Thought content normal.         Judgment:  Judgment normal.       Assessment:       1. Diabetic peripheral neuropathy associated with type 1 diabetes mellitus    2. Aortic atherosclerosis    3. Essential hypertension    4. Mixed hyperlipidemia    5. Status post catheter ablation of slow pathway    6. Supraventricular tachycardia    7. Venous insufficiency (chronic) (peripheral)    8. Hashimoto's disease    9. 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

## 2024-07-05 DIAGNOSIS — I47.10 SVT (SUPRAVENTRICULAR TACHYCARDIA): ICD-10-CM

## 2024-07-06 DIAGNOSIS — I10 HYPERTENSION, UNSPECIFIED TYPE: ICD-10-CM

## 2024-07-07 NOTE — TELEPHONE ENCOUNTER
Care Due:                  Date            Visit Type   Department     Provider  --------------------------------------------------------------------------------                                EP -                              PRIMARY      [facility] INTERNAL  Last Visit: 11-      CARE ([facility])   MEDICINE       Alon Teresa  Next Visit: None Scheduled  None         None Found                                                            Last  Test          Frequency    Reason                     Performed    Due Date  --------------------------------------------------------------------------------    HBA1C.......  6 months...  insulin..................  02-   08-    [facility] Embedded Care Due Messages. Reference number: [id number].   7/06/2024 10:20:02 PM CDT

## 2024-07-08 RX ORDER — ENALAPRIL MALEATE 20 MG/1
20 TABLET ORAL
Qty: 90 TABLET | Refills: 1 | Status: SHIPPED | OUTPATIENT
Start: 2024-07-08

## 2024-07-08 RX ORDER — METOPROLOL SUCCINATE 25 MG/1
25 TABLET, EXTENDED RELEASE ORAL DAILY
Qty: 90 TABLET | Refills: 4 | Status: SHIPPED | OUTPATIENT
Start: 2024-07-08

## 2024-07-08 NOTE — TELEPHONE ENCOUNTER
Provider Staff:  Action required for this patient     Please see care gap opportunities below in Care Due Message.    Thanks!  [facility]     Appointments      Date Provider   Last Visit   11/1/2023 Alon Teresa DO   Next Visit   Visit date not found Brii, Alon GREENE, DO      Refill Decision Note   Sarita Alvarez  is requesting a refill authorization.  Brief Assessment and Rationale for Refill:  Approve     Medication Therapy Plan:         Comments:     Note composed:3:01 AM 07/08/2024

## 2024-07-10 DIAGNOSIS — E78.5 HYPERLIPIDEMIA, UNSPECIFIED HYPERLIPIDEMIA TYPE: ICD-10-CM

## 2024-07-10 RX ORDER — SIMVASTATIN 20 MG/1
20 TABLET, FILM COATED ORAL
Qty: 90 TABLET | Refills: 1 | Status: SHIPPED | OUTPATIENT
Start: 2024-07-10

## 2024-07-10 NOTE — TELEPHONE ENCOUNTER
No care due was identified.  [facility] Embedded Care Due Messages. Reference number: [id number].   7/10/2024 6:03:30 AM CDT

## 2024-07-10 NOTE — TELEPHONE ENCOUNTER
Refill Decision Note   Sarita Alvarez  is requesting a refill authorization.  Brief Assessment and Rationale for Refill:  Approve     Medication Therapy Plan:        Comments:     Note composed:8:14 AM 07/10/2024

## 2024-07-14 DIAGNOSIS — E05.90 HYPERTHYROIDISM: ICD-10-CM

## 2024-07-15 ENCOUNTER — PATIENT MESSAGE (OUTPATIENT)
Dept: ADMINISTRATIVE | Facility: HOSPITAL | Age: 74
End: 2024-07-15
Payer: MEDICARE

## 2024-07-15 ENCOUNTER — PATIENT MESSAGE (OUTPATIENT)
Dept: ENDOCRINOLOGY | Facility: CLINIC | Age: 74
End: 2024-07-15
Payer: MEDICARE

## 2024-07-15 ENCOUNTER — PATIENT OUTREACH (OUTPATIENT)
Dept: ADMINISTRATIVE | Facility: HOSPITAL | Age: 74
End: 2024-07-15
Payer: MEDICARE

## 2024-07-15 DIAGNOSIS — E11.9 TYPE 2 DIABETES MELLITUS WITHOUT COMPLICATION, WITHOUT LONG-TERM CURRENT USE OF INSULIN: Primary | ICD-10-CM

## 2024-07-15 RX ORDER — METHIMAZOLE 5 MG/1
TABLET ORAL
Qty: 45 TABLET | Refills: 0 | Status: SHIPPED | OUTPATIENT
Start: 2024-07-15 | End: 2024-07-16 | Stop reason: SDUPTHER

## 2024-07-16 ENCOUNTER — LAB VISIT (OUTPATIENT)
Dept: LAB | Facility: OTHER | Age: 74
End: 2024-07-16
Attending: FAMILY MEDICINE
Payer: MEDICARE

## 2024-07-16 ENCOUNTER — TELEPHONE (OUTPATIENT)
Dept: INTERNAL MEDICINE | Facility: CLINIC | Age: 74
End: 2024-07-16
Payer: MEDICARE

## 2024-07-16 DIAGNOSIS — E05.90 HYPERTHYROIDISM: ICD-10-CM

## 2024-07-16 DIAGNOSIS — E11.9 TYPE 2 DIABETES MELLITUS WITHOUT COMPLICATION, WITHOUT LONG-TERM CURRENT USE OF INSULIN: ICD-10-CM

## 2024-07-16 LAB
ESTIMATED AVG GLUCOSE: 169 MG/DL (ref 68–131)
HBA1C MFR BLD: 7.5 % (ref 4–5.6)

## 2024-07-16 PROCEDURE — 83036 HEMOGLOBIN GLYCOSYLATED A1C: CPT | Performed by: FAMILY MEDICINE

## 2024-07-16 PROCEDURE — 36415 COLL VENOUS BLD VENIPUNCTURE: CPT | Performed by: FAMILY MEDICINE

## 2024-07-16 RX ORDER — METHIMAZOLE 5 MG/1
TABLET ORAL
Qty: 45 TABLET | Refills: 0 | Status: SHIPPED | OUTPATIENT
Start: 2024-07-16

## 2024-07-16 NOTE — TELEPHONE ENCOUNTER
No care due was identified.  [facility] Embedded Care Due Messages. Reference number: [id number].   7/16/2024 12:40:41 PM CDT

## 2024-07-16 NOTE — TELEPHONE ENCOUNTER
----- Message from Alon Teresa DO sent at 7/16/2024  3:28 PM CDT -----  Diabetes well controlled. Keep up the good work!

## 2024-07-16 NOTE — TELEPHONE ENCOUNTER
----- Message from Maximino Vivas sent at 7/16/2024 11:00 AM CDT -----  Contact: Maria C  Type:  Patient Call          Who Called: Patient  Patient         Does the patient know what this is regarding?: Requesting a call back about having a refill on Rx methIMAzole (TAPAZOLE) 5 MG Tab ; please advise           Would the patient rather a call back or a response via [facility]?call           Best Call Back Number: [phone]             Additional Information:   [facility] [place], [place] - [address]  [address] [facility] [address]  Phone: [phone] Fax: [phone]

## 2024-07-16 NOTE — TELEPHONE ENCOUNTER
Informed pt that her Diabetes is well controlled. Keep up the good work! A1c 7.5. Pt verbalized understanding.

## 2024-07-16 NOTE — TELEPHONE ENCOUNTER
Refill Routing Note   Medication(s) are not appropriate for processing by [facility] for the following reason(s):        Outside of protocol    [facility] action(s):  Route               Appointments  past 12m or future 3m with PCP    Date Provider   Last Visit   11/1/2023 Weeks, Alon GREENE, DO   Next Visit   8/30/2024 Weeks, Alon GREENE, DO   ED visits in past 90 days: 0        Note composed:1:02 PM 07/16/2024

## 2024-08-06 ENCOUNTER — TELEPHONE (OUTPATIENT)
Dept: OBSTETRICS AND GYNECOLOGY | Facility: CLINIC | Age: 74
End: 2024-08-06
Payer: MEDICARE

## 2024-08-07 ENCOUNTER — OFFICE VISIT (OUTPATIENT)
Dept: OBSTETRICS AND GYNECOLOGY | Facility: CLINIC | Age: 74
End: 2024-08-07
Payer: MEDICARE

## 2024-08-07 ENCOUNTER — TELEPHONE (OUTPATIENT)
Dept: RADIOLOGY | Facility: HOSPITAL | Age: 74
End: 2024-08-07
Payer: MEDICARE

## 2024-08-07 VITALS
BODY MASS INDEX: 19.08 KG/M2 | SYSTOLIC BLOOD PRESSURE: 130 MMHG | DIASTOLIC BLOOD PRESSURE: 64 MMHG | HEIGHT: 64 IN | WEIGHT: 111.75 LBS

## 2024-08-07 DIAGNOSIS — N64.4 BREAST PAIN, LEFT: Primary | ICD-10-CM

## 2024-08-07 PROCEDURE — 3061F NEG MICROALBUMINURIA REV: CPT | Mod: CPTII,S$GLB,, | Performed by: NURSE PRACTITIONER

## 2024-08-07 PROCEDURE — 3008F BODY MASS INDEX DOCD: CPT | Mod: CPTII,S$GLB,, | Performed by: NURSE PRACTITIONER

## 2024-08-07 PROCEDURE — 1126F AMNT PAIN NOTED NONE PRSNT: CPT | Mod: CPTII,S$GLB,, | Performed by: NURSE PRACTITIONER

## 2024-08-07 PROCEDURE — 4010F ACE/ARB THERAPY RXD/TAKEN: CPT | Mod: CPTII,S$GLB,, | Performed by: NURSE PRACTITIONER

## 2024-08-07 PROCEDURE — 99999 PR PBB SHADOW E&M-EST. PATIENT-LVL III: CPT | Mod: PBBFAC,,, | Performed by: NURSE PRACTITIONER

## 2024-08-07 PROCEDURE — 3072F LOW RISK FOR RETINOPATHY: CPT | Mod: CPTII,S$GLB,, | Performed by: NURSE PRACTITIONER

## 2024-08-07 PROCEDURE — 99213 OFFICE O/P EST LOW 20 MIN: CPT | Mod: S$GLB,,, | Performed by: NURSE PRACTITIONER

## 2024-08-07 PROCEDURE — 3288F FALL RISK ASSESSMENT DOCD: CPT | Mod: CPTII,S$GLB,, | Performed by: NURSE PRACTITIONER

## 2024-08-07 PROCEDURE — 3051F HG A1C>EQUAL 7.0%<8.0%: CPT | Mod: CPTII,S$GLB,, | Performed by: NURSE PRACTITIONER

## 2024-08-07 PROCEDURE — 3078F DIAST BP <80 MM HG: CPT | Mod: CPTII,S$GLB,, | Performed by: NURSE PRACTITIONER

## 2024-08-07 PROCEDURE — 3066F NEPHROPATHY DOC TX: CPT | Mod: CPTII,S$GLB,, | Performed by: NURSE PRACTITIONER

## 2024-08-07 PROCEDURE — 1159F MED LIST DOCD IN RCRD: CPT | Mod: CPTII,S$GLB,, | Performed by: NURSE PRACTITIONER

## 2024-08-07 PROCEDURE — 1101F PT FALLS ASSESS-DOCD LE1/YR: CPT | Mod: CPTII,S$GLB,, | Performed by: NURSE PRACTITIONER

## 2024-08-07 PROCEDURE — 3075F SYST BP GE 130 - 139MM HG: CPT | Mod: CPTII,S$GLB,, | Performed by: NURSE PRACTITIONER

## 2024-08-10 ENCOUNTER — HOSPITAL ENCOUNTER (EMERGENCY)
Facility: HOSPITAL | Age: 74
Discharge: HOME | End: 2024-08-10
Attending: EMERGENCY MEDICINE
Payer: MEDICARE

## 2024-08-10 VITALS
OXYGEN SATURATION: 100 % | BODY MASS INDEX: 18.78 KG/M2 | DIASTOLIC BLOOD PRESSURE: 62 MMHG | SYSTOLIC BLOOD PRESSURE: 130 MMHG | WEIGHT: 110 LBS | HEIGHT: 64 IN | RESPIRATION RATE: 16 BRPM | TEMPERATURE: 98 F | HEART RATE: 53 BPM

## 2024-08-10 DIAGNOSIS — R07.9 CHEST PAIN: ICD-10-CM

## 2024-08-10 DIAGNOSIS — R07.89 CHEST WALL PAIN: Primary | ICD-10-CM

## 2024-08-10 LAB
ALBUMIN SERPL BCP-MCNC: 3.7 G/DL (ref 3.5–5.2)
ALP SERPL-CCNC: 72 U/L (ref 55–135)
ALT SERPL W/O P-5'-P-CCNC: 13 U/L (ref 10–44)
ANION GAP SERPL CALC-SCNC: 8 MMOL/L (ref 8–16)
AST SERPL-CCNC: 16 U/L (ref 10–40)
BASOPHILS # BLD AUTO: 0.07 K/UL (ref 0–0.2)
BASOPHILS NFR BLD: 0.9 % (ref 0–1.9)
BILIRUB SERPL-MCNC: 0.2 MG/DL (ref 0.1–1)
BNP SERPL-MCNC: 123 PG/ML (ref 0–99)
BUN SERPL-MCNC: 17 MG/DL (ref 8–23)
CALCIUM SERPL-MCNC: 9.7 MG/DL (ref 8.7–10.5)
CHLORIDE SERPL-SCNC: 106 MMOL/L (ref 95–110)
CO2 SERPL-SCNC: 23 MMOL/L (ref 23–29)
CREAT SERPL-MCNC: 0.9 MG/DL (ref 0.5–1.4)
DIFFERENTIAL METHOD BLD: ABNORMAL
EOSINOPHIL # BLD AUTO: 0.2 K/UL (ref 0–0.5)
EOSINOPHIL NFR BLD: 2.8 % (ref 0–8)
ERYTHROCYTE [DISTWIDTH] IN BLOOD BY AUTOMATED COUNT: 12.5 % (ref 11.5–14.5)
EST. GFR  (NO RACE VARIABLE): >60 ML/MIN/1.73 M^2
GLUCOSE SERPL-MCNC: 228 MG/DL (ref 70–110)
HCT VFR BLD AUTO: 40.9 % (ref 37–48.5)
HGB BLD-MCNC: 13 G/DL (ref 12–16)
IMM GRANULOCYTES # BLD AUTO: 0.01 K/UL (ref 0–0.04)
IMM GRANULOCYTES NFR BLD AUTO: 0.1 % (ref 0–0.5)
LIPASE SERPL-CCNC: 18 U/L (ref 4–60)
LYMPHOCYTES # BLD AUTO: 1.2 K/UL (ref 1–4.8)
LYMPHOCYTES NFR BLD: 16.3 % (ref 18–48)
MCH RBC QN AUTO: 27 PG (ref 27–31)
MCHC RBC AUTO-ENTMCNC: 31.8 G/DL (ref 32–36)
MCV RBC AUTO: 85 FL (ref 82–98)
MONOCYTES # BLD AUTO: 0.5 K/UL (ref 0.3–1)
MONOCYTES NFR BLD: 6.8 % (ref 4–15)
NEUTROPHILS # BLD AUTO: 5.4 K/UL (ref 1.8–7.7)
NEUTROPHILS NFR BLD: 73.1 % (ref 38–73)
NRBC BLD-RTO: 0 /100 WBC
OHS QRS DURATION: 92 MS
OHS QTC CALCULATION: 452 MS
PLATELET # BLD AUTO: 212 K/UL (ref 150–450)
PMV BLD AUTO: 9.9 FL (ref 9.2–12.9)
POTASSIUM SERPL-SCNC: 3.7 MMOL/L (ref 3.5–5.1)
PROT SERPL-MCNC: 6.9 G/DL (ref 6–8.4)
RBC # BLD AUTO: 4.82 M/UL (ref 4–5.4)
SODIUM SERPL-SCNC: 137 MMOL/L (ref 136–145)
TROPONIN I SERPL DL<=0.01 NG/ML-MCNC: 0.01 NG/ML (ref 0–0.03)
TROPONIN I SERPL DL<=0.01 NG/ML-MCNC: <0.006 NG/ML (ref 0–0.03)
WBC # BLD AUTO: 7.38 K/UL (ref 3.9–12.7)

## 2024-08-10 PROCEDURE — 96374 THER/PROPH/DIAG INJ IV PUSH: CPT

## 2024-08-10 PROCEDURE — 84484 ASSAY OF TROPONIN QUANT: CPT | Performed by: EMERGENCY MEDICINE

## 2024-08-10 PROCEDURE — 93010 ELECTROCARDIOGRAM REPORT: CPT | Mod: ,,, | Performed by: INTERNAL MEDICINE

## 2024-08-10 PROCEDURE — 80053 COMPREHEN METABOLIC PANEL: CPT | Performed by: EMERGENCY MEDICINE

## 2024-08-10 PROCEDURE — 99285 EMERGENCY DEPT VISIT HI MDM: CPT | Mod: 25

## 2024-08-10 PROCEDURE — 83880 ASSAY OF NATRIURETIC PEPTIDE: CPT | Performed by: EMERGENCY MEDICINE

## 2024-08-10 PROCEDURE — 93005 ELECTROCARDIOGRAM TRACING: CPT

## 2024-08-10 PROCEDURE — 63600175 PHARM REV CODE 636 W HCPCS: Performed by: EMERGENCY MEDICINE

## 2024-08-10 PROCEDURE — 83690 ASSAY OF LIPASE: CPT | Performed by: EMERGENCY MEDICINE

## 2024-08-10 PROCEDURE — 85025 COMPLETE CBC W/AUTO DIFF WBC: CPT | Performed by: EMERGENCY MEDICINE

## 2024-08-10 RX ORDER — KETOROLAC TROMETHAMINE 30 MG/ML
10 INJECTION, SOLUTION INTRAMUSCULAR; INTRAVENOUS
Status: COMPLETED | OUTPATIENT
Start: 2024-08-10 | End: 2024-08-10

## 2024-08-10 RX ADMIN — KETOROLAC TROMETHAMINE 10 MG: 30 INJECTION, SOLUTION INTRAMUSCULAR; INTRAVENOUS at 05:08

## 2024-08-10 NOTE — PROVIDER PROGRESS NOTES - EMERGENCY DEPT.
Encounter Date: 8/10/2024    ED Physician Progress Notes          STAFF PHYSICIAN F/U NOTE:  Sarita Alvarez has been evaluated and treated by Dr. Luis. S/O pending Trop, which have remained within normal limits. She reports much improvement/complete resolution of Sx and is ready to return home. Currently patient reports no new Sx and is tolerating PO challenge. We discussed Sx warranting immediate ED return, which were acknowledged. I recommended F/U and discussion of ED visit with primary care physician.  ____________________  Jun Adamson MD, [facility]  Emergency Medicine Staff  9:07 AM 8/10/2024

## 2024-08-10 NOTE — ED PROVIDER NOTES
Source of History:  Patient  Chart    Chief complaint:  Chest Pain (Pt complaining of pain to the left side of her chest that radiates around her side to her back since last week. Pt states seeing her doctor on Wednesday and was told it was a pulled muscle. Pt states pain is reproducible upon movement. Pt denies any SOB, pt denies nausea )      HPI:  Sarita Alvarez is a 74 y.o. female with history of hypertension, diabetes, prior episode of SVT, presenting to emergency department with complaint of chest pain.    Patient states that she was had pain in the anterior and lateral chest that has been present for approximately 2 weeks.  Pain is worse with deep inspiration and with movement, initially started on the left side of the chest and now wraps around to the back.  Patient saw a doctor on Wednesday, was told it was a pulled muscle.  Pain has been worsening, patient not taking any medication for it because she did not feel it was necessary.  She denies shortness of breath.  There is no exertional component to the  pain.  No fevers, chills, cough.  No dizziness or lightheadedness.  No abdominal pain.    Review of patient's allergies indicates:   Allergen Reactions    Actonel [risedronate]      Other reaction(s): chest pain    Codeine Rash and Itching     Other reaction(s): Itching  Other reaction(s): Hives       No current facility-administered medications on file prior to encounter.     Current Outpatient Medications on File Prior to Encounter   Medication Sig Dispense Refill    amLODIPine (NORVASC) 5 MG tablet Take 1 tablet (5 mg total) by mouth once daily. 90 tablet 3    aspirin 81 mg Tab Take 81 mg by mouth. 1 Tablet Oral Every day      blood sugar diagnostic Strp 1 each by Misc.(Non-Drug; Combo Route) route 4 (four) times daily. 400 strip 3    blood-glucose meter kit Use as instructed 1 each 0    CALCIUM CARBONATE/VITAMIN D3 (CALCIUM+D ORAL) Take by mouth. 1 Capsule Oral Twice a day   Pt taking once daily    "   enalapril (VASOTEC) 20 MG tablet Take 1 tablet by mouth once daily 90 tablet 1    fluticasone (FLONASE) 50 mcg/actuation nasal spray USE TWO SPRAY(S) IN EACH NOSTRIL ONCE DAILY 16 g 1    glucagon, human recombinant, (GLUCAGON EMERGENCY KIT, HUMAN,) 1 mg SolR Inject 1 mg into the muscle as needed (for severe hypoglycemia). (Patient not taking: Reported on 12/3/2021) 1 each 1    hydroCHLOROthiazide (HYDRODIURIL) 25 MG tablet Take 1 tablet (25 mg total) by mouth once daily. 90 tablet 3    insulin glargine, TOUJEO, (TOUJEO SOLOSTAR U-300 INSULIN) 300 unit/mL (1.5 mL) InPn pen Inject 15 Units into the skin every evening. 4.5 mL 3    insulin lispro 100 unit/mL pen Inject TID AC based on 1:13 carb ratio plus correction scale, max TDD 20 units 15 mL 0    insulin needles, disposable, (BD ULTRA-FINE MARIA D PEN NEEDLES) 32 x 5/32 " Ndle 4 Sticks by Misc.(Non-Drug; Combo Route) route Daily. 400 each 1    lancets Misc 1 each by Misc.(Non-Drug; Combo Route) route 4 (four) times daily. 400 each 3    loratadine (CLARITIN) 10 mg tablet Take 10 mg by mouth daily as needed for Allergies.      methIMAzole (TAPAZOLE) 5 MG Tab Take 1/2 (one-half) tablet by mouth once daily 45 tablet 0    metoprolol succinate (TOPROL-XL) 25 MG 24 hr tablet Take 1 tablet (25 mg total) by mouth once daily. 90 tablet 4    pantoprazole (PROTONIX) 40 MG tablet Take 1 tablet (40 mg total) by mouth once daily. 90 tablet 1    simvastatin (ZOCOR) 20 MG tablet Take 1 tablet by mouth once daily 90 tablet 1    sucralfate (CARAFATE) 1 gram tablet Take 1 tablet (1 g total) by mouth 4 (four) times daily as needed (abdominal pain). 20 tablet 0    TRESIBA FLEXTOUCH U-100 100 unit/mL (3 mL) insulin pen INJECT 15 UNITS SUBCUTANEOUSLY ONCE DAILY         PMH:  As per HPI and below:  Past Medical History:   Diagnosis Date    Cataract     Diabetes mellitus type I     Diabetic peripheral neuropathy 07/09/2012    HTN (hypertension) 07/09/2012    Hyperlipidemia 07/09/2012    " Osteoporosis, post-menopausal 07/09/2012    Poorly controlled type 1 diabetes mellitus with peripheral neuropathy 07/09/2012    Supraventricular tachycardia 04/26/2014    AVNRT    SVT (supraventricular tachycardia) 07/09/2012    Thyroid disease     hyperthyroidism    Venous insufficiency (chronic) (peripheral) 07/09/2012     Past Surgical History:   Procedure Laterality Date    BREAST BIOPSY Left 12/23/2016    core, FA    COLONOSCOPY  11/28/2016    DILATION AND CURETTAGE OF UTERUS      RADIOFREQUENCY ABLATION  06/17/2014    AVNRT    WISDOM TOOTH EXTRACTION         Social History     Socioeconomic History    Marital status:    Tobacco Use    Smoking status: Never    Smokeless tobacco: Never   Substance and Sexual Activity    Alcohol use: No    Drug use: No    Sexual activity: Not Currently     Partners: Male   Social History Narrative    , 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[employer], 3 children     Social Determinants of Health     Financial Resource Strain: Low Risk  (6/14/2024)    Overall Financial Resource Strain (CARDIA)     Difficulty of Paying Living Expenses: Not very hard   Food Insecurity: No Food Insecurity (6/14/2024)    Hunger Vital Sign     Worried About Running Out of Food in the Last Year: Never true     Ran Out of Food in the Last Year: Never true   Transportation Needs: No Transportation Needs (6/14/2024)    PRAPARE - Transportation     Lack of Transportation (Medical): No     Lack of Transportation (Non-Medical): No   Physical Activity: Sufficiently Active (6/14/2024)    Exercise Vital Sign     Days of Exercise per Week: 7 days     Minutes of Exercise per Session: 60 min   Stress: No Stress Concern Present (6/14/2024)    [employer] [place] [employer] - Occupational Stress Questionnaire     Feeling of Stress : Not at all   Housing Stability: Low Risk  (6/14/2024)    Housing Stability Vital Sign     Unable to Pay for Housing in the Last Year: No     Homeless in the Last Year: No        Family History   Problem Relation Name Age of Onset    Heart disease Mother Quirino         valvular heart disease at 91    Hypertension Mother Quirino     Cataracts Mother Quirino     COPD Father Da     Glaucoma Father Da     Hypertension Sister Lexii     Glaucoma Sister Lexii     Allergies Sister Kathryn     No Known Problems Brother Da     No Known Problems Maternal Grandmother      No Known Problems Maternal Grandfather      No Known Problems Paternal Grandmother      No Known Problems Paternal Grandfather      Hypertension Daughter Neelam     Fibroids Daughter Neelam     Kidney nephrosis Daughter Neelam     Hypertension Daughter Darrilyn     No Known Problems Daughter Harsha     Diabetes Maternal Aunt      Cataracts Maternal Aunt      No Known Problems Maternal Uncle      No Known Problems Paternal Aunt      No Known Problems Paternal Uncle      Amblyopia Neg Hx      Blindness Neg Hx      Cancer Neg Hx      Macular degeneration Neg Hx      Retinal detachment Neg Hx      Strabismus Neg Hx      Stroke Neg Hx      Thyroid disease Neg Hx      Ovarian cancer Neg Hx      Colon cancer Neg Hx      Breast cancer Neg Hx         Physical Exam:      Vitals:    08/10/24 0855   BP: 130/62   Pulse: (!) 53   Resp: 16   Temp: 98.1 °F (36.7 °C)     Gen: No acute distress.  Nontoxic.  Well appearing.  Mental Status:  Alert and oriented .  Appropriate, conversant.  Skin: Warm, dry. No rashes seen.  Eyes: No conjunctival injection.  Pulm: CTAB. No increased work of breathing.  No significant tachypnea.  No audible stridor or wheezing.  No conversational dyspnea.    CV: Regular rate. Regular rhythm.   Abd: Soft.  Not distended.  Nontender.   MSK: Good range of motion all joints.  No deformities.  No reproducible chest wall tenderness.  No overlying skin change  Neuro: Awake. Speech normal. No focal neuro deficit observed.    Laboratory Studies:  Labs Reviewed   CBC W/ AUTO DIFFERENTIAL - Abnormal       Result Value    WBC  7.38      RBC 4.82      Hemoglobin 13.0      Hematocrit 40.9      MCV 85      MCH 27.0      MCHC 31.8 (*)     RDW 12.5      Platelets 212      MPV 9.9      Immature Granulocytes 0.1      Gran # (ANC) 5.4      Immature Grans (Abs) 0.01      Lymph # 1.2      Mono # 0.5      Eos # 0.2      Baso # 0.07      nRBC 0      Gran % 73.1 (*)     Lymph % 16.3 (*)     Mono % 6.8      Eosinophil % 2.8      Basophil % 0.9      Differential Method Automated     COMPREHENSIVE METABOLIC PANEL - Abnormal    Sodium 137      Potassium 3.7      Chloride 106      CO2 23      Glucose 228 (*)     BUN 17      Creatinine 0.9      Calcium 9.7      Total Protein 6.9      Albumin 3.7      Total Bilirubin 0.2      Alkaline Phosphatase 72      AST 16      ALT 13      eGFR >60.0      Anion Gap 8     B-TYPE NATRIURETIC PEPTIDE - Abnormal     (*)    TROPONIN I    Troponin I 0.009     TROPONIN I    Troponin I <0.006     LIPASE    Lipase 18         EKG (independently interpreted by me):  Normal sinus rhythm, rate 69.  No STEMI.  QRS 92 milliseconds.   milliseconds.  Ectopy present.    X-rays (independently interpreted by me):  No acute abnormality    Chart reviewed.     Imaging Results              X-Ray Chest AP Portable (Final result)  Result time 08/10/24 07:35:22      Final result by Rogers Baker MD (08/10/24 07:35:22)                   Impression:      No convincing evidence of acute cardiopulmonary disease.      Electronically signed by: Rogers Baker  Date:    08/10/2024  Time:    07:35               Narrative:    EXAMINATION:  XR CHEST AP PORTABLE    CLINICAL HISTORY:  Chest Pain;    TECHNIQUE:  Single frontal view of the chest was performed.    COMPARISON:  Chest radiograph performed 04/17/2023, 10:31 hours.    FINDINGS:  Cardiomediastinal contours appear grossly unchanged.  Mild hyperinflation of the lungs is suggested with mild chronic interstitial coarsening, as before.    No definite focal airspace consolidation.  No  evidence of pneumothorax or large volume pleural effusion.    No acute findings in the visualized abdomen.  Osseous and soft tissue structures appear without definite acute change.                                      Medications Given:  Medications   ketorolac injection 9.999 mg (9.999 mg Intravenous Given 8/10/24 0540)       MDM:    74 y.o. female with complaint of chest pain.  She is afebrile, hemodynamically stable.    Labs, EKG, chest x-ray obtained.  EKG without evidence of ischemia or arrhythmia.  Chest x-ray is unremarkable.  Initial labs including troponin and BNP reviewed.  No elevation of troponin.  BNP is 123.  Clinically there is no evidence of heart failure, chest x-ray is also not reflective of heart failure.      She received Toradol for her pain.  Based on her reassuring history and exam, low concern for emergent pathology such as pulmonary embolism, I did not pursue testing related to that diagnosis.      She was signed out to oncoming team pending repeat troponin for final disposition, likely discharge home.    Diagnostic Impression:    1. Chest wall pain    2. Chest pain         ED Disposition Condition    Discharge Stable          ED Prescriptions    None       Follow-up Information       Follow up With Specialties Details Why Contact Info    [facility] - Emergency Dept Emergency Medicine  If symptoms worsen [address]  [facility] [address] [phone]            Patient understands the plan and is in agreement, verbalized understanding, questions answered    Margi Luis MD  Emergency Medicine         Margi Luis MD  08/11/24 0113

## 2024-08-10 NOTE — DISCHARGE INSTRUCTIONS
Future Appointments   Date Time Provider Department Center   8/19/2024 10:15 AM [facility] GIGI MAMMO3 NOM MAMMO [address]   8/30/2024  9:45 AM Alon Teresa DO [facility]   10/17/2024  9:40 AM Louann Menendez OD [facility] OPTOMTY [address]     Imaging Results              X-Ray Chest AP Portable (Final result)  Result time 08/10/24 07:35:22      Final result by Rogers Baker MD (08/10/24 07:35:22)                   Impression:      No convincing evidence of acute cardiopulmonary disease.      Electronically signed by: Rogers Baker  Date:    08/10/2024  Time:    07:35               Narrative:    EXAMINATION:  XR CHEST AP PORTABLE    CLINICAL HISTORY:  Chest Pain;    TECHNIQUE:  Single frontal view of the chest was performed.    COMPARISON:  Chest radiograph performed 04/17/2023, 10:31 hours.    FINDINGS:  Cardiomediastinal contours appear grossly unchanged.  Mild hyperinflation of the lungs is suggested with mild chronic interstitial coarsening, as before.    No definite focal airspace consolidation.  No evidence of pneumothorax or large volume pleural effusion.    No acute findings in the visualized abdomen.  Osseous and soft tissue structures appear without definite acute change.

## 2024-08-10 NOTE — ED NOTES
Sarita Alvarez, a 74 y.o. female presents to the ED w/ complaint of     Triage note:  Chief Complaint   Patient presents with    Chest Pain     Pt complaining of pain to the left side of her chest that radiates around her side to her back since last week. Pt states seeing her doctor on Wednesday and was told it was a pulled muscle. Pt states pain is reproducible upon movement. Pt denies any SOB, pt denies nausea      Review of patient's allergies indicates:   Allergen Reactions    Actonel [risedronate]      Other reaction(s): chest pain    Codeine Rash and Itching     Other reaction(s): Itching  Other reaction(s): Hives     Past Medical History:   Diagnosis Date    Cataract     Diabetes mellitus type I     Diabetic peripheral neuropathy 07/09/2012    HTN (hypertension) 07/09/2012    Hyperlipidemia 07/09/2012    Osteoporosis, post-menopausal 07/09/2012    Poorly controlled type 1 diabetes mellitus with peripheral neuropathy 07/09/2012    Supraventricular tachycardia 04/26/2014    AVNRT    SVT (supraventricular tachycardia) 07/09/2012    Thyroid disease     hyperthyroidism    Venous insufficiency (chronic) (peripheral) 07/09/2012   Patient identifiers for Sarita Alvarez checked and correct.    LOC: The patient is awake, alert and aware of environment with an appropriate affect, the patient is oriented x 4 and speaking appropriately.  APPEARANCE: Patient resting comfortably and in no acute distress, patient is clean and well groomed, patient's clothing is properly fastened.  SKIN: The skin is warm and dry, color consistent with ethnicity, patient has normal skin turgor and moist mucus membranes, skin intact, no breakdown or bruising noted.  MUSCULOSKELETAL: Patient moving all extremities well, no obvious swelling or deformities noted.  RESPIRATORY: Airway is open and patent, respirations are spontaneous and even, patient has a normal effort and rate.  CARDIAC: Patient has a normal rate and rhythm, no  periphreal edema noted, capillary refill < 3 seconds. Normal +2 pedal pulses present. +L sided CP  ABDOMEN: Soft and non tender to palpation, no distention noted. Patient denies any nausea, vomiting, diarrhea, or constipation.   NEUROLOGIC: Eyes open spontaneously, PERRL, behavior appropriate to situation, follows commands, facial expression symmetrical, bilateral hand grasp equal and even, purposeful motor response noted, normal sensation in all extremities.     Allergies reported:   Review of patient's allergies indicates:   Allergen Reactions    Actonel [risedronate]      Other reaction(s): chest pain    Codeine Rash and Itching     Other reaction(s): Itching  Other reaction(s): Hives

## 2024-08-19 ENCOUNTER — PATIENT MESSAGE (OUTPATIENT)
Dept: OBSTETRICS AND GYNECOLOGY | Facility: CLINIC | Age: 74
End: 2024-08-19
Payer: MEDICARE

## 2024-08-19 ENCOUNTER — HOSPITAL ENCOUNTER (OUTPATIENT)
Dept: RADIOLOGY | Facility: HOSPITAL | Age: 74
Discharge: HOME | End: 2024-08-19
Attending: NURSE PRACTITIONER
Payer: MEDICARE

## 2024-08-19 DIAGNOSIS — N64.4 BREAST PAIN, LEFT: ICD-10-CM

## 2024-08-19 PROCEDURE — 77061 BREAST TOMOSYNTHESIS UNI: CPT | Mod: 26,LT,, | Performed by: RADIOLOGY

## 2024-08-19 PROCEDURE — 77061 BREAST TOMOSYNTHESIS UNI: CPT | Mod: TC,LT

## 2024-08-19 PROCEDURE — 77065 DX MAMMO INCL CAD UNI: CPT | Mod: 26,LT,, | Performed by: RADIOLOGY

## 2024-08-30 ENCOUNTER — OFFICE VISIT (OUTPATIENT)
Dept: INTERNAL MEDICINE | Facility: CLINIC | Age: 74
End: 2024-08-30
Payer: MEDICARE

## 2024-08-30 VITALS
OXYGEN SATURATION: 100 % | HEIGHT: 64 IN | HEART RATE: 59 BPM | DIASTOLIC BLOOD PRESSURE: 70 MMHG | SYSTOLIC BLOOD PRESSURE: 128 MMHG | WEIGHT: 115.31 LBS | BODY MASS INDEX: 19.68 KG/M2

## 2024-08-30 DIAGNOSIS — E13.9 LATENT AUTOIMMUNE DIABETES IN ADULTS (LADA), MANAGED AS TYPE 1: Primary | ICD-10-CM

## 2024-08-30 PROCEDURE — 99999 PR PBB SHADOW E&M-EST. PATIENT-LVL IV: CPT | Mod: PBBFAC,,, | Performed by: FAMILY MEDICINE

## 2024-08-30 PROCEDURE — 1159F MED LIST DOCD IN RCRD: CPT | Mod: CPTII,S$GLB,, | Performed by: FAMILY MEDICINE

## 2024-08-30 PROCEDURE — 99214 OFFICE O/P EST MOD 30 MIN: CPT | Mod: S$GLB,,, | Performed by: FAMILY MEDICINE

## 2024-08-30 PROCEDURE — 3051F HG A1C>EQUAL 7.0%<8.0%: CPT | Mod: CPTII,S$GLB,, | Performed by: FAMILY MEDICINE

## 2024-08-30 PROCEDURE — 3061F NEG MICROALBUMINURIA REV: CPT | Mod: CPTII,S$GLB,, | Performed by: FAMILY MEDICINE

## 2024-08-30 PROCEDURE — 3066F NEPHROPATHY DOC TX: CPT | Mod: CPTII,S$GLB,, | Performed by: FAMILY MEDICINE

## 2024-08-30 PROCEDURE — 3288F FALL RISK ASSESSMENT DOCD: CPT | Mod: CPTII,S$GLB,, | Performed by: FAMILY MEDICINE

## 2024-08-30 PROCEDURE — 3008F BODY MASS INDEX DOCD: CPT | Mod: CPTII,S$GLB,, | Performed by: FAMILY MEDICINE

## 2024-08-30 PROCEDURE — 3078F DIAST BP <80 MM HG: CPT | Mod: CPTII,S$GLB,, | Performed by: FAMILY MEDICINE

## 2024-08-30 PROCEDURE — 1101F PT FALLS ASSESS-DOCD LE1/YR: CPT | Mod: CPTII,S$GLB,, | Performed by: FAMILY MEDICINE

## 2024-08-30 PROCEDURE — 4010F ACE/ARB THERAPY RXD/TAKEN: CPT | Mod: CPTII,S$GLB,, | Performed by: FAMILY MEDICINE

## 2024-08-30 PROCEDURE — 3072F LOW RISK FOR RETINOPATHY: CPT | Mod: CPTII,S$GLB,, | Performed by: FAMILY MEDICINE

## 2024-08-30 PROCEDURE — 3074F SYST BP LT 130 MM HG: CPT | Mod: CPTII,S$GLB,, | Performed by: FAMILY MEDICINE

## 2024-08-30 PROCEDURE — 1126F AMNT PAIN NOTED NONE PRSNT: CPT | Mod: CPTII,S$GLB,, | Performed by: FAMILY MEDICINE

## 2024-08-30 RX ORDER — INSULIN DEGLUDEC 100 U/ML
15 INJECTION, SOLUTION SUBCUTANEOUS NIGHTLY
Qty: 13.5 ML | Refills: 3 | Status: SHIPPED | OUTPATIENT
Start: 2024-08-30 | End: 2025-08-25

## 2024-08-30 NOTE — PATIENT INSTRUCTIONS
RSV Vaccination for Older Adults 60 Years of Age and Over  Print  What types of RSV vaccines are there?  There are two RSV vaccines licensed by the U.S. Food and Drug Administration for use in adults 60 and older in the [place]:    RSVPreF3 (Arexvy)  RSVpreF (Abrysvo)  Both vaccines contain a part of the RSV virus. Both vaccines work by causing an immune response that can protect you from respiratory disease if you are infected with RSV in the future.    Who should talk to their healthcare provider about RSV vaccination?  Adults 60 years and older should talk with their health care provider about whether RSV vaccination is right for them. There is no maximum age for getting RSV vaccination. RSV vaccine is given as a single dose.    If youre 60 or older, your health care provider might recommend RSV vaccination for you, especially if you have a weakened immune system from illness (e.g., leukemia or HIV infection) or from medications (e.g., treatment for cancer or organ transplant), if you have chronic medical conditions such as heart or lung disease, or if you live in a nursing home. If any of those apply to you, you might be at higher risk of severe RSV disease and an RSV vaccine could help prevent serious illness.    Even if you had RSV infection in the past, RSV vaccination can help prevent future respiratory disease from RSV. There is no specific length of time that you need to wait after having RSV infection before you can receive an RSV vaccine, but generally, if you have a moderate or severe illness, you should wait until you recover before receiving an RSV vaccine. If you have a minor illness, such as a cold, you can get an RSV vaccine.    Who should not get RSV vaccination?  You should not get the RSV vaccine Arexvy if youve ever had a severe allergic reaction to any component of Arexvy. Information about Arexvy can be found in the s package insert.    You should not get the RSV  vaccine Abrysvo if youve ever had a severe allergic reaction to any component of Abrysvo. Information about Abrysvo can be found in the s package insert.    How well do these vaccines work?  One dose of RSV vaccine provides protection against RSV disease in adults ages 60 years and older for at least two winter seasons, when RSV normally circulates.    In adults ages 60 years and older with healthy immune systems, one dose of the RSV vaccine Arexvy was 83% effective in preventing lung infections (like pneumonia) due to RSV during the first RSV season after vaccination. During the second RSV season after vaccination, one dose of Arexvy was still 56% effective against lung infections.  In adults ages 60 years and older with healthy immune systems, one dose of the RSV vaccine Abrysvo was 89% effective in preventing lung infections (like pneumonia) due to RSV during the first RSV season after vaccination. Based on early results from the second RSV season in a large study of how well the vaccine works, Abrysvo continues to provide protection, but the second season is ongoing and final results have not yet been released.  What are the possible side effects?  Side effects such as pain, redness, and swelling where the shot is given, fatigue, fever, headache, nausea, diarrhea, and muscle or joint pain are possible after RSV vaccination. These side effects are usually mild. Patients who have experienced these symptoms when getting other vaccines might be more likely to experience them after RSV vaccination.    A small number of participants in clinical trials developed serious neurologic conditions, including Guillain-Barré syndrome (GBS), after RSV vaccination. GBS is a rare condition in which your immune system attacks your nerves, causing symptoms such as weakness. However, given the small number, it is unclear whether the vaccine caused these events, or whether they occurred due to chance.    If you  experience side effects from RSV vaccination, you should report them to the Vaccine Adverse Event Reporting System (VAERS). Your health care provider might file this report, or you can do it yourself through the VAERS website, or by calling [phone].    If you have any questions about side effects from RSV vaccination, talk with your health care provider.    When should I get an RSV vaccine?  For the 2023-24 RSV season, if you are 60 years or older and your health care provider recommends RSV vaccination for you, you should get an RSV vaccine as soon as it is available in your community, before the number of cases of RSV start to increase, which is usually in the fall and winter. Early vaccination will ensure you are protected by the time RSV begins to circulate in your community. During the 2021-22 and 2022-23 RSV seasons, the number of cases began increasing as early as July in parts of the [place], so it may be difficult to predict when it will start in 2023.    Do I need a prescription for an RSV vaccine?  See CDCs Where to find vaccines for information on prescriptions for vaccines.    How do I pay for RSV vaccination?  See CDCs How to Pay for Vaccines    References  Josue BRENNAN, Low A, Rosemarie SHERIDAN, et al. Use of Respiratory Syncytial Virus Vaccines in Older Adults: Recommendations of the Advisory Committee on Immunization Practices -- [place], 2023. MMWR Morb Mortal Wkly Rep 2023;72:793-801. DOI: http://dx.doi.org/10.42592/mmwr.ov5218z7

## 2024-09-08 DIAGNOSIS — I10 ESSENTIAL HYPERTENSION: ICD-10-CM

## 2024-09-09 RX ORDER — AMLODIPINE BESYLATE 5 MG/1
5 TABLET ORAL
Qty: 90 TABLET | Refills: 0 | Status: SHIPPED | OUTPATIENT
Start: 2024-09-09

## 2024-09-11 NOTE — PROGRESS NOTES
"Subjective:       Patient ID: Sarita Alvarez is a 74 y.o. female.    Chief Complaint: Annual Exam    HPI  History of Present Illness  The patient is a 74-year-old female who presents today for follow-up on diabetes.    She has been under the care of Dr. Sunshine, an endocrinologist, for her diabetes management. Her last consultation with him was in June 2023. She is currently on Tresiba, taking 15 units daily, and lispro, which she adjusts based on her carbohydrate intake. She is diligent about maintaining her blood sugar levels within the normal range, as she experiences hypoglycemia when physically active. She also reports feeling fatigued and is attempting to gain weight. She is due for a foot examination, which is typically conducted by her endocrinologist. Additionally, she has an upcoming eye examination scheduled for October 2024.    She recently saw an OBGYN for breast pain and visited the emergency room, where she underwent an EKG, chest x-ray, and blood tests. She also saw Dr. Palacio not too long ago. Her current medications include simvastatin and metoprolol.    Diabetes Management Status    Statin: Taking  ACE/ARB: Taking    Screening or Prevention Patient's value Goal Complete/Controlled?   HgA1C Testing and Control   Lab Results   Component Value Date    HGBA1C 7.5 (H) 07/16/2024      Annually/Less than 8% Yes   Lipid profile : 02/12/2024 Annually Yes   LDL control Lab Results   Component Value Date    LDLCALC 81.4 02/12/2024    Annually/Less than 100 mg/dl  Yes   Nephropathy screening Lab Results   Component Value Date    LABMICR 7.0 02/12/2024     Lab Results   Component Value Date    PROTEINUA Negative 04/17/2023     No results found for: "UTPCR"   Annually Yes   Blood pressure BP Readings from Last 1 Encounters:   08/30/24 128/70    Less than 140/90 Yes   Dilated retinal exam : 02/20/2023 Annually No   Foot exam   : 08/30/2024 Annually Yes       Tests to Keep You Healthy    Mammogram: Met on " 8/19/2024  Eye Exam: DUE  Colon Cancer Screening: Met on 12/2/2016  Last Blood Pressure <= 139/89 (8/30/2024): Yes  Last HbA1c < 8 (07/16/2024): Yes      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[employer], 3 children       Family History   Problem Relation Name Age of Onset    Heart disease Mother Quirino         valvular heart disease at 91    Hypertension Mother Quirino     Cataracts Mother Quirino     COPD Father [place]     Glaucoma Father Da     Hypertension Sister Lexii     Glaucoma Sister Lexii     Allergies Sister Kathryn     No Known Problems Brother Da     No Known Problems Maternal Grandmother      No Known Problems Maternal Grandfather      No Known Problems Paternal Grandmother      No Known Problems Paternal Grandfather      Hypertension Daughter Neelam     Fibroids Daughter Neelam     Kidney nephrosis Daughter Neelam     Hypertension Daughter Darrilyn     No Known Problems Daughter Harsha     Diabetes Maternal Aunt      Cataracts Maternal Aunt      No Known Problems Maternal Uncle      No Known Problems Paternal Aunt      No Known Problems Paternal Uncle      Amblyopia Neg Hx      Blindness Neg Hx      Cancer Neg Hx      Macular degeneration Neg Hx      Retinal detachment Neg Hx      Strabismus Neg Hx      Stroke Neg Hx      Thyroid disease Neg Hx      Ovarian cancer Neg Hx      Colon cancer Neg Hx      Breast cancer Neg Hx         Current Outpatient Medications:     aspirin 81 mg Tab, Take 81 mg by mouth. 1 Tablet Oral Every day, Disp: , Rfl:     blood sugar diagnostic Strp, 1 each by Misc.(Non-Drug; Combo Route) route 4 (four) times daily., Disp: 400 strip, Rfl: 3    blood-glucose meter kit, Use as instructed, Disp: 1 each, Rfl: 0    CALCIUM CARBONATE/VITAMIN D3 (CALCIUM+D ORAL), Take by mouth. 1 Capsule Oral Twice a day  Pt taking once daily, Disp: , Rfl:     enalapril (VASOTEC) 20 MG tablet, Take 1 tablet by mouth once daily, Disp: 90 tablet, Rfl: 1     "fluticasone (FLONASE) 50 mcg/actuation nasal spray, USE TWO SPRAY(S) IN EACH NOSTRIL ONCE DAILY, Disp: 16 g, Rfl: 1    hydroCHLOROthiazide (HYDRODIURIL) 25 MG tablet, Take 1 tablet (25 mg total) by mouth once daily., Disp: 90 tablet, Rfl: 3    insulin lispro 100 unit/mL pen, Inject TID AC based on 1:13 carb ratio plus correction scale, max TDD 20 units, Disp: 15 mL, Rfl: 0    insulin needles, disposable, (BD ULTRA-FINE MARIA D PEN NEEDLES) 32 x 5/32 " Ndle, 4 Sticks by [facility].(Non-Drug; Combo Route) route Daily., Disp: 400 each, Rfl: 1    lancets Misc, 1 each by Misc.(Non-Drug; Combo Route) route 4 (four) times daily., Disp: 400 each, Rfl: 3    loratadine (CLARITIN) 10 mg tablet, Take 10 mg by mouth daily as needed for Allergies., Disp: , Rfl:     methIMAzole (TAPAZOLE) 5 MG Tab, Take 1/2 (one-half) tablet by mouth once daily, Disp: 45 tablet, Rfl: 0    metoprolol succinate (TOPROL-XL) 25 MG 24 hr tablet, Take 1 tablet (25 mg total) by mouth once daily., Disp: 90 tablet, Rfl: 4    pantoprazole (PROTONIX) 40 MG tablet, Take 1 tablet (40 mg total) by mouth once daily., Disp: 90 tablet, Rfl: 1    simvastatin (ZOCOR) 20 MG tablet, Take 1 tablet by mouth once daily, Disp: 90 tablet, Rfl: 1    amLODIPine (NORVASC) 5 MG tablet, Take 1 tablet by mouth once daily, Disp: 90 tablet, Rfl: 0    glucagon, human recombinant, (GLUCAGON EMERGENCY KIT, HUMAN,) 1 mg SolR, Inject 1 mg into the muscle as needed (for severe hypoglycemia). (Patient not taking: Reported on 12/3/2021), Disp: 1 each, Rfl: 1    sucralfate (CARAFATE) 1 gram tablet, Take 1 tablet (1 g total) by mouth 4 (four) times daily as needed (abdominal pain). (Patient not taking: Reported on 8/30/2024), Disp: 20 tablet, Rfl: 0    TRESIBA FLEXTOUCH U-100 100 unit/mL (3 mL) insulin pen, Inject 15 Units into the skin every evening., Disp: 13.5 mL, Rfl: 3    Review of Systems   Constitutional:  Negative for chills and fever.   Respiratory:  Negative for cough and shortness of " "breath.    Cardiovascular:  Negative for chest pain.   Gastrointestinal:  Negative for abdominal pain.   Skin:  Negative for rash.   Neurological:  Negative for dizziness.       Objective:   /70 Comment: manual left arm  Pulse (!) 59   Ht 5' 4" (1.626 m)   Wt 52.3 kg (115 lb 4.8 oz)   SpO2 100%   BMI 19.79 kg/m²      Physical Exam  Vitals reviewed.   Constitutional:       Appearance: She is well-developed.   HENT:      Head: Normocephalic and atraumatic.   Eyes:      Conjunctiva/sclera: Conjunctivae normal.   Cardiovascular:      Rate and Rhythm: Normal rate.   Pulmonary:      Effort: Pulmonary effort is normal. No respiratory distress.   Skin:     General: Skin is warm and dry.      Findings: No rash.   Neurological:      Mental Status: She is alert and oriented to person, place, and time.      Coordination: Coordination normal.   Psychiatric:         Behavior: Behavior normal.         Physical Exam  Vital Signs  Blood pressure measures 120/70.    @resultssec@  Assessment & Plan   Assessment & Plan  1. Type 1 Diabetes Mellitus.  Her blood glucose levels have been well-managed recently. The most recent A1c reading was 7.5, which is within the acceptable range of 7 to 8. Her foot examination today showed no significant issues, with only a minor corn noted. A refill for Tresiba 15 units has been provided. She is also on lispro for carbohydrate counting. An appointment with Dr. Lentz, an endocrinologist, will be scheduled.    2. Hypertension.  Her blood pressure was slightly elevated at the beginning of the visit but normalized to 128/70 after a period of rest. She is currently on metoprolol, which was prescribed by her cardiologist. Blood pressure will be rechecked as needed.    3. Hyperlipidemia.  She is currently on simvastatin for cholesterol management and has refills available.    4. Health Maintenance.  She has an upcoming eye exam scheduled for October. She is advised to receive the influenza and RSV " vaccines at her local pharmacy.    Follow-up  The patient will follow up in 6 months.    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             Follow up in about 6 months (around 2/28/2025) for Hypertension, Diabetes.        Disclaimer:  This note may have been prepared using voice recognition software, it may have not been extensively proofed, as such there could be errors within the text such as sound alike errors.

## 2024-10-09 ENCOUNTER — TELEPHONE (OUTPATIENT)
Dept: INTERNAL MEDICINE | Facility: CLINIC | Age: 74
End: 2024-10-09
Payer: MEDICARE

## 2024-10-09 NOTE — TELEPHONE ENCOUNTER
----- Message from Julia Vick sent at 10/9/2024  9:12 AM CDT -----  Regarding: Medication  Refill  Request                  Reply in [facility]: NO      Please refill the medication(s) listed below. Please call the patient  f   [phone] (M)        Medication   metoprolol succinate (TOPROL-XL) 25 MG 24 hr tablet    Medication   enalapril (VASOTEC) 20 MG tablet    Medication   methIMAzole (TAPAZOLE) 5 MG Tab      Preferred Pharmacy: [facility] [address] [place] [address] Angelia Beltre   Phone: [phone]  Fax: [phone]

## 2024-10-17 ENCOUNTER — OFFICE VISIT (OUTPATIENT)
Dept: OPTOMETRY | Facility: CLINIC | Age: 74
End: 2024-10-17
Payer: MEDICARE

## 2024-10-17 DIAGNOSIS — H52.203 MYOPIA WITH ASTIGMATISM AND PRESBYOPIA, BILATERAL: ICD-10-CM

## 2024-10-17 DIAGNOSIS — H52.13 MYOPIA WITH ASTIGMATISM AND PRESBYOPIA, BILATERAL: ICD-10-CM

## 2024-10-17 DIAGNOSIS — H52.4 MYOPIA WITH ASTIGMATISM AND PRESBYOPIA, BILATERAL: ICD-10-CM

## 2024-10-17 DIAGNOSIS — E10.9 TYPE 1 DIABETES MELLITUS WITHOUT RETINOPATHY: Primary | ICD-10-CM

## 2024-10-17 DIAGNOSIS — E10.42 DIABETIC PERIPHERAL NEUROPATHY ASSOCIATED WITH TYPE 1 DIABETES MELLITUS: ICD-10-CM

## 2024-10-17 DIAGNOSIS — H25.813 COMBINED FORM OF SENILE CATARACT OF BOTH EYES: ICD-10-CM

## 2024-10-17 PROCEDURE — 99999 PR PBB SHADOW E&M-EST. PATIENT-LVL III: CPT | Mod: PBBFAC,,, | Performed by: OPTOMETRIST

## 2024-10-17 PROCEDURE — 3288F FALL RISK ASSESSMENT DOCD: CPT | Mod: CPTII,S$GLB,, | Performed by: OPTOMETRIST

## 2024-10-17 PROCEDURE — 2023F DILAT RTA XM W/O RTNOPTHY: CPT | Mod: CPTII,S$GLB,, | Performed by: OPTOMETRIST

## 2024-10-17 PROCEDURE — 3061F NEG MICROALBUMINURIA REV: CPT | Mod: CPTII,S$GLB,, | Performed by: OPTOMETRIST

## 2024-10-17 PROCEDURE — 3066F NEPHROPATHY DOC TX: CPT | Mod: CPTII,S$GLB,, | Performed by: OPTOMETRIST

## 2024-10-17 PROCEDURE — 4010F ACE/ARB THERAPY RXD/TAKEN: CPT | Mod: CPTII,S$GLB,, | Performed by: OPTOMETRIST

## 2024-10-17 PROCEDURE — 92014 COMPRE OPH EXAM EST PT 1/>: CPT | Mod: S$GLB,,, | Performed by: OPTOMETRIST

## 2024-10-17 PROCEDURE — 3051F HG A1C>EQUAL 7.0%<8.0%: CPT | Mod: CPTII,S$GLB,, | Performed by: OPTOMETRIST

## 2024-10-17 PROCEDURE — 1160F RVW MEDS BY RX/DR IN RCRD: CPT | Mod: CPTII,S$GLB,, | Performed by: OPTOMETRIST

## 2024-10-17 PROCEDURE — 1101F PT FALLS ASSESS-DOCD LE1/YR: CPT | Mod: CPTII,S$GLB,, | Performed by: OPTOMETRIST

## 2024-10-17 PROCEDURE — 1126F AMNT PAIN NOTED NONE PRSNT: CPT | Mod: CPTII,S$GLB,, | Performed by: OPTOMETRIST

## 2024-10-17 PROCEDURE — 1159F MED LIST DOCD IN RCRD: CPT | Mod: CPTII,S$GLB,, | Performed by: OPTOMETRIST

## 2024-10-17 NOTE — PROGRESS NOTES
HPI    SAMANTHA: 02/20/2023 with Dr. Rosas  Last DFE: 02/20/2023  Chief complaint (CC): Patient referred by Noe Reese NP for new   patient diabetic eye exam. Pt states that she sees well without glasses,   only uses glasses for night driving. Pt states that her eyes are painful   on rare occasions when her nose is stopped up/has sinus trouble.Pt states   occasional dark floaters in [place], denies any eye pain.  Glasses? Yes  Contacts? No  H/o eye surgery, injections or laser: None  H/o eye injury: None  Known eye conditions?   Type 1 diabetes mellitus without retinopathy   Nuclear sclerosis of both eyes   Family history of glaucoma in father   Myopia with astigmatism and presbyopia, bilateral  Family h/o eye conditions? (+)glaucoma - father  Eye gtts? None      (-) Flashes (+)  Floaters (+) Mucous   (+)  Tearing (+) Itching (+) Burning   (+) Headaches (+) Eye Pain/discomfort (+) Irritation   (+)  Redness (+) Double vision (-) Blurry vision    CL Exam: No    Diabetic? Yes  A1c? Lab Results       Component                Value               Date                       HGBA1C                   7.5 (H)             07/16/2024              Last edited by Louann Menendez, OD on 10/17/2024 12:46 PM.            Assessment /Plan     For exam results, see Encounter Report.        Type 1 diabetes mellitus without retinopathy  Diabetic peripheral neuropathy associated with type 1 diabetes mellitus  - Pt educated on the importance of maintaining adequate glycemic and blood pressure control via diet, compliance with medications, exercise and timely follow up with PCP.  No diabetic retinopathy, No CSME.  -  Return in 1 year for dilated eye exam.    Combined form of senile cataract of both eyes   - Not visually significant. Monitor.     Myopia with astigmatism and presbyopia, bilateral   - Rx stable; continue with same Rx.   - RTC 1 year for routine eye exam.

## 2024-11-26 ENCOUNTER — TELEPHONE (OUTPATIENT)
Dept: OBSTETRICS AND GYNECOLOGY | Facility: CLINIC | Age: 74
End: 2024-11-26
Payer: MEDICARE

## 2024-11-26 NOTE — TELEPHONE ENCOUNTER
----- Message from Shelby sent at 11/26/2024  8:38 AM CST -----  Type: Patient Call Back    Who called:pt     What is the request in detail:pt requesting to discuss if she needs the mammogram that she has an appt for today. She states she had one in August of this year. Please call pt     Can the clinic reply by [facility]?    Would the patient rather a call back or a response via [facility]? call    Best call back number:[phone] (home)       Additional Information:

## 2024-11-27 ENCOUNTER — TELEPHONE (OUTPATIENT)
Dept: OBSTETRICS AND GYNECOLOGY | Facility: CLINIC | Age: 74
End: 2024-11-27
Payer: MEDICARE

## 2024-11-27 NOTE — TELEPHONE ENCOUNTER
----- Message from Deisi sent at 11/27/2024  9:13 AM CST -----  Regarding: concerns  Name of Who is Calling: Sarita           What is the request in detail: Patient is requesting a call back to find out if she needed to do the mammogram that she canceled. She had one done in August do to pain in left side.            Can the clinic reply by [facility]: Yes           What Number to Call Back if not in [facility]: [phone]

## 2024-12-04 ENCOUNTER — HOSPITAL ENCOUNTER (OUTPATIENT)
Dept: RADIOLOGY | Facility: HOSPITAL | Age: 74
Discharge: HOME | End: 2024-12-04
Attending: OBSTETRICS & GYNECOLOGY
Payer: MEDICARE

## 2024-12-04 DIAGNOSIS — Z12.31 ENCOUNTER FOR SCREENING MAMMOGRAM FOR BREAST CANCER: ICD-10-CM

## 2024-12-04 PROCEDURE — 77067 SCR MAMMO BI INCL CAD: CPT | Mod: 26,,, | Performed by: RADIOLOGY

## 2024-12-04 PROCEDURE — 77067 SCR MAMMO BI INCL CAD: CPT | Mod: TC

## 2024-12-04 PROCEDURE — 77063 BREAST TOMOSYNTHESIS BI: CPT | Mod: 26,,, | Performed by: RADIOLOGY

## 2024-12-06 ENCOUNTER — PATIENT MESSAGE (OUTPATIENT)
Dept: OBSTETRICS AND GYNECOLOGY | Facility: CLINIC | Age: 74
End: 2024-12-06
Payer: MEDICARE

## 2024-12-12 DIAGNOSIS — I10 ESSENTIAL HYPERTENSION: ICD-10-CM

## 2024-12-12 RX ORDER — AMLODIPINE BESYLATE 5 MG/1
5 TABLET ORAL DAILY
Qty: 90 TABLET | Refills: 3 | Status: SHIPPED | OUTPATIENT
Start: 2024-12-12

## 2024-12-30 NOTE — TELEPHONE ENCOUNTER
Care Due:                  Date            Visit Type   Department     Provider  --------------------------------------------------------------------------------                                EP -                              PRIMARY      [facility] INTERNAL  Last Visit: 08-      CARE ([facility])   MEDICINE       Alon Teresa                              EP -                              PRIMARY      [facility] INTERNAL  Next Visit: 02-      CARE ([facility])   MEDICINE       Alon Teresa                                                            Last  Test          Frequency    Reason                     Performed    Due Date  --------------------------------------------------------------------------------    HBA1C.......  6 months...  TRESIBA..................  07- 01-    Lipid Panel.  12 months..  simvastatin..............  02- 02-    Health [facility] Embedded Care Due Messages. Reference number: [id number].   12/30/2024 9:30:40 AM CST

## 2024-12-30 NOTE — TELEPHONE ENCOUNTER
----- Message from Med Assistant Mcpherson sent at 12/30/2024  9:11 AM CST -----  Type: RX Refill Request    Who Called:  Pt   Have you contacted your pharmacy:    Refill    RX Name and Strength:  pantoprazole (PROTONIX) 40 MG tablet  Preferred Pharmacy with phone number:    [facility] [address] [facility] [address]  [address] [facility] [address]  Phone: [phone] Fax: [phone]     Local or Mail Order:  Local   Would the patient rather a call back or a response via [facility]?  Callback   Best Call Back Number:  Telephone Information:  Mobile          [phone]     Additional Information:    Thank you.

## 2024-12-31 RX ORDER — PANTOPRAZOLE SODIUM 40 MG/1
40 TABLET, DELAYED RELEASE ORAL DAILY
Qty: 90 TABLET | Refills: 1 | Status: SHIPPED | OUTPATIENT
Start: 2024-12-31

## 2024-12-31 NOTE — TELEPHONE ENCOUNTER
Refill Routing Note   Medication(s) are not appropriate for processing by [facility] for the following reason(s):        Due for refill >6 months ago  No active prescription written by provider    [facility] action(s):  Defer     Requires labs : Yes             Appointments  past 12m or future 3m with PCP    Date Provider   Last Visit   8/30/2024 Alon Teresa, DO   Next Visit   2/28/2025 Weeks, Alon GREENE, DO   ED visits in past 90 days: 0        Note composed:10:50 PM 12/30/2024

## 2025-01-01 DIAGNOSIS — I10 ESSENTIAL HYPERTENSION: ICD-10-CM

## 2025-01-02 RX ORDER — HYDROCHLOROTHIAZIDE 25 MG/1
25 TABLET ORAL
Qty: 90 TABLET | Refills: 2 | Status: SHIPPED | OUTPATIENT
Start: 2025-01-02

## 2025-01-02 NOTE — TELEPHONE ENCOUNTER
----- Message from César sent at 1/2/2025  8:28 AM CST -----  Regarding: self  Type: RX Refill Request    Who called: self    Refill or New Rx: refill  RX name and strength:hydroCHLOROthiazide (HYDRODIURIL) 25 MG tablet    Preferred pharmacy and phone number:   [facility] [place], [place] - [address]  [address] [facility] [address]  Phone: [phone] Fax: [phone]      Ordering provider: RALPH Teresa    Best call back number: [phone]    Additional information:

## 2025-01-02 NOTE — TELEPHONE ENCOUNTER
----- Message from César sent at 1/2/2025  8:28 AM CST -----  Regarding: self  Type: RX Refill Request    Who called: self    Refill or New Rx: refill  RX name and strength:hydroCHLOROthiazide (HYDRODIURIL) 25 MG tablet    Preferred pharmacy and phone number:   [facility] [place], [place] - [address]  [address] [facility] [address]  Phone: [phone] Fax: [phone]      Ordering provider: RALPH Teresa    Best call back number: [phone]    Additional information:

## 2025-01-02 NOTE — TELEPHONE ENCOUNTER
Refill Encounter    PCP Visits: Recent Visits  Date Type Provider Dept   08/30/24 Office Visit Weeks, Alon GREENE DO [facility] Internal [facility]   Showing recent visits within past 360 days and meeting all other requirements  Future Appointments  Date Type Provider Dept   02/28/25 Appointment Weeks, Alon GREENE DO [facility]   Showing future appointments within next 720 days and meeting all other requirements     Last 3 Blood Pressure:   BP Readings from Last 3 Encounters:   08/30/24 128/70   08/10/24 130/62   08/07/24 130/64     Preferred Pharmacy:   [facility] [address] [place] [address] [address]  [address] [facility] [address]  Phone: [phone] Fax: [phone]    Requested RX:  Requested Prescriptions     Pending Prescriptions Disp Refills    hydroCHLOROthiazide (HYDRODIURIL) 25 MG tablet [Pharmacy Med Name: hydroCHLOROthiazide 25 MG Oral Tablet] 90 tablet 0     Sig: Take 1 tablet by mouth once daily      RX Route: Normal

## 2025-01-02 NOTE — TELEPHONE ENCOUNTER
No care due was identified.  [facility] Embedded Care Due Messages. Reference number: [id number].   1/01/2025 10:53:05 PM CST

## 2025-01-03 NOTE — TELEPHONE ENCOUNTER
Refill Decision Note   Sarita Alvarez  is requesting a refill authorization.  Brief Assessment and Rationale for Refill:  Approve     Medication Therapy Plan:        Comments:     Note composed:6:57 PM 01/02/2025

## 2025-01-06 DIAGNOSIS — I10 HYPERTENSION, UNSPECIFIED TYPE: ICD-10-CM

## 2025-01-07 RX ORDER — ENALAPRIL MALEATE 20 MG/1
20 TABLET ORAL
Qty: 90 TABLET | Refills: 2 | Status: SHIPPED | OUTPATIENT
Start: 2025-01-07

## 2025-01-07 NOTE — TELEPHONE ENCOUNTER
No care due was identified.  [facility] Embedded Care Due Messages. Reference number: [id number].   1/06/2025 10:24:17 PM CST

## 2025-01-08 ENCOUNTER — TELEPHONE (OUTPATIENT)
Dept: INTERNAL MEDICINE | Facility: CLINIC | Age: 75
End: 2025-01-08
Payer: MEDICARE

## 2025-01-08 NOTE — TELEPHONE ENCOUNTER
Called and spoke to pharmacy [facility] [place].Pharmacist stated the medication enalapril (VASOTEC) 20 mg was received and ready for pickup.    Called patient and informed her that medication was ready. Patient verbalized gratitude understanding.

## 2025-01-08 NOTE — TELEPHONE ENCOUNTER
----- Message from Summer sent at 1/8/2025  9:37 AM CST -----  Regarding: enalapril (VASOTEC) 20 MG tablet  Type:  RX Refill Request     Who Called: pt  Refill or New Rx:refill  RX Name and Strength:enalapril (VASOTEC) 20 MG tablet  How is the patient currently taking it? (ex. 1XDay):1  Is this a 30 day or 90 day RX:90  Preferred Pharmacy with phone number:  Local or Mail Order:[place] [address] [place]   Phone: [phone]  Fax: [phone]        Ordering Provider:  Would the patient rather a call back or a response via [facility]? Call back   Best Call Back Number:[phone]  Additional Information:

## 2025-01-08 NOTE — TELEPHONE ENCOUNTER
Refill Decision Note   Sarita Alvarez  is requesting a refill authorization.  Brief Assessment and Rationale for Refill:  Approve     Medication Therapy Plan:         Comments:     Note composed:6:06 PM 01/07/2025

## 2025-01-13 DIAGNOSIS — E05.90 HYPERTHYROIDISM: ICD-10-CM

## 2025-01-13 NOTE — TELEPHONE ENCOUNTER
No care due was identified.  Health [facility] Embedded Care Due Messages. Reference number: [id number].   1/13/2025 4:30:55 PM CST

## 2025-01-13 NOTE — TELEPHONE ENCOUNTER
Refill Encounter  Allergies: Confirmed    PCP Visits: Recent Visits  Date Type Provider Dept   08/30/24 Office Visit Weeks, Alon GREENE DO [facility] Internal Medicine   Showing recent visits within past 360 days and meeting all other requirements  Future Appointments  Date Type Provider Dept   02/26/25 Appointment Weeks, Alon GREENE DO [facility] Internal Medicine   Showing future appointments within next 720 days and meeting all other requirements     Last 3 Blood Pressure:   BP Readings from Last 3 Encounters:   08/30/24 128/70   08/10/24 130/62   08/07/24 130/64     Preferred Pharmacy:   [facility] [address] [place] [address] - [address]  [address] [facility] [address]  Phone: [phone] Fax: [phone]    Requested RX:  Requested Prescriptions     Pending Prescriptions Disp Refills    methIMAzole (TAPAZOLE) 5 MG Tab [Pharmacy Med Name: methIMAzole 5 MG Oral Tablet] 45 tablet 0     Sig: Take 1/2 (one-half) tablet by mouth once daily      RX Route: Normal

## 2025-01-14 DIAGNOSIS — E05.90 HYPERTHYROIDISM: ICD-10-CM

## 2025-01-14 RX ORDER — METHIMAZOLE 5 MG/1
TABLET ORAL
Qty: 45 TABLET | Refills: 0 | OUTPATIENT
Start: 2025-01-14

## 2025-01-14 RX ORDER — METHIMAZOLE 5 MG/1
TABLET ORAL
Qty: 45 TABLET | Refills: 0 | Status: SHIPPED | OUTPATIENT
Start: 2025-01-14

## 2025-01-14 NOTE — TELEPHONE ENCOUNTER
No care due was identified.  Health [facility] Embedded Care Due Messages. Reference number: [id number].   1/14/2025 9:18:09 AM CST

## 2025-01-14 NOTE — TELEPHONE ENCOUNTER
Medication Pending       Refill Encounter    PCP Visits: Recent Visits  Date Type Provider Dept   08/30/24 Office Visit Weeks, Alon GREENE DO [facility] Internal Medicine   Showing recent visits within past 360 days and meeting all other requirements  Future Appointments  Date Type Provider Dept   02/26/25 Appointment Weeks, Alon GREENE DO [facility] Internal Medicine   Showing future appointments within next 720 days and meeting all other requirements     Last 3 Blood Pressure:   BP Readings from Last 3 Encounters:   08/30/24 128/70   08/10/24 130/62   08/07/24 130/64     Preferred Pharmacy:   [facility] [address] [place] [address] - [address]  [address] [facility] [address]  Phone: [phone] Fax: [phone]    Requested RX:  Requested Prescriptions      No prescriptions requested or ordered in this encounter      RX Route: Normal

## 2025-01-14 NOTE — TELEPHONE ENCOUNTER
----- Message from Ana Luisa sent at 1/14/2025  9:03 AM CST -----  Type: RX Refill Request    Who called: Patient    Refill or New Rx: Refill    Rx name and Strength: methIMAzole (TAPAZOLE) 5 MG Tab    Patient is out of medication; please advise    Would the patient rather a call back or a response via [facility]? Call    Best call back number:[phone]    Additional information:  [facility] [place], [place] - [address]  [address] [facility] [address]  Phone: [phone] Fax: [phone]

## 2025-01-29 DIAGNOSIS — E11.9 TYPE 2 DIABETES MELLITUS WITHOUT COMPLICATION: ICD-10-CM

## 2025-02-19 DIAGNOSIS — E11.9 TYPE 2 DIABETES MELLITUS WITHOUT COMPLICATION: ICD-10-CM

## 2025-02-24 ENCOUNTER — TELEPHONE (OUTPATIENT)
Dept: ENDOCRINOLOGY | Facility: CLINIC | Age: 75
End: 2025-02-24
Payer: MEDICARE

## 2025-02-24 NOTE — TELEPHONE ENCOUNTER
Spoke with patient informed patient to call back March 3rd to schedule visit in July. Patient verbalized understanding.      ----- Message from Med Assistant Lipscomb sent at 2/24/2025 11:33 AM CST -----  Regarding: FW: Patient request reschedule  Contact: pt [phone]    ----- Message -----  From: Dedra Watkins  Sent: 2/24/2025   8:58 AM CST  To: Lazara Evans MA; Zee Cardenas Staff  Subject: Patient request reschedule                       Type:  Needs Medical AdviceWho Called: Sarita called in regards to cancelling appt original Wed 2/26 7:30 pt says she refer an office visit appt times between the hours of 8:30am-10:30am pt does not want a virtual appt Would the patient rather a call back or a response [facility]? [facility]: pt [phone] Additional Information: pt says no one reached out to her asking her was a virtual visit what she wanted

## 2025-02-26 ENCOUNTER — PATIENT MESSAGE (OUTPATIENT)
Dept: ENDOCRINOLOGY | Facility: CLINIC | Age: 75
End: 2025-02-26
Payer: MEDICARE

## 2025-02-26 ENCOUNTER — OFFICE VISIT (OUTPATIENT)
Dept: INTERNAL MEDICINE | Facility: CLINIC | Age: 75
End: 2025-02-26
Payer: MEDICARE

## 2025-02-26 VITALS
HEART RATE: 61 BPM | DIASTOLIC BLOOD PRESSURE: 64 MMHG | TEMPERATURE: 98 F | SYSTOLIC BLOOD PRESSURE: 122 MMHG | OXYGEN SATURATION: 99 % | BODY MASS INDEX: 19.35 KG/M2 | WEIGHT: 113.31 LBS | RESPIRATION RATE: 18 BRPM | HEIGHT: 64 IN

## 2025-02-26 DIAGNOSIS — E10.42 DIABETIC PERIPHERAL NEUROPATHY ASSOCIATED WITH TYPE 1 DIABETES MELLITUS: ICD-10-CM

## 2025-02-26 DIAGNOSIS — E10.9 TYPE 1 DIABETES MELLITUS NOT AT GOAL: Primary | ICD-10-CM

## 2025-02-26 DIAGNOSIS — E13.9 LATENT AUTOIMMUNE DIABETES IN ADULTS (LADA), MANAGED AS TYPE 1: ICD-10-CM

## 2025-02-26 DIAGNOSIS — E05.90 HYPERTHYROIDISM: ICD-10-CM

## 2025-02-26 PROCEDURE — 99999 PR PBB SHADOW E&M-EST. PATIENT-LVL IV: CPT | Mod: PBBFAC,,, | Performed by: FAMILY MEDICINE

## 2025-02-26 NOTE — PROGRESS NOTES
Subjective:       Patient ID: Sarita Alvarez is a 75 y.o. female.    Chief Complaint: Follow-up, Sinus Problem, and Nasal Congestion (Mucus, flowing into chest as well)    Follow-up    Sinus Problem      History of Present Illness    CHIEF COMPLAINT:  Sarita presents today for a routine follow-up visit    CURRENT SYMPTOMS:  She presents with sinus congestion and pressure with clear nasal discharge that has recently developed color. She has been self-treating with Claritin for drainage and Tylenol for pressure and mucus expulsion, noting some improvement with both medications.    CARDIOVASCULAR:  Her blood pressure readings are typically in the 130s, with her most recent reading being 140. At a recent Heart Walk event, her blood pressure was 137/53 after participating in a Nature Walk.    DIABETES:  She continues to manage her diabetes. Blood sugar is 92.    GI HISTORY:  She has a history of hiatal hernia discovered during a previous upper GI series and continues to experience acid reflux. Last colonoscopy was in 2016.    IMMUNIZATIONS:  She has not received this year's flu vaccine. She did not receive a COVID booster last year and does not plan to get one this year. She has not received the RSV vaccine.      ROS:  General: -fever, -chills, -fatigue, -weight gain, -weight loss  Eyes: -vision changes, -redness, -discharge  ENT: -ear pain, +nasal congestion, -sore throat  Cardiovascular: -chest pain, -palpitations, -lower extremity edema  Respiratory: -cough, -shortness of breath  Gastrointestinal: -abdominal pain, -nausea, -vomiting, -diarrhea, -constipation, -blood in stool, +heartburn  Genitourinary: -dysuria, -hematuria, -frequency  Musculoskeletal: -joint pain, -muscle pain  Skin: -rash, -lesion  Neurological: -headache, -dizziness, -numbness, -tingling  Psychiatric: -anxiety, -depression, -sleep difficulty         Diabetes Management Status    Statin: Taking  ACE/ARB: Taking    Screening or Prevention  "Patient's value Goal Complete/Controlled?   HgA1C Testing and Control   Lab Results   Component Value Date    HGBA1C 7.5 (H) 07/16/2024      Annually/Less than 8% Yes   Lipid profile : 02/12/2024 Annually No   LDL control Lab Results   Component Value Date    LDLCALC 81.4 02/12/2024    Annually/Less than 100 mg/dl  No   Nephropathy screening Lab Results   Component Value Date    LABMICR 7.0 02/12/2024     Lab Results   Component Value Date    PROTEINUA Negative 04/17/2023     No results found for: "UTPCR"   Annually No   Blood pressure BP Readings from Last 1 Encounters:   02/26/25 122/64    Less than 140/90 Yes   Dilated retinal exam : 10/17/2024 Annually Yes   Foot exam   : 08/30/2024 Annually Yes       All of your core healthy metrics are met.      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[employer], 3 children       Family History   Problem Relation Name Age of Onset    Heart disease Mother Quirino         valvular heart disease at 91    Hypertension Mother Quirino     Cataracts Mother Quirino     COPD Father Da     Glaucoma Father Da     Hypertension Sister Lexii     Glaucoma Sister Lexii     Allergies Sister Kathryn     No Known Problems Brother Da     No Known Problems Maternal Grandmother      No Known Problems Maternal Grandfather      No Known Problems Paternal Grandmother      No Known Problems Paternal Grandfather      Hypertension Daughter Neelam     Fibroids Daughter Neelam     Kidney nephrosis Daughter Neelam     Hypertension Daughter Darrilyn     No Known Problems Daughter Harsha     Diabetes Maternal Aunt      Cataracts Maternal Aunt      No Known Problems Maternal Uncle      No Known Problems Paternal Aunt      No Known Problems Paternal Uncle      Amblyopia Neg Hx      Blindness Neg Hx      Cancer Neg Hx      Macular degeneration Neg Hx      Retinal detachment Neg Hx      Strabismus Neg Hx      Stroke Neg Hx      Thyroid disease Neg Hx      Ovarian " "cancer Neg Hx      Colon cancer Neg Hx      Breast cancer Neg Hx       Current Medications[1]    Review of Systems    Objective:   /64 (BP Location: Left arm, Patient Position: Sitting)   Pulse 61   Temp 97.9 °F (36.6 °C) (Oral)   Resp 18   Ht 5' 4" (1.626 m)   Wt 51.4 kg (113 lb 5.1 oz)   SpO2 99%   BMI 19.45 kg/m²      Physical Exam  Vitals reviewed.   Constitutional:       Appearance: She is well-developed.   HENT:      Head: Normocephalic and atraumatic.   Eyes:      Conjunctiva/sclera: Conjunctivae normal.   Cardiovascular:      Rate and Rhythm: Normal rate.   Pulmonary:      Effort: Pulmonary effort is normal. No respiratory distress.   Skin:     General: Skin is warm and dry.      Findings: No rash.   Neurological:      Mental Status: She is alert and oriented to person, place, and time.      Coordination: Coordination normal.   Psychiatric:         Behavior: Behavior normal.         Physical Exam              @resultssec@  Assessment & Plan   Assessment & Plan    IMPRESSION:  - Assessed patient's sinus symptoms and current medications    PLAN SUMMARY:   Order lab work including diabetes monitoring, thyroid and kidney function tests, and annual urine test   she will get flu shot in pharmacy another day   Order RSV vaccine, can be administered with flu vaccine if desired   Continue current medications for sinus symptoms and discomfort   Send message to Dr. Koch's staff requesting an in-person appointment   Restart Dexcom for better diabetes management but she will discuss with radha   Follow up in December 2026 for next colonoscopy   Repeat upper GI series only if new upper GI symptoms develop    DIABETIC PERIPHERAL NEUROPATHY ASSOCIATED WITH TYPE 1 DIABETES MELLITUS:   Monitored patient's glucose levels, currently at 92.   Addressed concern about blood sugar dropping and the need to eat to maintain levels.   Evaluated issues with previous Dexcom continuous glucose monitor showing inaccurate low " readings.   Recommend restarting Dexcom for better diabetes management and instructed patient to contact Oxana Arias, diabetes educator, to discuss this.   Ordered lab work to monitor diabetes, including thyroid and kidney function tests.    SINUS INFECTION:   Evaluated patient's symptoms including pressure in the head, nasal congestion, and colored nasal discharge.   Noted that Claritin and Tylenol provided some relief.   Recommend continuing both medications as needed for sinus symptoms and related discomfort.    FLU VACCINATION:   Discussed flu vaccine eligibility despite current sinus problems.   Recommend getting a flu vaccine, which can be administered together with RSV vaccine if desired.   Ordered flu vaccine to be administered in office after labs if patient feels comfortable.    RSV VACCINATION:   Recommend RSV vaccine in addition to flu vaccine.   Ordered RSV vaccine, which can be administered together with flu vaccine if desired.    DIABETES:   Discussed timing of meals in relation to labs.   Suggested reconnecting with a diabetes educator.    HYPERTENSION:   Monitored recent blood pressure readings: 122/unknown in office, 137/53 at a Heart Walk event.   Noted improvement from previous readings in the 130s-140s.    HIATAL HERNIA:   Noted previous diagnosis from an upper GI series.   Advised that a repeat upper GI series would only be necessary if new upper GI symptoms develop.    ACID REFLUX:   Monitored ongoing symptoms.   Mentioned availability of Dr. Washington for further evaluation.   Suggested possibility of combining a colonoscopy with an upper GI evaluation in the future.    LABS:   Ordered comprehensive lab work, including annual urine test.    Endo appt REFERRAL:   Sent message to Dr. Koch's staff requesting an in-person appointment for the patient.    FOLLOW UP:   Follow up in December 2026 for next colonoscopy.         Problem List Items Addressed This Visit          Neuro    Diabetic  peripheral neuropathy associated with type 1 diabetes mellitus       Endocrine    Hyperthyroidism    Relevant Orders    TSH     Other Visit Diagnoses         Type 1 diabetes mellitus not at goal    -  Primary    Relevant Orders    Comprehensive Metabolic Panel              Immunizations Administered on Date of Encounter - 2/26/2025       No immunizations on file.             No follow-ups on file.    This note was generated with the assistance of ambient listening technology. Verbal consent was obtained by the patient and accompanying visitor(s) for the recording of patient appointment to facilitate this note. I attest to having reviewed and edited the generated note for accuracy, though some syntax or spelling errors may persist. Please contact the author of this note for any clarification.      Disclaimer:  This note may have been prepared using voice recognition software, it may have not been extensively proofed, as such there could be errors within the text such as sound alike errors.         [1]   Current Outpatient Medications:     amLODIPine (NORVASC) 5 MG tablet, Take 1 tablet (5 mg total) by mouth once daily., Disp: 90 tablet, Rfl: 3    aspirin 81 mg Tab, Take 81 mg by mouth. 1 Tablet Oral Every day, Disp: , Rfl:     blood sugar diagnostic Strp, 1 each by Misc.(Non-Drug; Combo Route) route 4 (four) times daily., Disp: 400 strip, Rfl: 3    blood-glucose meter kit, Use as instructed, Disp: 1 each, Rfl: 0    CALCIUM CARBONATE/VITAMIN D3 (CALCIUM+D ORAL), Take by mouth. 1 Capsule Oral Twice a day  Pt taking once daily, Disp: , Rfl:     enalapril (VASOTEC) 20 MG tablet, Take 1 tablet by mouth once daily, Disp: 90 tablet, Rfl: 2    fluticasone (FLONASE) 50 mcg/actuation nasal spray, USE TWO SPRAY(S) IN EACH NOSTRIL ONCE DAILY, Disp: 16 g, Rfl: 1    hydroCHLOROthiazide (HYDRODIURIL) 25 MG tablet, Take 1 tablet by mouth once daily, Disp: 90 tablet, Rfl: 2    insulin lispro 100 unit/mL pen, Inject TID AC based on  "1:13 carb ratio plus correction scale, max TDD 20 units, Disp: 15 mL, Rfl: 0    insulin needles, disposable, (BD ULTRA-FINE MARIA D PEN NEEDLES) 32 x 5/32 " Ndle, 4 Sticks by Misc.(Non-Drug; Combo Route) route Daily., Disp: 400 each, Rfl: 1    lancets Misc, 1 each by Misc.(Non-Drug; Combo Route) route 4 (four) times daily., Disp: 400 each, Rfl: 3    loratadine (CLARITIN) 10 mg tablet, Take 10 mg by mouth daily as needed for Allergies., Disp: , Rfl:     methIMAzole (TAPAZOLE) 5 MG Tab, Take 1/2 (one-half) tablet by mouth once daily, Disp: 45 tablet, Rfl: 0    metoprolol succinate (TOPROL-XL) 25 MG 24 hr tablet, Take 1 tablet (25 mg total) by mouth once daily., Disp: 90 tablet, Rfl: 4    pantoprazole (PROTONIX) 40 MG tablet, Take 1 tablet (40 mg total) by mouth once daily., Disp: 90 tablet, Rfl: 1    simvastatin (ZOCOR) 20 MG tablet, Take 1 tablet by mouth once daily, Disp: 90 tablet, Rfl: 0    TRESIBA FLEXTOUCH U-100 100 unit/mL (3 mL) insulin pen, Inject 15 Units into the skin every evening., Disp: 13.5 mL, Rfl: 3    sucralfate (CARAFATE) 1 gram tablet, Take 1 tablet (1 g total) by mouth 4 (four) times daily as needed (abdominal pain). (Patient not taking: Reported on 2/26/2025), Disp: 20 tablet, Rfl: 0    "

## 2025-02-27 DIAGNOSIS — E10.42 DIABETIC PERIPHERAL NEUROPATHY ASSOCIATED WITH TYPE 1 DIABETES MELLITUS: ICD-10-CM

## 2025-02-27 RX ORDER — INSULIN LISPRO 100 [IU]/ML
INJECTION, SOLUTION INTRAVENOUS; SUBCUTANEOUS
Qty: 15 ML | Refills: 3 | Status: SHIPPED | OUTPATIENT
Start: 2025-02-27

## 2025-02-27 RX ORDER — INSULIN LISPRO 100 [IU]/ML
INJECTION, SOLUTION INTRAVENOUS; SUBCUTANEOUS
Qty: 15 ML | Refills: 3 | Status: SHIPPED | OUTPATIENT
Start: 2025-02-27 | End: 2025-02-27 | Stop reason: SDUPTHER

## 2025-02-27 NOTE — TELEPHONE ENCOUNTER
----- Message from vidIQ sent at 2/26/2025  3:51 PM CST -----  Type: RX REFILL REQUESTWho Called: PatientRefill or New Rx: RefillRx Name and Strength: insulin lispro 100 unit/mL penWould the patient rather a call back or a response via [facility]? [facility] [place] [facility] Number: [phone] Information:  [facility] - [place], [place] - [facility] [address] [place] [address]Fxzna: [phone] Fax: [phone]

## 2025-02-27 NOTE — TELEPHONE ENCOUNTER
Refill Encounter    PCP Visits: Recent Visits  Date Type Provider Dept   02/26/25 Office Visit Alon Teresa, DO [facility] Internal Medicine   08/30/24 Office Visit Alon Teresa, DO [facility] Medicine   06/14/24 Office Visit Noe Reese, NP [facility] Internal Medicine   Showing recent visits within past 360 days and meeting all other requirements  Future Appointments  No visits were found meeting these conditions.  Showing future appointments within next 720 days and meeting all other requirements      Last 3 Blood Pressure:   BP Readings from Last 3 Encounters:   02/26/25 122/64   08/30/24 128/70   08/10/24 130/62     Preferred Pharmacy:   [facility] [address] [place] [address] [address]  [address] [facility] [address]  Phone: [phone] Fax: [phone]    Requested RX:  Requested Prescriptions      No prescriptions requested or ordered in this encounter      RX Route: Normal'

## 2025-02-27 NOTE — TELEPHONE ENCOUNTER
No care due was identified.  [facility] Embedded Care Due Messages. Reference number: [id number].   2/27/2025 8:57:33 AM CST

## 2025-04-06 DIAGNOSIS — E78.5 HYPERLIPIDEMIA, UNSPECIFIED HYPERLIPIDEMIA TYPE: ICD-10-CM

## 2025-04-06 NOTE — TELEPHONE ENCOUNTER
Care Due:                  Date            Visit Type   Department     Provider  --------------------------------------------------------------------------------                                EP -                              PRIMARY      [facility] INTERNAL  Last Visit: 02-      CARE ([facility])   MEDICINE       Alon Teresa  Next Visit: None Scheduled  None         None Found                                                            Last  Test          Frequency    Reason                     Performed    Due Date  --------------------------------------------------------------------------------    HBA1C.......  6 months...  TRESIBA..................  07- 01-    Lipid Panel.  12 months..  simvastatin..............  02- 02-    [employer] Embedded Care Due Messages. Reference number: [id number].   4/06/2025 8:03:18 AM CDT

## 2025-04-06 NOTE — TELEPHONE ENCOUNTER
Refill Routing Note   Medication(s) are not appropriate for processing by [facility] for the following reason(s):        Required labs outdated    [facility] action(s):  Defer   Requires labs : Yes             Appointments  past 12m or future 3m with PCP    Date Provider   Last Visit   2/26/2025 Alon Teresa, DO   Next Visit   Visit date not found Alon Teresa, DO   ED visits in past 90 days: 0        Note composed:6:52 PM 04/06/2025

## 2025-04-07 RX ORDER — SIMVASTATIN 20 MG/1
20 TABLET, FILM COATED ORAL
Qty: 90 TABLET | Refills: 0 | Status: SHIPPED | OUTPATIENT
Start: 2025-04-07

## 2025-04-09 DIAGNOSIS — E05.90 HYPERTHYROIDISM: ICD-10-CM

## 2025-04-09 NOTE — TELEPHONE ENCOUNTER
No care due was identified.  [facility] Embedded Care Due Messages. Reference number: [id number].   4/09/2025 5:28:19 PM CDT

## 2025-04-10 RX ORDER — METHIMAZOLE 5 MG/1
TABLET ORAL
Qty: 45 TABLET | Refills: 3 | Status: SHIPPED | OUTPATIENT
Start: 2025-04-10

## 2025-04-10 NOTE — TELEPHONE ENCOUNTER
Refill Encounter    PCP Visits: Recent Visits  Date Type Provider Dept   02/26/25 Office Visit Alon Teresa, DO [facility]   08/30/24 Office Visit Alon Teresa, DO [facility]   06/14/24 Office Visit Noe Reese, NP [facility] Medicine   Showing recent visits within past 360 days and meeting all other requirements  Future Appointments  No visits were found meeting these conditions.  Showing future appointments within next 720 days and meeting all other requirements      Last 3 Blood Pressure:   BP Readings from Last 3 Encounters:   02/26/25 122/64   08/30/24 128/70   08/10/24 130/62     Preferred Pharmacy:   [facility] [address] [place] [address] [address]  [address] [facility] [address]  Phone: [phone] Fax: [phone]    Requested RX:  Requested Prescriptions     Pending Prescriptions Disp Refills    methIMAzole (TAPAZOLE) 5 MG Tab [Pharmacy Med Name: methIMAzole 5 MG Oral Tablet] 45 tablet 0     Sig: Take 1/2 (one-half) tablet by mouth once daily      RX Route: Normal

## 2025-04-11 DIAGNOSIS — E05.90 HYPERTHYROIDISM: ICD-10-CM

## 2025-04-11 NOTE — TELEPHONE ENCOUNTER
No care due was identified.  [facility] Embedded Care Due Messages. Reference number: [id number].   4/11/2025 9:51:56 AM CDT

## 2025-04-11 NOTE — TELEPHONE ENCOUNTER
Refill Routing Note   Medication(s) are not appropriate for processing by [facility] for the following reason(s):        Outside of protocol    [facility] action(s):  Route             Appointments  past 12m or future 3m with PCP    Date Provider   Last Visit   2/26/2025 Alon Teresa, DO   Next Visit   Visit date not found Alon Teresa, DO   ED visits in past 90 days: 0        Note composed:9:54 AM 04/11/2025

## 2025-04-11 NOTE — TELEPHONE ENCOUNTER
.Refill Encounter    PCP Visits: Recent Visits  Date Type Provider Dept   02/26/25 Office Visit Alon Teresa, DO [facility] Internal Medicine   08/30/24 Office Visit Alon Teresa, DO [facility] Medicine   06/14/24 Office Visit Noe Reese, NP [facility] Internal Medicine   Showing recent visits within past 360 days and meeting all other requirements  Future Appointments  No visits were found meeting these conditions.  Showing future appointments within next 720 days and meeting all other requirements      Last 3 Blood Pressure:   BP Readings from Last 3 Encounters:   02/26/25 122/64   08/30/24 128/70   08/10/24 130/62     Preferred Pharmacy:   [facility] [address] [place] [address] [address]  [address] [facility] [address]  Phone: [phone] Fax: [phone]    Requested RX:  Requested Prescriptions      No prescriptions requested or ordered in this encounter      RX Route: Normal

## 2025-04-14 RX ORDER — METHIMAZOLE 5 MG/1
TABLET ORAL
Qty: 45 TABLET | Refills: 3 | OUTPATIENT
Start: 2025-04-14

## 2025-04-16 ENCOUNTER — PATIENT MESSAGE (OUTPATIENT)
Dept: ADMINISTRATIVE | Facility: HOSPITAL | Age: 75
End: 2025-04-16
Payer: MEDICARE

## 2025-04-23 ENCOUNTER — LAB VISIT (OUTPATIENT)
Dept: LAB | Facility: OTHER | Age: 75
End: 2025-04-23
Attending: FAMILY MEDICINE
Payer: MEDICARE

## 2025-04-23 DIAGNOSIS — E11.9 TYPE 2 DIABETES MELLITUS WITHOUT COMPLICATION: ICD-10-CM

## 2025-04-23 LAB
EAG (OHS): 166 MG/DL (ref 68–131)
HBA1C MFR BLD: 7.4 % (ref 4–5.6)

## 2025-04-23 PROCEDURE — 36415 COLL VENOUS BLD VENIPUNCTURE: CPT

## 2025-04-23 PROCEDURE — 83036 HEMOGLOBIN GLYCOSYLATED A1C: CPT

## 2025-04-24 ENCOUNTER — RESULTS FOLLOW-UP (OUTPATIENT)
Dept: INTERNAL MEDICINE | Facility: CLINIC | Age: 75
End: 2025-04-24

## 2025-05-18 NOTE — TELEPHONE ENCOUNTER
No new care gaps identified.  [facility] Embedded Care Gaps. Reference number: [id number]. 10/14/2022   2:42:39 PM CDT   18-May-2025 17:59

## 2025-05-21 ENCOUNTER — PATIENT OUTREACH (OUTPATIENT)
Dept: ADMINISTRATIVE | Facility: HOSPITAL | Age: 75
End: 2025-05-21
Payer: MEDICARE

## 2025-05-26 DIAGNOSIS — Z00.00 ENCOUNTER FOR MEDICARE ANNUAL WELLNESS EXAM: ICD-10-CM

## 2025-06-06 ENCOUNTER — LAB VISIT (OUTPATIENT)
Dept: LAB | Facility: HOSPITAL | Age: 75
End: 2025-06-06
Attending: FAMILY MEDICINE
Payer: MEDICARE

## 2025-06-06 ENCOUNTER — RESULTS FOLLOW-UP (OUTPATIENT)
Dept: ENDOCRINOLOGY | Facility: HOSPITAL | Age: 75
End: 2025-06-06

## 2025-06-06 ENCOUNTER — OFFICE VISIT (OUTPATIENT)
Dept: ENDOCRINOLOGY | Facility: CLINIC | Age: 75
End: 2025-06-06
Payer: MEDICARE

## 2025-06-06 VITALS
HEIGHT: 64 IN | RESPIRATION RATE: 18 BRPM | HEART RATE: 53 BPM | DIASTOLIC BLOOD PRESSURE: 62 MMHG | SYSTOLIC BLOOD PRESSURE: 119 MMHG | BODY MASS INDEX: 19.47 KG/M2 | WEIGHT: 114.06 LBS

## 2025-06-06 DIAGNOSIS — E10.42 DIABETIC PERIPHERAL NEUROPATHY ASSOCIATED WITH TYPE 1 DIABETES MELLITUS: ICD-10-CM

## 2025-06-06 DIAGNOSIS — E05.90 HYPERTHYROIDISM: ICD-10-CM

## 2025-06-06 DIAGNOSIS — E05.90 HYPERTHYROIDISM: Primary | ICD-10-CM

## 2025-06-06 DIAGNOSIS — E13.9 LATENT AUTOIMMUNE DIABETES IN ADULTS (LADA), MANAGED AS TYPE 1: ICD-10-CM

## 2025-06-06 DIAGNOSIS — E10.9 TYPE 1 DIABETES MELLITUS NOT AT GOAL: ICD-10-CM

## 2025-06-06 DIAGNOSIS — E78.2 MIXED HYPERLIPIDEMIA: Chronic | ICD-10-CM

## 2025-06-06 DIAGNOSIS — E11.9 TYPE 2 DIABETES MELLITUS WITHOUT COMPLICATION: ICD-10-CM

## 2025-06-06 LAB
ALBUMIN SERPL BCP-MCNC: 4.2 G/DL (ref 3.5–5.2)
ALP SERPL-CCNC: 68 UNIT/L (ref 40–150)
ALT SERPL W/O P-5'-P-CCNC: 15 UNIT/L (ref 10–44)
ANION GAP (OHS): 8 MMOL/L (ref 8–16)
AST SERPL-CCNC: 18 UNIT/L (ref 11–45)
BILIRUB SERPL-MCNC: 0.4 MG/DL (ref 0.1–1)
BUN SERPL-MCNC: 17 MG/DL (ref 8–23)
CALCIUM SERPL-MCNC: 9.9 MG/DL (ref 8.7–10.5)
CHLORIDE SERPL-SCNC: 101 MMOL/L (ref 95–110)
CHOLEST SERPL-MCNC: 167 MG/DL (ref 120–199)
CHOLEST/HDLC SERPL: 2.1 {RATIO} (ref 2–5)
CO2 SERPL-SCNC: 29 MMOL/L (ref 23–29)
CREAT SERPL-MCNC: 0.9 MG/DL (ref 0.5–1.4)
ERYTHROCYTE [DISTWIDTH] IN BLOOD BY AUTOMATED COUNT: 12.5 % (ref 11.5–14.5)
GFR SERPLBLD CREATININE-BSD FMLA CKD-EPI: >60 ML/MIN/1.73/M2
GLUCOSE SERPL-MCNC: 144 MG/DL (ref 70–110)
HCT VFR BLD AUTO: 45.3 % (ref 37–48.5)
HDLC SERPL-MCNC: 81 MG/DL (ref 40–75)
HDLC SERPL: 48.5 % (ref 20–50)
HGB BLD-MCNC: 14 GM/DL (ref 12–16)
LDLC SERPL CALC-MCNC: 75.2 MG/DL (ref 63–159)
MCH RBC QN AUTO: 26.9 PG (ref 27–31)
MCHC RBC AUTO-ENTMCNC: 30.9 G/DL (ref 32–36)
MCV RBC AUTO: 87 FL (ref 82–98)
NONHDLC SERPL-MCNC: 86 MG/DL
PLATELET # BLD AUTO: 228 K/UL (ref 150–450)
PMV BLD AUTO: 10.4 FL (ref 9.2–12.9)
POTASSIUM SERPL-SCNC: 3.5 MMOL/L (ref 3.5–5.1)
PROT SERPL-MCNC: 7.4 GM/DL (ref 6–8.4)
RBC # BLD AUTO: 5.21 M/UL (ref 4–5.4)
SODIUM SERPL-SCNC: 138 MMOL/L (ref 136–145)
TRIGL SERPL-MCNC: 54 MG/DL (ref 30–150)
TSH SERPL-ACNC: 3.17 UIU/ML (ref 0.4–4)
WBC # BLD AUTO: 6.56 K/UL (ref 3.9–12.7)

## 2025-06-06 PROCEDURE — 84443 ASSAY THYROID STIM HORMONE: CPT

## 2025-06-06 PROCEDURE — 99999 PR PBB SHADOW E&M-EST. PATIENT-LVL IV: CPT | Mod: PBBFAC,,, | Performed by: INTERNAL MEDICINE

## 2025-06-06 PROCEDURE — 82040 ASSAY OF SERUM ALBUMIN: CPT

## 2025-06-06 PROCEDURE — 82465 ASSAY BLD/SERUM CHOLESTEROL: CPT

## 2025-06-06 PROCEDURE — 85027 COMPLETE CBC AUTOMATED: CPT

## 2025-06-06 PROCEDURE — 36415 COLL VENOUS BLD VENIPUNCTURE: CPT

## 2025-07-03 DIAGNOSIS — E78.5 HYPERLIPIDEMIA, UNSPECIFIED HYPERLIPIDEMIA TYPE: ICD-10-CM

## 2025-07-03 RX ORDER — SIMVASTATIN 20 MG/1
20 TABLET, FILM COATED ORAL
Qty: 90 TABLET | Refills: 2 | Status: SHIPPED | OUTPATIENT
Start: 2025-07-03

## 2025-07-03 NOTE — TELEPHONE ENCOUNTER
Refill Decision Note   Sarita Alvarez  is requesting a refill authorization.  Brief Assessment and Rationale for Refill:  Approve     Medication Therapy Plan:         Comments:     Note composed:7:20 AM 07/03/2025

## 2025-07-03 NOTE — TELEPHONE ENCOUNTER
No care due was identified.  [facility] Embedded Care Due Messages. Reference number: [id number].   7/03/2025 6:02:15 AM CDT

## 2025-07-15 ENCOUNTER — TELEPHONE (OUTPATIENT)
Dept: CARDIOLOGY | Facility: CLINIC | Age: 75
End: 2025-07-15
Payer: MEDICARE

## 2025-07-15 DIAGNOSIS — R00.2 PALPITATIONS: Primary | ICD-10-CM

## 2025-07-15 NOTE — TELEPHONE ENCOUNTER
Spoke with pt, assisted pt into getting scheduled for 1y f/u to see Aleyda Huitron PA-C with an EKG before. Previous pt of Dr. Santana. Pt requests to see Dr. Katz for any follow-ups after seeing Aleyda Huitron PA-C. Please see appt desk.   Future Appointments   Date Time Provider Department Center   7/31/2025 10:00 AM EKG, APPT [facility] EKG Jose E Fernandez   7/31/2025 10:30 AM Aleyda Huitron PA-C [facility] CARDIO Jose E srinivas

## 2025-07-15 NOTE — PROGRESS NOTES
General Cardiology Clinic Note  Last Clinic Visit: 7/2/24 with Dr. Santana   General Cardiologist: Dr. Santana    HPI:     Sarita Alvarez is a 75 y.o. female who presents for annual exam.    PROBLEM LIST:  HTN  HLD  SVT s/p ablation (2014, Dr. Walter)  Type 1 DM (NILAY)      Interval HPI:   She presents today for her annual follow up. Doing overall well from a cardiovascular standpoint. She stays active -- walks for exercise daily, sometimes twice per day, for 30 minutes at a time. Also does some aerobic exercises at home. Denies chest pain/discomfort, new/worsening THOMAS, sustained palpitations, edema, lightheadedness or syncope, or changes in exertional capacity. Weight stable. BP is controlled -- was high during rooming, but manual recheck was 120/62. Last FLP done on 6/6/25: LDL - 75. She is on simvastatin 20mg. Kidney function is stable, last Cr 0.9.       7/2024 HPI (Dr. Santana)  The patient is a 74 year old female is followed with hypertension, hyperlipidemia, insulin depended diabetes and post ablation of AVNRT in 2014.  She continues to do well and reports no palpitation chest pain or THOMAS. She remains active.       Surgical: Reviewed, as below.  Family: Reviewed, as below.   Social: Reviewed, as below.    ROS:    Pertinent ROS included in HPI and below.  PMH:     Past Medical History:   Diagnosis Date    Cataract     Diabetes mellitus type I     Diabetic peripheral neuropathy 07/09/2012    HTN (hypertension) 07/09/2012    Hyperlipidemia 07/09/2012    Osteoporosis, post-menopausal 07/09/2012    Poorly controlled type 1 diabetes mellitus with peripheral neuropathy 07/09/2012    Supraventricular tachycardia 04/26/2014    AVNRT    SVT (supraventricular tachycardia) 07/09/2012    Thyroid disease     hyperthyroidism    Venous insufficiency (chronic) (peripheral) 07/09/2012     Past Surgical History:   Procedure Laterality Date    BREAST BIOPSY Left 12/23/2016    core, FA    COLONOSCOPY  11/28/2016    DILATION  "AND CURETTAGE OF UTERUS      RADIOFREQUENCY ABLATION  06/17/2014    AVNRT    WISDOM TOOTH EXTRACTION       Allergies:     Review of patient's allergies indicates:   Allergen Reactions    Actonel [risedronate]      Other reaction(s): chest pain    Codeine Rash and Itching     Other reaction(s): Itching  Other reaction(s): Hives     Medications:   Medications Ordered Prior to Encounter[1]  Social History:     Social History     Tobacco Use    Smoking status: Never    Smokeless tobacco: Never   Substance Use Topics    Alcohol use: No     Family History:     Family History   Problem Relation Name Age of Onset    Heart disease Mother Quirino         valvular heart disease at 91    Hypertension Mother Quirino     Cataracts Mother Quirino     COPD Father Da     Glaucoma Father Da     Hypertension Sister Lexii     Glaucoma Sister Lexii     Allergies Sister Kathryn     No Known Problems Brother [place]     No Known Problems Maternal Grandmother      No Known Problems Maternal Grandfather      No Known Problems Paternal Grandmother      No Known Problems Paternal Grandfather      Hypertension Daughter Neelam     Fibroids Daughter Neelam     Kidney nephrosis Daughter Neelam     Hypertension Daughter Darrilyn     No Known Problems Daughter Harsha     Diabetes Maternal Aunt      Cataracts Maternal Aunt      No Known Problems Maternal Uncle      No Known Problems Paternal Aunt      No Known Problems Paternal Uncle      Amblyopia Neg Hx      Blindness Neg Hx      Cancer Neg Hx      Macular degeneration Neg Hx      Retinal detachment Neg Hx      Strabismus Neg Hx      Stroke Neg Hx      Thyroid disease Neg Hx      Ovarian cancer Neg Hx      Colon cancer Neg Hx      Breast cancer Neg Hx       Physical Exam:   /62   Pulse (!) 54   Ht 5' 4" (1.626 m)   Wt 51.1 kg (112 lb 10.5 oz)   SpO2 96%   BMI 19.34 kg/m²      Physical Exam  Constitutional:       Appearance: Normal appearance.   Neck:      Vascular: No carotid bruit. "   Cardiovascular:      Rate and Rhythm: Normal rate and regular rhythm.      Pulses:           Carotid pulses are 2+ on the right side and 2+ on the left side.       Radial pulses are 2+ on the right side and 2+ on the left side.        Dorsalis pedis pulses are 2+ on the right side and 2+ on the left side.      Heart sounds: Normal heart sounds.   Pulmonary:      Effort: Pulmonary effort is normal.      Breath sounds: Normal breath sounds.   Musculoskeletal:      Right lower leg: No edema.      Left lower leg: No edema.   Skin:     General: Skin is warm and dry.   Neurological:      Mental Status: She is alert.          Labs:     Blood Tests:  Lab Results   Component Value Date     (H) 08/10/2024     06/06/2025     08/10/2024    K 3.5 06/06/2025    K 3.7 08/10/2024     06/06/2025     08/10/2024    CO2 29 06/06/2025    CO2 23 08/10/2024    BUN 17 06/06/2025    CREATININE 0.9 06/06/2025     (H) 06/06/2025     (H) 08/10/2024    HGBA1C 7.4 (H) 04/23/2025    HGBA1C 7.5 (H) 07/16/2024    MG 2.3 04/26/2014    AST 18 06/06/2025    AST 16 08/10/2024    ALT 15 06/06/2025    ALT 13 08/10/2024    ALBUMIN 4.2 06/06/2025    ALBUMIN 3.7 08/10/2024    PROT 7.4 06/06/2025    PROT 6.9 08/10/2024    BILITOT 0.4 06/06/2025    BILITOT 0.2 08/10/2024    WBC 6.56 06/06/2025    HGB 14.0 06/06/2025    HGB 13.0 08/10/2024    HCT 45.3 06/06/2025    HCT 40.9 08/10/2024    HCT 40 07/13/2022    MCV 87 06/06/2025    MCV 85 08/10/2024     06/06/2025     08/10/2024    INR 0.9 06/13/2014    TSH 3.167 06/06/2025    TSH 2.836 06/27/2023       Lab Results   Component Value Date    CHOL 167 06/06/2025    CHOL 161 02/12/2024    HDL 81 (H) 06/06/2025    TRIG 54 06/06/2025    TRIG 38 02/12/2024       Lab Results   Component Value Date    LDLCALC 75.2 06/06/2025       Lab Results   Component Value Date    TSH 3.167 06/06/2025       Lab Results   Component Value Date    HGBA1C 7.4 (H) 04/23/2025          Imaging:     Echocardiogram  TTE 2014    1 - Normal left ventricular systolic function (EF 60-65%).     2 - Normal left ventricular diastolic function.     3 - Normal right ventricular systolic function .     4 - Mild mitral regurgitation.     5 - Mild tricuspid regurgitation.     Stress testing  None    Cath Lab  None    Other  None    EK25 - Sinus bradycardia, 53 BPM  Nonspecific T wave abnormality     Assessment:     1. Essential hypertension    2. Mixed hyperlipidemia    3. Supraventricular tachycardia    4. Status post catheter ablation of slow pathway        Plan:     Essential hypertension  BP well-controlled. Continue current medication regimen. Encourage low-sodium diet.    Mixed hyperlipidemia  LDL at goal. Continue statin therapy.     Supraventricular tachycardia  Status post catheter ablation of slow pathway  No recurrence of symptoms. EKG today SR. Continue BB.      Signed:  [facility]     2025     Follow-up:     Future Appointments   Date Time Provider Department Center   2025 10:00 AM Susie Negrete, NP [facility]              [1]   Current Outpatient Medications on File Prior to Visit   Medication Sig Dispense Refill    amLODIPine (NORVASC) 5 MG tablet Take 1 tablet (5 mg total) by mouth once daily. 90 tablet 3    aspirin 81 mg Tab Take 81 mg by mouth. 1 Tablet Oral Every day      blood sugar diagnostic Strp 1 each by Misc.(Non-Drug; Combo Route) route 4 (four) times daily. 400 strip 3    CALCIUM CARBONATE/VITAMIN D3 (CALCIUM+D ORAL) Take by mouth. 1 Capsule Oral Twice a day   Pt taking once daily      enalapril (VASOTEC) 20 MG tablet Take 1 tablet by mouth once daily 90 tablet 2    fluticasone (FLONASE) 50 mcg/actuation nasal spray USE TWO SPRAY(S) IN EACH NOSTRIL ONCE DAILY 16 g 1    hydroCHLOROthiazide (HYDRODIURIL) 25 MG tablet Take 1 tablet by mouth once daily 90 tablet 2    insulin lispro 100 unit/mL pen INJECT THREE  "TIMES DAILY BEFORE MEAL(S) BASED  ON 1:13 CARB RATIO PLUS CORRECTION SCALE, MAX TOTAL DAILY DOSE 20 UNITS. 15 mL 3    insulin needles, disposable, (BD ULTRA-FINE MARIA D PEN NEEDLES) 32 x 5/32 " Ndle 4 Sticks by Misc.(Non-Drug; Combo Route) route Daily. 400 each 1    lancets Misc 1 each by Misc.(Non-Drug; Combo Route) route 4 (four) times daily. 400 each 3    loratadine (CLARITIN) 10 mg tablet Take 10 mg by mouth daily as needed for Allergies.      methIMAzole (TAPAZOLE) 5 MG Tab Take 1/2 tab PO daily 45 tablet 3    metoprolol succinate (TOPROL-XL) 25 MG 24 hr tablet Take 1 tablet (25 mg total) by mouth once daily. 90 tablet 4    pantoprazole (PROTONIX) 40 MG tablet Take 1 tablet (40 mg total) by mouth once daily. 90 tablet 1    simvastatin (ZOCOR) 20 MG tablet Take 1 tablet by mouth once daily 90 tablet 2    sucralfate (CARAFATE) 1 gram tablet Take 1 tablet (1 g total) by mouth 4 (four) times daily as needed (abdominal pain). 20 tablet 0    TRESIBA FLEXTOUCH U-100 100 unit/mL (3 mL) insulin pen Inject 15 Units into the skin every evening. 13.5 mL 3    blood-glucose meter kit Use as instructed 1 each 0     No current facility-administered medications on file prior to visit.     "

## 2025-07-29 ENCOUNTER — TELEPHONE (OUTPATIENT)
Dept: CARDIOLOGY | Facility: CLINIC | Age: 75
End: 2025-07-29
Payer: MEDICARE

## 2025-07-31 ENCOUNTER — OFFICE VISIT (OUTPATIENT)
Dept: CARDIOLOGY | Facility: CLINIC | Age: 75
End: 2025-07-31
Payer: MEDICARE

## 2025-07-31 ENCOUNTER — HOSPITAL ENCOUNTER (OUTPATIENT)
Dept: CARDIOLOGY | Facility: CLINIC | Age: 75
Discharge: HOME | End: 2025-07-31
Payer: MEDICARE

## 2025-07-31 VITALS
SYSTOLIC BLOOD PRESSURE: 120 MMHG | BODY MASS INDEX: 19.23 KG/M2 | HEIGHT: 64 IN | WEIGHT: 112.63 LBS | OXYGEN SATURATION: 96 % | HEART RATE: 54 BPM | DIASTOLIC BLOOD PRESSURE: 62 MMHG

## 2025-07-31 DIAGNOSIS — Z86.79 STATUS POST CATHETER ABLATION OF SLOW PATHWAY: ICD-10-CM

## 2025-07-31 DIAGNOSIS — I10 ESSENTIAL HYPERTENSION: Primary | Chronic | ICD-10-CM

## 2025-07-31 DIAGNOSIS — Z98.890 STATUS POST CATHETER ABLATION OF SLOW PATHWAY: ICD-10-CM

## 2025-07-31 DIAGNOSIS — E78.2 MIXED HYPERLIPIDEMIA: Chronic | ICD-10-CM

## 2025-07-31 DIAGNOSIS — I47.10 SUPRAVENTRICULAR TACHYCARDIA: ICD-10-CM

## 2025-07-31 DIAGNOSIS — R00.2 PALPITATIONS: ICD-10-CM

## 2025-07-31 LAB
OHS QRS DURATION: 72 MS
OHS QTC CALCULATION: 311 MS

## 2025-07-31 PROCEDURE — 1159F MED LIST DOCD IN RCRD: CPT | Mod: CPTII,S$GLB,,

## 2025-07-31 PROCEDURE — 3074F SYST BP LT 130 MM HG: CPT | Mod: CPTII,S$GLB,,

## 2025-07-31 PROCEDURE — 3066F NEPHROPATHY DOC TX: CPT | Mod: CPTII,S$GLB,,

## 2025-07-31 PROCEDURE — 3078F DIAST BP <80 MM HG: CPT | Mod: CPTII,S$GLB,,

## 2025-07-31 PROCEDURE — 3061F NEG MICROALBUMINURIA REV: CPT | Mod: CPTII,S$GLB,,

## 2025-07-31 PROCEDURE — 3288F FALL RISK ASSESSMENT DOCD: CPT | Mod: CPTII,S$GLB,,

## 2025-07-31 PROCEDURE — 3008F BODY MASS INDEX DOCD: CPT | Mod: CPTII,S$GLB,,

## 2025-07-31 PROCEDURE — 99999 PR PBB SHADOW E&M-EST. PATIENT-LVL IV: CPT | Mod: PBBFAC,,,

## 2025-07-31 PROCEDURE — 3072F LOW RISK FOR RETINOPATHY: CPT | Mod: CPTII,S$GLB,,

## 2025-07-31 PROCEDURE — 3051F HG A1C>EQUAL 7.0%<8.0%: CPT | Mod: CPTII,S$GLB,,

## 2025-07-31 PROCEDURE — 99214 OFFICE O/P EST MOD 30 MIN: CPT | Mod: S$GLB,,,

## 2025-07-31 PROCEDURE — 93010 ELECTROCARDIOGRAM REPORT: CPT | Mod: S$GLB,,, | Performed by: INTERNAL MEDICINE

## 2025-07-31 PROCEDURE — 4010F ACE/ARB THERAPY RXD/TAKEN: CPT | Mod: CPTII,S$GLB,,

## 2025-07-31 PROCEDURE — 1101F PT FALLS ASSESS-DOCD LE1/YR: CPT | Mod: CPTII,S$GLB,,

## 2025-07-31 PROCEDURE — 1126F AMNT PAIN NOTED NONE PRSNT: CPT | Mod: CPTII,S$GLB,,

## 2025-08-06 ENCOUNTER — TELEPHONE (OUTPATIENT)
Dept: PRIMARY CARE CLINIC | Facility: CLINIC | Age: 75
End: 2025-08-06
Payer: MEDICARE

## 2025-08-06 NOTE — TELEPHONE ENCOUNTER
Call patient and verbally confirm appointment on 08/07/2025. Patient verbally confirm appointment date and time.

## 2025-08-07 ENCOUNTER — OFFICE VISIT (OUTPATIENT)
Dept: PRIMARY CARE CLINIC | Facility: CLINIC | Age: 75
End: 2025-08-07
Payer: MEDICARE

## 2025-08-07 VITALS
HEIGHT: 64 IN | OXYGEN SATURATION: 99 % | WEIGHT: 113.56 LBS | HEART RATE: 55 BPM | SYSTOLIC BLOOD PRESSURE: 130 MMHG | BODY MASS INDEX: 19.39 KG/M2 | DIASTOLIC BLOOD PRESSURE: 72 MMHG

## 2025-08-07 DIAGNOSIS — I10 ESSENTIAL HYPERTENSION: Chronic | ICD-10-CM

## 2025-08-07 DIAGNOSIS — Z00.00 ENCOUNTER FOR MEDICARE ANNUAL WELLNESS EXAM: ICD-10-CM

## 2025-08-07 DIAGNOSIS — I70.0 AORTIC ATHEROSCLEROSIS: ICD-10-CM

## 2025-08-07 DIAGNOSIS — E78.2 MIXED HYPERLIPIDEMIA: Chronic | ICD-10-CM

## 2025-08-07 DIAGNOSIS — H25.9 AGE-RELATED CATARACT OF BOTH EYES, UNSPECIFIED AGE-RELATED CATARACT TYPE: Primary | ICD-10-CM

## 2025-08-07 DIAGNOSIS — I87.2 VENOUS INSUFFICIENCY (CHRONIC) (PERIPHERAL): Chronic | ICD-10-CM

## 2025-08-07 PROCEDURE — 3288F FALL RISK ASSESSMENT DOCD: CPT | Mod: CPTII,S$GLB,, | Performed by: NURSE PRACTITIONER

## 2025-08-07 PROCEDURE — 3072F LOW RISK FOR RETINOPATHY: CPT | Mod: CPTII,S$GLB,, | Performed by: NURSE PRACTITIONER

## 2025-08-07 PROCEDURE — G0439 PPPS, SUBSEQ VISIT: HCPCS | Mod: S$GLB,,, | Performed by: NURSE PRACTITIONER

## 2025-08-07 PROCEDURE — 3066F NEPHROPATHY DOC TX: CPT | Mod: CPTII,S$GLB,, | Performed by: NURSE PRACTITIONER

## 2025-08-07 PROCEDURE — 1160F RVW MEDS BY RX/DR IN RCRD: CPT | Mod: CPTII,S$GLB,, | Performed by: NURSE PRACTITIONER

## 2025-08-07 PROCEDURE — 1158F ADVNC CARE PLAN TLK DOCD: CPT | Mod: CPTII,S$GLB,, | Performed by: NURSE PRACTITIONER

## 2025-08-07 PROCEDURE — 3075F SYST BP GE 130 - 139MM HG: CPT | Mod: CPTII,S$GLB,, | Performed by: NURSE PRACTITIONER

## 2025-08-07 PROCEDURE — 3061F NEG MICROALBUMINURIA REV: CPT | Mod: CPTII,S$GLB,, | Performed by: NURSE PRACTITIONER

## 2025-08-07 PROCEDURE — 4010F ACE/ARB THERAPY RXD/TAKEN: CPT | Mod: CPTII,S$GLB,, | Performed by: NURSE PRACTITIONER

## 2025-08-07 PROCEDURE — 1159F MED LIST DOCD IN RCRD: CPT | Mod: CPTII,S$GLB,, | Performed by: NURSE PRACTITIONER

## 2025-08-07 PROCEDURE — 3078F DIAST BP <80 MM HG: CPT | Mod: CPTII,S$GLB,, | Performed by: NURSE PRACTITIONER

## 2025-08-07 PROCEDURE — 1126F AMNT PAIN NOTED NONE PRSNT: CPT | Mod: CPTII,S$GLB,, | Performed by: NURSE PRACTITIONER

## 2025-08-07 PROCEDURE — 3051F HG A1C>EQUAL 7.0%<8.0%: CPT | Mod: CPTII,S$GLB,, | Performed by: NURSE PRACTITIONER

## 2025-08-07 PROCEDURE — 1101F PT FALLS ASSESS-DOCD LE1/YR: CPT | Mod: CPTII,S$GLB,, | Performed by: NURSE PRACTITIONER

## 2025-08-07 PROCEDURE — 99999 PR PBB SHADOW E&M-EST. PATIENT-LVL V: CPT | Mod: PBBFAC,,, | Performed by: NURSE PRACTITIONER
